# Patient Record
Sex: FEMALE | Race: WHITE | NOT HISPANIC OR LATINO | Employment: OTHER | URBAN - NONMETROPOLITAN AREA
[De-identification: names, ages, dates, MRNs, and addresses within clinical notes are randomized per-mention and may not be internally consistent; named-entity substitution may affect disease eponyms.]

---

## 2017-04-09 ENCOUNTER — HOSPITAL ENCOUNTER (OUTPATIENT)
Facility: HOSPITAL | Age: 67
Setting detail: OBSERVATION
Discharge: HOME | End: 2017-04-10
Attending: EMERGENCY MEDICINE | Admitting: FAMILY MEDICINE

## 2017-04-09 ENCOUNTER — APPOINTMENT (OUTPATIENT)
Dept: GENERAL RADIOLOGY | Facility: HOSPITAL | Age: 67
End: 2017-04-09

## 2017-04-09 DIAGNOSIS — R06.09 DYSPNEA ON EXERTION: Primary | ICD-10-CM

## 2017-04-09 LAB
ALBUMIN SERPL-MCNC: 4.4 G/DL (ref 3.4–4.8)
ALBUMIN/GLOB SERPL: 1.3 G/DL (ref 1.1–1.8)
ALP SERPL-CCNC: 96 U/L (ref 38–126)
ALT SERPL W P-5'-P-CCNC: <6 U/L (ref 9–52)
ANION GAP SERPL CALCULATED.3IONS-SCNC: 15 MMOL/L (ref 5–15)
ARTERIAL PATENCY WRIST A: ABNORMAL
AST SERPL-CCNC: 16 U/L (ref 14–36)
ATMOSPHERIC PRESS: ABNORMAL MMHG
BASE EXCESS BLDA CALC-SCNC: 1.9 MMOL/L (ref -2.4–2.4)
BASOPHILS # BLD AUTO: 0.03 10*3/MM3 (ref 0–0.2)
BASOPHILS NFR BLD AUTO: 0.4 % (ref 0–2)
BDY SITE: ABNORMAL
BILIRUB SERPL-MCNC: 0.7 MG/DL (ref 0.2–1.3)
BUN BLD-MCNC: 15 MG/DL (ref 7–21)
BUN/CREAT SERPL: 15 (ref 7–25)
CA-I BLD-MCNC: 4.7 MG/DL (ref 4.5–4.9)
CALCIUM SPEC-SCNC: 8.5 MG/DL (ref 8.4–10.2)
CHLORIDE SERPL-SCNC: 100 MMOL/L (ref 95–110)
CK MB SERPL-CCNC: 0.24 NG/ML (ref 0–5)
CK MB SERPL-CCNC: 0.37 NG/ML (ref 0–5)
CK SERPL-CCNC: 46 U/L (ref 30–135)
CK SERPL-CCNC: 51 U/L (ref 30–135)
CO2 BLDA-SCNC: 28.8 MMOL/L (ref 23–27)
CO2 SERPL-SCNC: 27 MMOL/L (ref 22–31)
CREAT BLD-MCNC: 1 MG/DL (ref 0.5–1)
D-DIMER, QUANTITATIVE (MAD,POW, STR): <270 NG/ML (FEU) (ref 0–470)
DEPRECATED RDW RBC AUTO: 48.4 FL (ref 36.4–46.3)
EOSINOPHIL # BLD AUTO: 0.27 10*3/MM3 (ref 0–0.7)
EOSINOPHIL NFR BLD AUTO: 3.6 % (ref 0–7)
ERYTHROCYTE [DISTWIDTH] IN BLOOD BY AUTOMATED COUNT: 15.8 % (ref 11.5–14.5)
GFR SERPL CREATININE-BSD FRML MDRD: 55 ML/MIN/1.73 (ref 60–104)
GLOBULIN UR ELPH-MCNC: 3.3 GM/DL (ref 2.3–3.5)
GLUCOSE BLD-MCNC: 134 MG/DL (ref 60–100)
GLUCOSE BLDA-MCNC: 119 MMOL/L
HBA1C MFR BLD: 5.77 % (ref 4–5.6)
HCO3 BLDA-SCNC: 27.3 MMOL/L (ref 22–26)
HCT VFR BLD AUTO: 38.4 % (ref 35–45)
HCT VFR BLD CALC: 36 % (ref 38–47)
HGB BLD-MCNC: 12.4 G/DL (ref 12–15.5)
HGB BLDA-MCNC: 12.1 G/DL (ref 12–16)
HOLD SPECIMEN: NORMAL
HOLD SPECIMEN: NORMAL
IMM GRANULOCYTES # BLD: 0.02 10*3/MM3 (ref 0–0.02)
IMM GRANULOCYTES NFR BLD: 0.3 % (ref 0–0.5)
LYMPHOCYTES # BLD AUTO: 2.14 10*3/MM3 (ref 0.6–4.2)
LYMPHOCYTES NFR BLD AUTO: 28.6 % (ref 10–50)
MCH RBC QN AUTO: 27.1 PG (ref 26.5–34)
MCHC RBC AUTO-ENTMCNC: 32.3 G/DL (ref 31.4–36)
MCV RBC AUTO: 84 FL (ref 80–98)
MODALITY: ABNORMAL
MONOCYTES # BLD AUTO: 0.39 10*3/MM3 (ref 0–0.9)
MONOCYTES NFR BLD AUTO: 5.2 % (ref 0–12)
NEUTROPHILS # BLD AUTO: 4.62 10*3/MM3 (ref 2–8.6)
NEUTROPHILS NFR BLD AUTO: 61.9 % (ref 37–80)
PCO2 BLDA: 46.2 MM HG (ref 35–45)
PH BLDA: 7.39 PH UNITS (ref 7.35–7.45)
PLATELET # BLD AUTO: 374 10*3/MM3 (ref 150–450)
PMV BLD AUTO: 9.9 FL (ref 8–12)
PO2 BLDA: 94.4 MM HG (ref 80–105)
POTASSIUM BLD-SCNC: 4.1 MMOL/L (ref 3.5–5.1)
POTASSIUM BLDA-SCNC: 4.09 MMOL/L (ref 3.6–4.9)
PROT SERPL-MCNC: 7.7 G/DL (ref 6.3–8.6)
RBC # BLD AUTO: 4.57 10*6/MM3 (ref 3.77–5.16)
SAO2 % BLDCOA: 97.2 % (ref 94–100)
SODIUM BLD-SCNC: 142 MMOL/L (ref 137–145)
SODIUM BLDA-SCNC: 138.7 MMOL/L (ref 138–146)
TROPONIN I SERPL-MCNC: <0.012 NG/ML
TSH SERPL DL<=0.05 MIU/L-ACNC: 1.44 MIU/ML (ref 0.46–4.68)
WBC NRBC COR # BLD: 7.47 10*3/MM3 (ref 3.2–9.8)
WHOLE BLOOD HOLD SPECIMEN: NORMAL
WHOLE BLOOD HOLD SPECIMEN: NORMAL

## 2017-04-09 PROCEDURE — 87040 BLOOD CULTURE FOR BACTERIA: CPT | Performed by: FAMILY MEDICINE

## 2017-04-09 PROCEDURE — 71020 HC CHEST PA AND LATERAL: CPT

## 2017-04-09 PROCEDURE — G0378 HOSPITAL OBSERVATION PER HR: HCPCS

## 2017-04-09 PROCEDURE — 80053 COMPREHEN METABOLIC PANEL: CPT | Performed by: EMERGENCY MEDICINE

## 2017-04-09 PROCEDURE — 83036 HEMOGLOBIN GLYCOSYLATED A1C: CPT | Performed by: FAMILY MEDICINE

## 2017-04-09 PROCEDURE — 82803 BLOOD GASES ANY COMBINATION: CPT | Performed by: EMERGENCY MEDICINE

## 2017-04-09 PROCEDURE — 84443 ASSAY THYROID STIM HORMONE: CPT | Performed by: FAMILY MEDICINE

## 2017-04-09 PROCEDURE — 25010000002 AZITHROMYCIN: Performed by: FAMILY MEDICINE

## 2017-04-09 PROCEDURE — 25010000002 CEFTRIAXONE: Performed by: FAMILY MEDICINE

## 2017-04-09 PROCEDURE — 96361 HYDRATE IV INFUSION ADD-ON: CPT

## 2017-04-09 PROCEDURE — 25010000002 METHYLPREDNISOLONE PER 125 MG: Performed by: FAMILY MEDICINE

## 2017-04-09 PROCEDURE — 93005 ELECTROCARDIOGRAM TRACING: CPT | Performed by: EMERGENCY MEDICINE

## 2017-04-09 PROCEDURE — 96374 THER/PROPH/DIAG INJ IV PUSH: CPT

## 2017-04-09 PROCEDURE — 93005 ELECTROCARDIOGRAM TRACING: CPT

## 2017-04-09 PROCEDURE — 99284 EMERGENCY DEPT VISIT MOD MDM: CPT

## 2017-04-09 PROCEDURE — 82553 CREATINE MB FRACTION: CPT | Performed by: EMERGENCY MEDICINE

## 2017-04-09 PROCEDURE — 94799 UNLISTED PULMONARY SVC/PX: CPT

## 2017-04-09 PROCEDURE — 84484 ASSAY OF TROPONIN QUANT: CPT | Performed by: FAMILY MEDICINE

## 2017-04-09 PROCEDURE — 85025 COMPLETE CBC W/AUTO DIFF WBC: CPT | Performed by: EMERGENCY MEDICINE

## 2017-04-09 PROCEDURE — 25010000002 ONDANSETRON PER 1 MG: Performed by: FAMILY MEDICINE

## 2017-04-09 PROCEDURE — 84484 ASSAY OF TROPONIN QUANT: CPT | Performed by: EMERGENCY MEDICINE

## 2017-04-09 PROCEDURE — 94640 AIRWAY INHALATION TREATMENT: CPT

## 2017-04-09 PROCEDURE — 85379 FIBRIN DEGRADATION QUANT: CPT | Performed by: EMERGENCY MEDICINE

## 2017-04-09 PROCEDURE — 82553 CREATINE MB FRACTION: CPT | Performed by: FAMILY MEDICINE

## 2017-04-09 PROCEDURE — 96375 TX/PRO/DX INJ NEW DRUG ADDON: CPT

## 2017-04-09 PROCEDURE — 82550 ASSAY OF CK (CPK): CPT | Performed by: EMERGENCY MEDICINE

## 2017-04-09 PROCEDURE — 93010 ELECTROCARDIOGRAM REPORT: CPT | Performed by: INTERNAL MEDICINE

## 2017-04-09 PROCEDURE — 82550 ASSAY OF CK (CPK): CPT | Performed by: FAMILY MEDICINE

## 2017-04-09 RX ORDER — SODIUM CHLORIDE 9 MG/ML
INJECTION, SOLUTION INTRAVENOUS
Status: COMPLETED
Start: 2017-04-09 | End: 2017-04-09

## 2017-04-09 RX ORDER — METAXALONE 800 MG/1
800 TABLET ORAL 4 TIMES DAILY PRN
Status: DISCONTINUED | OUTPATIENT
Start: 2017-04-09 | End: 2017-04-10 | Stop reason: HOSPADM

## 2017-04-09 RX ORDER — DULOXETIN HYDROCHLORIDE 60 MG/1
CAPSULE, DELAYED RELEASE ORAL
Refills: 5 | COMMUNITY
Start: 2017-03-30 | End: 2017-04-09 | Stop reason: ALTCHOICE

## 2017-04-09 RX ORDER — IPRATROPIUM BROMIDE AND ALBUTEROL SULFATE 2.5; .5 MG/3ML; MG/3ML
3 SOLUTION RESPIRATORY (INHALATION)
Status: DISCONTINUED | OUTPATIENT
Start: 2017-04-09 | End: 2017-04-10 | Stop reason: HOSPADM

## 2017-04-09 RX ORDER — PANTOPRAZOLE SODIUM 40 MG/1
40 TABLET, DELAYED RELEASE ORAL 2 TIMES DAILY
Status: DISCONTINUED | OUTPATIENT
Start: 2017-04-09 | End: 2017-04-10 | Stop reason: HOSPADM

## 2017-04-09 RX ORDER — IPRATROPIUM BROMIDE AND ALBUTEROL SULFATE 2.5; .5 MG/3ML; MG/3ML
3 SOLUTION RESPIRATORY (INHALATION) ONCE
Status: COMPLETED | OUTPATIENT
Start: 2017-04-09 | End: 2017-04-09

## 2017-04-09 RX ORDER — AMLODIPINE BESYLATE 10 MG/1
TABLET ORAL
Refills: 2 | COMMUNITY
Start: 2017-04-03 | End: 2018-04-07

## 2017-04-09 RX ORDER — GABAPENTIN 300 MG/1
300 CAPSULE ORAL EVERY 8 HOURS SCHEDULED
Status: DISCONTINUED | OUTPATIENT
Start: 2017-04-09 | End: 2017-04-10 | Stop reason: HOSPADM

## 2017-04-09 RX ORDER — GABAPENTIN 300 MG/1
CAPSULE ORAL
Refills: 1 | COMMUNITY
Start: 2017-03-24 | End: 2020-08-18 | Stop reason: HOSPADM

## 2017-04-09 RX ORDER — MAGNESIUM HYDROXIDE/ALUMINUM HYDROXICE/SIMETHICONE 120; 1200; 1200 MG/30ML; MG/30ML; MG/30ML
30 SUSPENSION ORAL ONCE
Status: COMPLETED | OUTPATIENT
Start: 2017-04-09 | End: 2017-04-09

## 2017-04-09 RX ORDER — ZOLPIDEM TARTRATE 10 MG/1
10 TABLET ORAL NIGHTLY PRN
Refills: 2 | COMMUNITY
Start: 2017-03-20

## 2017-04-09 RX ORDER — METHYLPREDNISOLONE SODIUM SUCCINATE 40 MG/ML
40 INJECTION, POWDER, LYOPHILIZED, FOR SOLUTION INTRAMUSCULAR; INTRAVENOUS EVERY 8 HOURS
Status: DISCONTINUED | OUTPATIENT
Start: 2017-04-10 | End: 2017-04-10

## 2017-04-09 RX ORDER — SUCRALFATE 1 G/1
1 TABLET ORAL 3 TIMES DAILY PRN
Status: DISCONTINUED | OUTPATIENT
Start: 2017-04-09 | End: 2017-04-10 | Stop reason: HOSPADM

## 2017-04-09 RX ORDER — ASPIRIN 325 MG
325 TABLET ORAL ONCE
Status: COMPLETED | OUTPATIENT
Start: 2017-04-09 | End: 2017-04-09

## 2017-04-09 RX ORDER — FUROSEMIDE 20 MG/1
20 TABLET ORAL 2 TIMES DAILY PRN
Status: DISCONTINUED | OUTPATIENT
Start: 2017-04-09 | End: 2017-04-10

## 2017-04-09 RX ORDER — AMLODIPINE BESYLATE 10 MG/1
10 TABLET ORAL
Status: DISCONTINUED | OUTPATIENT
Start: 2017-04-10 | End: 2017-04-10 | Stop reason: HOSPADM

## 2017-04-09 RX ORDER — HYDROCODONE BITARTRATE AND ACETAMINOPHEN 7.5; 325 MG/1; MG/1
1 TABLET ORAL EVERY 6 HOURS PRN
Status: DISCONTINUED | OUTPATIENT
Start: 2017-04-09 | End: 2017-04-10 | Stop reason: HOSPADM

## 2017-04-09 RX ORDER — ONDANSETRON 2 MG/ML
4 INJECTION INTRAMUSCULAR; INTRAVENOUS EVERY 6 HOURS PRN
Status: DISCONTINUED | OUTPATIENT
Start: 2017-04-09 | End: 2017-04-10 | Stop reason: HOSPADM

## 2017-04-09 RX ORDER — METHYLPREDNISOLONE SODIUM SUCCINATE 125 MG/2ML
125 INJECTION, POWDER, LYOPHILIZED, FOR SOLUTION INTRAMUSCULAR; INTRAVENOUS ONCE
Status: COMPLETED | OUTPATIENT
Start: 2017-04-09 | End: 2017-04-09

## 2017-04-09 RX ORDER — ZOLPIDEM TARTRATE 5 MG/1
10 TABLET ORAL NIGHTLY PRN
Status: DISCONTINUED | OUTPATIENT
Start: 2017-04-09 | End: 2017-04-10 | Stop reason: HOSPADM

## 2017-04-09 RX ORDER — SODIUM CHLORIDE 0.9 % (FLUSH) 0.9 %
1-10 SYRINGE (ML) INJECTION AS NEEDED
Status: DISCONTINUED | OUTPATIENT
Start: 2017-04-09 | End: 2017-04-10 | Stop reason: HOSPADM

## 2017-04-09 RX ORDER — ALBUTEROL SULFATE 2.5 MG/3ML
2.5 SOLUTION RESPIRATORY (INHALATION) EVERY 4 HOURS PRN
Status: DISCONTINUED | OUTPATIENT
Start: 2017-04-09 | End: 2017-04-10 | Stop reason: HOSPADM

## 2017-04-09 RX ADMIN — IPRATROPIUM BROMIDE AND ALBUTEROL SULFATE 3 ML: 2.5; .5 SOLUTION RESPIRATORY (INHALATION) at 20:29

## 2017-04-09 RX ADMIN — HYDROCODONE BITARTRATE AND ACETAMINOPHEN 1 TABLET: 7.5; 325 TABLET ORAL at 20:46

## 2017-04-09 RX ADMIN — METHYLPREDNISOLONE SODIUM SUCCINATE 125 MG: 125 INJECTION, POWDER, FOR SOLUTION INTRAMUSCULAR; INTRAVENOUS at 21:46

## 2017-04-09 RX ADMIN — GABAPENTIN 300 MG: 300 CAPSULE ORAL at 21:43

## 2017-04-09 RX ADMIN — Medication 10 ML: at 20:49

## 2017-04-09 RX ADMIN — LIDOCAINE HYDROCHLORIDE 15 ML: 20 SOLUTION ORAL; TOPICAL at 15:08

## 2017-04-09 RX ADMIN — ALUMINUM HYDROXIDE, MAGNESIUM HYDROXIDE, AND SIMETHICONE 30 ML: 200; 200; 20 SUSPENSION ORAL at 15:09

## 2017-04-09 RX ADMIN — ONDANSETRON 4 MG: 2 INJECTION INTRAMUSCULAR; INTRAVENOUS at 21:49

## 2017-04-09 RX ADMIN — SODIUM CHLORIDE 500 ML: 9 INJECTION, SOLUTION INTRAVENOUS at 20:50

## 2017-04-09 RX ADMIN — IPRATROPIUM BROMIDE AND ALBUTEROL SULFATE 3 ML: .5; 3 SOLUTION RESPIRATORY (INHALATION) at 15:09

## 2017-04-09 RX ADMIN — ASPIRIN 325 MG: 325 TABLET, COATED ORAL at 15:08

## 2017-04-09 RX ADMIN — ZOLPIDEM TARTRATE 10 MG: 5 TABLET ORAL at 23:20

## 2017-04-09 RX ADMIN — CARBIDOPA AND LEVODOPA 1 TABLET: 25; 100 TABLET ORAL at 20:46

## 2017-04-09 RX ADMIN — PANTOPRAZOLE SODIUM 40 MG: 40 TABLET, DELAYED RELEASE ORAL at 20:46

## 2017-04-09 NOTE — H&P
"      [facility] Medicine Admission      Date of Admission: 4/9/2017      Primary Care Physician: Sandhya Thomas DO      Chief Complaint: Shortness of air    HPI:  This 66 year old female reported to the emergency department at the direction of the urgent care center she visited today.  Patient reports that for approximately the last week she has had mild shortness of air and wheezing.  Patient states that she was worried about pneumonia, so she reported to an urgent care center.  The urgent care center provider was concerned that she could be having an atypical sign of MI.  Patient then was directed to the emergency department.  Patient states that she doesn't really have any chest pain, though sometimes she may have a \"twang here or there.  \"Denies chest pain upon exertion.  Denies orthopnea or paroxysmal nocturnal dyspnea.  Patient is not currently having any fever or chills, but 3-4 days ago she said that she had a single temperature of 101.  Patient states that she does have some generalized weakness, but denies nausea and vomiting.  Denies headache, dizziness, syncope, presyncope, hematuria, hematochezia, hematemesis.    Concurrent Medical History:  has a past medical history of Acute gastric ulcer with hemorrhage; Acute sinusitis; Adenomatous polyp of colon; Chronic gastric ulcer without hemorrhage and without perforation; Constipation; Constipation; Depressive disorder; Depressive disorder; Fever; Generalized abdominal pain; GERD (gastroesophageal reflux disease); Gout; Hypertension; Hypertensive disorder; Influenza; Insomnia; Intertrigo; Nausea; Osteoarthritis of multiple joints; and Upper respiratory infection.    Past Surgical History:  has a past surgical history that includes Cholecystectomy; Esophagoscopy / EGD (06/02/2015); Knee surgery (10/02/2014); and Hysterectomy (01/01/1976).    Family History: family history includes Lung cancer in her other.    Social " History:  reports that she has quit smoking. She does not have any smokeless tobacco history on file. She reports that she does not drink alcohol.    Allergies:   Allergies   Allergen Reactions   • Benzoyl Peroxide    • Iodine       iodine:  UNRESPONSIVENESS   • Other      ARTIFICIAL SWEETNER:  Rash: TOPICAL ANTIBACERTIAL AGENTS:  Rash   • Latex Rash   • Tape Rash       Medications: Scheduled Meds:  Continuous Infusions:  No current facility-administered medications for this encounter.   PRN Meds:.    Review of Systems:  Review of Systems   Constitutional: Negative for chills and fever.   Respiratory: Positive for chest tightness, shortness of breath and wheezing.    Cardiovascular: Positive for chest pain. Negative for palpitations.        Mild, vague, multiple locations, reproducible chest pain   Gastrointestinal: Negative for abdominal pain, blood in stool, constipation, diarrhea, nausea and vomiting.   Genitourinary: Negative for decreased urine volume, dysuria, hematuria and urgency.   Musculoskeletal: Negative for myalgias.   Neurological: Positive for weakness. Negative for dizziness, seizures, syncope, facial asymmetry, speech difficulty, light-headedness, numbness and headaches.        Generalized weakness   Psychiatric/Behavioral: Negative for confusion.      Otherwise complete ROS is negative except as mentioned above.    Physical Exam:   Temp:  [98.3 °F (36.8 °C)-98.8 °F (37.1 °C)] 98.3 °F (36.8 °C)  Heart Rate:  [] 97  Resp:  [18-20] 18  BP: (121-178)/(60-96) 178/80  Physical Exam   Constitutional: She is oriented to person, place, and time. She appears well-developed and well-nourished.   HENT:   Head: Normocephalic and atraumatic.   Eyes: Conjunctivae and EOM are normal. Pupils are equal, round, and reactive to light.   Cardiovascular: Normal rate, regular rhythm, normal heart sounds and intact distal pulses.  Exam reveals no gallop and no friction rub.    No murmur heard.  Pulmonary/Chest:  "Effort normal. No respiratory distress. She has no wheezes. She has no rales.   Decreased at bases   Abdominal: Soft. Bowel sounds are normal. She exhibits no distension. There is no tenderness.   Musculoskeletal: She exhibits no edema or tenderness.   Neurological: She is alert and oriented to person, place, and time.   Skin: Skin is warm and dry.   Psychiatric: She has a normal mood and affect. Her behavior is normal.     Patient is speaking in long sentences, no distress, no accessory muscle use, no hypoxia, not on oxygen.  Patient stated, \"Honey, if I can't talk up a storm, just put me in my grave.\"      Results Reviewed:  I have personally reviewed current lab, radiology, and data and agree with results.  Lab Results (last 24 hours)     Procedure Component Value Units Date/Time    CBC & Differential [[id number]] Collected:  04/09/17 1314    Specimen:  Blood Updated:  04/09/17 1338    Narrative:       The following orders were created for panel order CBC & Differential.  Procedure                               Abnormality         Status                     ---------                               -----------         ------                     CBC Auto Differential[[id number]]         Abnormal            Final result                 Please view results for these tests on the individual orders.    CBC Auto Differential [[id number]]  (Abnormal) Collected:  04/09/17 1314    Specimen:  Blood Updated:  04/09/17 1338     WBC 7.47 10*3/mm3      RBC 4.57 10*6/mm3      Hemoglobin 12.4 g/dL      Hematocrit 38.4 %      MCV 84.0 fL      MCH 27.1 pg      MCHC 32.3 g/dL      RDW 15.8 (H) %      RDW-SD 48.4 (H) fl      MPV 9.9 fL      Platelets 374 10*3/mm3      Neutrophil % 61.9 %      Lymphocyte % 28.6 %      Monocyte % 5.2 %      Eosinophil % 3.6 %      Basophil % 0.4 %      Immature Grans % 0.3 %      Neutrophils, Absolute 4.62 10*3/mm3      Lymphocytes, Absolute 2.14 10*3/mm3      Monocytes, Absolute 0.39 10*3/mm3      " Eosinophils, Absolute 0.27 10*3/mm3      Basophils, Absolute 0.03 10*3/mm3      Immature Grans, Absolute 0.02 10*3/mm3     CK [[id number]]  (Normal) Collected:  04/09/17 1314    Specimen:  Blood Updated:  04/09/17 1346     Creatine Kinase 46 U/L     Comprehensive Metabolic Panel [[id number]]  (Abnormal) Collected:  04/09/17 1314    Specimen:  Blood Updated:  04/09/17 1351     Glucose 134 (H) mg/dL      BUN 15 mg/dL      Creatinine 1.00 mg/dL      Sodium 142 mmol/L      Potassium 4.1 mmol/L      Chloride 100 mmol/L      CO2 27.0 mmol/L      Calcium 8.5 mg/dL      Total Protein 7.7 g/dL      Albumin 4.40 g/dL      ALT (SGPT) <6 (L) U/L      AST (SGOT) 16 U/L      Alkaline Phosphatase 96 U/L      Total Bilirubin 0.7 mg/dL      eGFR Non African Amer 55 (L) mL/min/1.73      Globulin 3.3 gm/dL      A/G Ratio 1.3 g/dL      BUN/Creatinine Ratio 15.0     Anion Gap 15.0 mmol/L     CK-MB [[id number]]  (Normal) Collected:  04/09/17 1314    Specimen:  Blood Updated:  04/09/17 1355     CKMB 0.37 ng/mL     Blood Gas, Arterial [[id number]]  (Abnormal) Collected:  04/09/17 1501    Specimen:  Arterial Blood Updated:  04/09/17 1506     Site --      Not performed at this site.        Mario's Test --      Not performed at this site.        pH, Arterial 7.390 pH units      pCO2, Arterial 46.2 (H) mm Hg      pO2, Arterial 94.4 mm Hg      HCO3, Arterial 27.3 (H) mmol/L      Base Excess, Arterial 1.9 mmol/L      O2 Saturation, Arterial 97.2 %      Hemoglobin, Blood Gas 12.1 g/dL      Hematocrit, Blood Gas 36.0 %      CO2 Content 28.8 (H)     Sodium, Arterial 138.7 mmol/L      Potassium, Arterial 4.09 mmol/L      Glucose, Arterial 119 mmol/L      Barometric Pressure for Blood Gas -- mmHg       Not performed at this site.        Modality --      Not performed at this site.        Ionized Calcium 4.7 mg/dL     D-dimer, Quantitative [[id number]]  (Normal) Collected:  04/09/17 1551    Specimen:  Blood from Arm, Right Updated:  04/09/17 1617     D-Dimer,  Quantitative <270 ng/mL (FEU)     Narrative:       Dimer values <500 ng/ml FEU are FDA approved as aid in diagnosis of deep venous thrombosis and pulmonary embolism.  This test should not be used in an exclusion strategy with pretest probability alone.    A recent guideline regarding diagnosis for pulmonary thomboembolism recommends an adjusted exclusion criterion of age x 10 ng/ml FEU for patients >50 years of age ([employer] Intern Med 2015; 163: 701-711).    Troponin [[id number]]  (Normal) Collected:  04/09/17 1551    Specimen:  Blood from Arm, Right Updated:  04/09/17 1619     Troponin I <0.012 ng/mL     Green Top (Gel) [[id number]] Collected:  04/09/17 1314    Specimen:  Blood Updated:  04/09/17 1801     Extra Tube Hold for add-ons.      Auto resulted.       Lavender Top [[id number]] Collected:  04/09/17 1314    Specimen:  Blood Updated:  04/09/17 1801     Extra Tube hold for add-on      Auto resulted       [place] Draw [[id number]] Collected:  04/09/17 1314    Specimen:  Blood Updated:  04/09/17 1801    Narrative:       The following orders were created for panel order [place] Draw.  Procedure                               Abnormality         Status                     ---------                               -----------         ------                     Light Blue Top[[id number]]                                    Final result               Green Top (Gel)[[id number]]                                   Final result               Lavender Top[[id number]]                                      Final result               Gold Top - SST[[id number]]                                    Final result                 Please view results for these tests on the individual orders.    Light Blue Top [[id number]] Collected:  04/09/17 1314    Specimen:  Blood Updated:  04/09/17 1801     Extra Tube hold for add-on      Auto resulted       Gold Top - SST [[id number]] Collected:  04/09/17 1314    Specimen:  Blood Updated:  04/09/17 1801     Extra Tube Hold  for add-ons.      Auto resulted.           Imaging Results (last 24 hours)     Procedure Component Value Units Date/Time    XR Chest 2 View [[id number]] Collected:  04/09/17 1439     Updated:  04/09/17 1441    Narrative:       Patient Name:  YE ZAMUDIO  Patient ID:  [id number]   Ordering:  ZAY CRAWFORD  Attending:  ZAY CRAWFORD  Referring:  ZAY CRAWFORD  ------------------------------------------------    TWO VIEW CHEST    HISTORY: Shortness of breath    Frontal and lateral films of the chest were obtained.  COMPARISON: October 11, 2014    EKG leads in place.  Linear atelectasis or scar inferior right upper lobe.  The heart is not enlarged.  The pulmonary vasculature is not increased.  No pleural effusion.  No pneumothorax.  No acute osseous abnormality.  Degenerative changes are present in the thoracic spine.      Impression:       CONCLUSION:  Linear atelectasis or scar inferior right upper lobe.    [id number]    Electronically signed by:  Manjit Rich MD  4/9/2017 2:40 PM CDT  Workstation: [email or web address]            Assessment:    Hospital Problem List     Dyspnea on exertion      Possible bronchitis, bacterial v. Viral  Morbid obesity        Plan:  Steroids, nebulizer treatments, empiric antibiotics, cardiac enzymes, echocardiogram, incentive spirometry, monitor EKG.  At this time, acute cardiac event is low on differential list.  At this time, we will forgo cardiology consult.  If patient develops persistent chest pain, if enzymes become positive, or if echocardiogram yields acute results, will consult with cardiology.  At this time patient has no hypoxia, no distress, and leukocytosis, EKG and cardiac enzymes WNL.      AILYN Head  04/09/17  6:15 PM

## 2017-04-09 NOTE — ED PROVIDER NOTES
Subjective   HPI Comments: 66 years old female with history of hypertension, GERD, restless leg syndrome presented to ER from urgent care with worsening shortness of breath for the last 2 weeks.  Shortness of breath gets worse with activity.  She can hardly walk 20 steps before she has to rest to catch her breath.  She is also feeling tightness/pressure in the chest with shortness of breath.  She denies any palpitations.  She denies any fever or chills.    Patient is a 66 y.o. female presenting with shortness of breath.   History provided by:  Patient  Shortness of Breath   Severity:  Moderate  Onset quality:  Gradual  Duration:  2 weeks  Timing:  Intermittent  Progression:  Worsening  Chronicity:  New  Context: activity    Relieved by:  Rest  Worsened by:  Activity and coughing  Associated symptoms: chest pain, cough and wheezing    Associated symptoms: no abdominal pain, no fever, no headaches, no neck pain, no rash, no sputum production and no vomiting    Chest pain:     Quality: pressure      Severity:  Mild    Onset quality:  Gradual    Timing:  Intermittent    Progression:  Waxing and waning    Chronicity:  New  Cough:     Cough characteristics:  Dry    Severity:  Moderate    Onset quality:  Gradual    Timing:  Intermittent    Progression:  Worsening  Wheezing:     Severity:  Moderate    Onset quality:  Gradual    Timing:  Constant    Progression:  Worsening    Chronicity:  New  Risk factors: obesity    Risk factors: no hx of PE/DVT        Review of Systems   Constitutional: Negative for activity change, chills and fever.   HENT: Negative for congestion, facial swelling, rhinorrhea and sinus pressure.    Eyes: Negative for photophobia and visual disturbance.   Respiratory: Positive for cough, shortness of breath and wheezing. Negative for sputum production and chest tightness.    Cardiovascular: Positive for chest pain.   Gastrointestinal: Negative for abdominal distention, abdominal pain, diarrhea, nausea and  vomiting.   Endocrine: Negative for polyuria.   Genitourinary: Negative for flank pain.   Musculoskeletal: Negative for back pain, joint swelling and neck pain.   Skin: Negative for rash.   Neurological: Negative for seizures, numbness and headaches.   Hematological: Negative for adenopathy.   Psychiatric/Behavioral: Negative for agitation.       Past Medical History:   Diagnosis Date   • Acute gastric ulcer with hemorrhage    • Acute sinusitis    • Adenomatous polyp of colon    • Chronic gastric ulcer without hemorrhage and without perforation    • Constipation    • Constipation    • Depressive disorder    • Depressive disorder    • Fever    • Generalized abdominal pain    • GERD (gastroesophageal reflux disease)    • Gout    • Hypertension    • Hypertensive disorder    • Influenza    • Insomnia    • Intertrigo    • Nausea    • Osteoarthritis of multiple joints    • Upper respiratory infection        Allergies   Allergen Reactions   • Benzoyl Peroxide    • Iodine       iodine:  UNRESPONSIVENESS   • Other      ARTIFICIAL SWEETNER:  Rash: TOPICAL ANTIBACERTIAL AGENTS:  Rash   • Latex Rash   • Tape Rash       Past Surgical History:   Procedure Laterality Date   • CHOLECYSTECTOMY     • ESOPHAGOSCOPY / EGD  06/02/2015    Normal esophagus. Normal duodenum. Ulcer in the antrum. Performed at [facility].   • HYSTERECTOMY  01/01/1976   • KNEE SURGERY  10/02/2014    Bilateral total knee replacement, LCS, standard femoral components, 2.5 tibial, 10 mm insert with patella with cement. Two Hemovac drains each knee.       Family History   Problem Relation Age of Onset   • Lung cancer Other        Social History     Social History   • Marital status:      Spouse name: N/A   • Number of children: N/A   • Years of education: N/A     Social History Main Topics   • Smoking status: Former Smoker   • Smokeless tobacco: None      Comment: 30 pack year history   • Alcohol use No   • Drug use: None   • Sexual  activity: Not Asked     Other Topics Concern   • None     Social History Narrative           Objective   Physical Exam   Constitutional: She is oriented to person, place, and time. She appears well-developed and well-nourished. No distress.   HENT:   Head: Normocephalic and atraumatic.   Eyes: Conjunctivae and EOM are normal. Pupils are equal, round, and reactive to light.   Neck: Normal range of motion. Neck supple.   Cardiovascular: Normal rate, regular rhythm and normal heart sounds.    Pulmonary/Chest: Effort normal. No respiratory distress. She has wheezes.   Abdominal: Soft. Bowel sounds are normal. She exhibits no distension. There is no tenderness.   Musculoskeletal: Normal range of motion. She exhibits no edema or deformity.   Neurological: She is alert and oriented to person, place, and time.   Skin: Skin is warm and dry. No rash noted.   Psychiatric: She has a normal mood and affect.   Nursing note and vitals reviewed.      ECG 12 Lead    Date/Time: 4/9/2017 12:35 PM  Performed by: ZAY CRAWFORD  Authorized by: ZAY CRAWFORD   Interpreted by physician  Rhythm: sinus rhythm  Rate: normal  QRS axis: normal  Conduction: incomplete RBBB  Clinical impression: abnormal ECG                 ED Course  ED Course   Comment By Time   She is given breathing treatments and aspirin.  EKG did not show any acute ischemic changes.  Has negative initial set of cardiac enzymes.  D-dimer are negative.  She walked to bath room and again got short of breath with some chest tightness. She would be admitted to hospitalist service for dyspnea on exertion as I believe it is angina equivalent. Zay Crawford MD 04/09 1730        Labs Reviewed   COMPREHENSIVE METABOLIC PANEL - Abnormal; Notable for the following:        Result Value    Glucose 134 (*)     ALT (SGPT) <6 (*)     eGFR Non  Amer 55 (*)     All other components within normal limits   CBC WITH AUTO DIFFERENTIAL - Abnormal; Notable for the following:     RDW 15.8 (*)      RDW-SD 48.4 (*)     All other components within normal limits   BLOOD GAS, ARTERIAL - Abnormal; Notable for the following:     pCO2, Arterial 46.2 (*)     HCO3, Arterial 27.3 (*)     CO2 Content 28.8 (*)     All other components within normal limits   CK - Normal   CK MB - Normal   TROPONIN (IN-HOUSE) - Normal   D-DIMER, QUANTITATIVE - Normal    Narrative:     Dimer values <500 ng/ml FEU are FDA approved as aid in diagnosis of deep venous thrombosis and pulmonary embolism.  This test should not be used in an exclusion strategy with pretest probability alone.    A recent guideline regarding diagnosis for pulmonary thomboembolism recommends an adjusted exclusion criterion of age x 10 ng/ml FEU for patients >50 years of age ([employer] [facility] 2015; 163: 701-711).   RAINBOW DRAW    Narrative:     The following orders were created for panel order [place] Draw.  Procedure                               Abnormality         Status                     ---------                               -----------         ------                     Light Blue Top[[id number]]                                    In process                 Green Top (Gel)[[id number]]                                   In process                 Lavender Top[[id number]]                                      In process                 Gold Top - SST[[id number]]                                    In process                   Please view results for these tests on the individual orders.   TROPONIN (IN-HOUSE)   TROPONIN (IN-HOUSE)   CBC AND DIFFERENTIAL    Narrative:     The following orders were created for panel order CBC & Differential.  Procedure                               Abnormality         Status                     ---------                               -----------         ------                     CBC Auto Differential[[id number]]         Abnormal            Final result                 Please view results for these tests on the individual orders.   LIGHT BLUE  TOP   GREEN TOP   LAVENDER TOP   GOLD TOP - SST       Xr Chest 2 View    Result Date: 4/9/2017  Narrative: Patient Name:  YE ZAMUDIO Patient ID:  [id number] Ordering:  ZAY CRAWFORD Attending:  ZAY CRAWFORD Referring:  ZAY CRAWFORD ------------------------------------------------ TWO VIEW CHEST HISTORY: Shortness of breath Frontal and lateral films of the chest were obtained. COMPARISON: October 11, 2014 EKG leads in place. Linear atelectasis or scar inferior right upper lobe. The heart is not enlarged. The pulmonary vasculature is not increased. No pleural effusion. No pneumothorax. No acute osseous abnormality. Degenerative changes are present in the thoracic spine.     Impression: CONCLUSION: Linear atelectasis or scar inferior right upper lobe. [id number] Electronically signed by:  Manjit Rich MD  4/9/2017 2:40 PM CDT Workstation: [email or web address]                [facility]    Final diagnoses:   Dyspnea on exertion            Zay Crawford MD  04/11/17 2009

## 2017-04-09 NOTE — ED NOTES
Pt reports to ED c/o soa from urgent care, has had soa for 3-4 days. Navinies cp, n/v/d      Dom Underwood, PATI  04/09/17 1287

## 2017-04-10 ENCOUNTER — APPOINTMENT (OUTPATIENT)
Dept: CARDIOLOGY | Facility: HOSPITAL | Age: 67
End: 2017-04-10
Attending: FAMILY MEDICINE

## 2017-04-10 VITALS
TEMPERATURE: 97.7 F | HEART RATE: 104 BPM | OXYGEN SATURATION: 92 % | WEIGHT: 262 LBS | DIASTOLIC BLOOD PRESSURE: 65 MMHG | RESPIRATION RATE: 18 BRPM | SYSTOLIC BLOOD PRESSURE: 146 MMHG | BODY MASS INDEX: 46.42 KG/M2 | HEIGHT: 63 IN

## 2017-04-10 PROBLEM — I51.89 DIASTOLIC DYSFUNCTION: Chronic | Status: ACTIVE | Noted: 2017-04-10

## 2017-04-10 PROBLEM — I51.7 LVH (LEFT VENTRICULAR HYPERTROPHY): Chronic | Status: ACTIVE | Noted: 2017-04-10

## 2017-04-10 LAB
ANION GAP SERPL CALCULATED.3IONS-SCNC: 13 MMOL/L (ref 5–15)
ARTICHOKE IGE QN: 133 MG/DL (ref 1–129)
BASOPHILS # BLD AUTO: 0.01 10*3/MM3 (ref 0–0.2)
BASOPHILS NFR BLD AUTO: 0.1 % (ref 0–2)
BH CV ECHO MEAS - % IVS THICK: 37.9 %
BH CV ECHO MEAS - ACS: 2.1 CM
BH CV ECHO MEAS - AO MAX PG (FULL): 4.1 MMHG
BH CV ECHO MEAS - AO MAX PG: 15.7 MMHG
BH CV ECHO MEAS - AO MEAN PG (FULL): 0.99 MMHG
BH CV ECHO MEAS - AO MEAN PG: 7 MMHG
BH CV ECHO MEAS - AO ROOT AREA: 8.4 CM^2
BH CV ECHO MEAS - AO ROOT DIAM: 3.3 CM
BH CV ECHO MEAS - AO V2 MAX: 198.1 CM/SEC
BH CV ECHO MEAS - AO V2 MEAN: 120.6 CM/SEC
BH CV ECHO MEAS - AO V2 VTI: 30.1 CM
BH CV ECHO MEAS - AVA(I,A): 3.2 CM^2
BH CV ECHO MEAS - AVA(I,D): 3.2 CM^2
BH CV ECHO MEAS - AVA(V,A): 3.1 CM^2
BH CV ECHO MEAS - AVA(V,D): 3.1 CM^2
BH CV ECHO MEAS - EDV(CUBED): 86.6 ML
BH CV ECHO MEAS - EDV(TEICH): 88.9 ML
BH CV ECHO MEAS - EF(CUBED): 74.3 %
BH CV ECHO MEAS - EF(TEICH): 66.4 %
BH CV ECHO MEAS - ESV(CUBED): 22.2 ML
BH CV ECHO MEAS - ESV(TEICH): 29.9 ML
BH CV ECHO MEAS - FS: 36.5 %
BH CV ECHO MEAS - IVS/LVPW: 1.2
BH CV ECHO MEAS - IVSD: 1.3 CM
BH CV ECHO MEAS - IVSS: 1.8 CM
BH CV ECHO MEAS - LA DIMENSION: 4.3 CM
BH CV ECHO MEAS - LA/AO: 1.3
BH CV ECHO MEAS - LV MASS(C)D: 203.5 GRAMS
BH CV ECHO MEAS - LV MAX PG: 11.6 MMHG
BH CV ECHO MEAS - LV MEAN PG: 6 MMHG
BH CV ECHO MEAS - LV V1 MAX: 169.6 CM/SEC
BH CV ECHO MEAS - LV V1 MEAN: 113.7 CM/SEC
BH CV ECHO MEAS - LV V1 VTI: 27.2 CM
BH CV ECHO MEAS - LVIDD: 4.4 CM
BH CV ECHO MEAS - LVIDS: 2.8 CM
BH CV ECHO MEAS - LVOT AREA (M): 3.8 CM^2
BH CV ECHO MEAS - LVOT AREA: 3.6 CM^2
BH CV ECHO MEAS - LVOT DIAM: 2.1 CM
BH CV ECHO MEAS - LVPWD: 1.2 CM
BH CV ECHO MEAS - MR MAX PG: 139.2 MMHG
BH CV ECHO MEAS - MR MAX VEL: 590 CM/SEC
BH CV ECHO MEAS - MV MAX PG: 12.6 MMHG
BH CV ECHO MEAS - MV MEAN PG: 5 MMHG
BH CV ECHO MEAS - MV V2 MAX: 176.3 CM/SEC
BH CV ECHO MEAS - MV V2 MEAN: 97.3 CM/SEC
BH CV ECHO MEAS - MV V2 VTI: 27.9 CM
BH CV ECHO MEAS - MVA(VTI): 3.5 CM^2
BH CV ECHO MEAS - PA MAX PG: 8.3 MMHG
BH CV ECHO MEAS - PA V2 MAX: 143.9 CM/SEC
BH CV ECHO MEAS - RAP SYSTOLE: 10 MMHG
BH CV ECHO MEAS - RVDD: 3.2 CM
BH CV ECHO MEAS - RVSP: 66 MMHG
BH CV ECHO MEAS - SV(AO): 253.5 ML
BH CV ECHO MEAS - SV(CUBED): 64.4 ML
BH CV ECHO MEAS - SV(LVOT): 97.8 ML
BH CV ECHO MEAS - SV(TEICH): 59 ML
BH CV ECHO MEAS - TAPSE (>1.6): 2.6 CM2
BH CV ECHO MEAS - TR MAX VEL: 289.7 CM/SEC
BILIRUB UR QL STRIP: NEGATIVE
BUN BLD-MCNC: 17 MG/DL (ref 7–21)
BUN/CREAT SERPL: 14.7 (ref 7–25)
CALCIUM SPEC-SCNC: 8.9 MG/DL (ref 8.4–10.2)
CHLORIDE SERPL-SCNC: 104 MMOL/L (ref 95–110)
CHOLEST SERPL-MCNC: 221 MG/DL (ref 0–199)
CLARITY UR: CLEAR
CO2 SERPL-SCNC: 23 MMOL/L (ref 22–31)
COLOR UR: YELLOW
CREAT BLD-MCNC: 1.16 MG/DL (ref 0.5–1)
DEPRECATED RDW RBC AUTO: 48.3 FL (ref 36.4–46.3)
EOSINOPHIL # BLD AUTO: 0 10*3/MM3 (ref 0–0.7)
EOSINOPHIL NFR BLD AUTO: 0 % (ref 0–7)
ERYTHROCYTE [DISTWIDTH] IN BLOOD BY AUTOMATED COUNT: 15.7 % (ref 11.5–14.5)
GFR SERPL CREATININE-BSD FRML MDRD: 47 ML/MIN/1.73 (ref 45–104)
GLUCOSE BLD-MCNC: 252 MG/DL (ref 60–100)
GLUCOSE UR STRIP-MCNC: NEGATIVE MG/DL
HCT VFR BLD AUTO: 35 % (ref 35–45)
HDLC SERPL-MCNC: 54 MG/DL (ref 60–200)
HGB BLD-MCNC: 11.3 G/DL (ref 12–15.5)
HGB UR QL STRIP.AUTO: NEGATIVE
IMM GRANULOCYTES # BLD: 0.02 10*3/MM3 (ref 0–0.02)
IMM GRANULOCYTES NFR BLD: 0.2 % (ref 0–0.5)
KETONES UR QL STRIP: NEGATIVE
LDLC/HDLC SERPL: 2.68 {RATIO} (ref 0–3.22)
LEUKOCYTE ESTERASE UR QL STRIP.AUTO: NEGATIVE
LYMPHOCYTES # BLD AUTO: 1.01 10*3/MM3 (ref 0.6–4.2)
LYMPHOCYTES NFR BLD AUTO: 11.6 % (ref 10–50)
MCH RBC QN AUTO: 27.2 PG (ref 26.5–34)
MCHC RBC AUTO-ENTMCNC: 32.3 G/DL (ref 31.4–36)
MCV RBC AUTO: 84.1 FL (ref 80–98)
MONOCYTES # BLD AUTO: 0.04 10*3/MM3 (ref 0–0.9)
MONOCYTES NFR BLD AUTO: 0.5 % (ref 0–12)
NEUTROPHILS # BLD AUTO: 7.59 10*3/MM3 (ref 2–8.6)
NEUTROPHILS NFR BLD AUTO: 87.6 % (ref 37–80)
NITRITE UR QL STRIP: NEGATIVE
PH UR STRIP.AUTO: <=5 [PH] (ref 5–9)
PLATELET # BLD AUTO: 392 10*3/MM3 (ref 150–450)
PMV BLD AUTO: 10.3 FL (ref 8–12)
POTASSIUM BLD-SCNC: 4.8 MMOL/L (ref 3.5–5.1)
PROT UR QL STRIP: NEGATIVE
RBC # BLD AUTO: 4.16 10*6/MM3 (ref 3.77–5.16)
SODIUM BLD-SCNC: 140 MMOL/L (ref 137–145)
SP GR UR STRIP: 1.02 (ref 1–1.03)
TRIGL SERPL-MCNC: 112 MG/DL (ref 20–199)
TROPONIN I SERPL-MCNC: <0.012 NG/ML
TROPONIN I SERPL-MCNC: <0.012 NG/ML
UROBILINOGEN UR QL STRIP: NORMAL
WBC NRBC COR # BLD: 8.67 10*3/MM3 (ref 3.2–9.8)
WHOLE BLOOD HOLD SPECIMEN: NORMAL

## 2017-04-10 PROCEDURE — 93306 TTE W/DOPPLER COMPLETE: CPT | Performed by: INTERNAL MEDICINE

## 2017-04-10 PROCEDURE — 94799 UNLISTED PULMONARY SVC/PX: CPT

## 2017-04-10 PROCEDURE — 80061 LIPID PANEL: CPT | Performed by: FAMILY MEDICINE

## 2017-04-10 PROCEDURE — 81003 URINALYSIS AUTO W/O SCOPE: CPT | Performed by: FAMILY MEDICINE

## 2017-04-10 PROCEDURE — 93306 TTE W/DOPPLER COMPLETE: CPT

## 2017-04-10 PROCEDURE — 84484 ASSAY OF TROPONIN QUANT: CPT | Performed by: FAMILY MEDICINE

## 2017-04-10 PROCEDURE — G0378 HOSPITAL OBSERVATION PER HR: HCPCS

## 2017-04-10 PROCEDURE — 25010000002 METHYLPREDNISOLONE PER 40 MG: Performed by: FAMILY MEDICINE

## 2017-04-10 PROCEDURE — 96376 TX/PRO/DX INJ SAME DRUG ADON: CPT

## 2017-04-10 PROCEDURE — 25010000002 ONDANSETRON PER 1 MG: Performed by: FAMILY MEDICINE

## 2017-04-10 PROCEDURE — 85025 COMPLETE CBC W/AUTO DIFF WBC: CPT | Performed by: FAMILY MEDICINE

## 2017-04-10 PROCEDURE — 87086 URINE CULTURE/COLONY COUNT: CPT | Performed by: FAMILY MEDICINE

## 2017-04-10 PROCEDURE — 93005 ELECTROCARDIOGRAM TRACING: CPT | Performed by: FAMILY MEDICINE

## 2017-04-10 PROCEDURE — 80048 BASIC METABOLIC PNL TOTAL CA: CPT | Performed by: FAMILY MEDICINE

## 2017-04-10 PROCEDURE — 93010 ELECTROCARDIOGRAM REPORT: CPT | Performed by: INTERNAL MEDICINE

## 2017-04-10 PROCEDURE — 94760 N-INVAS EAR/PLS OXIMETRY 1: CPT

## 2017-04-10 RX ORDER — ASPIRIN 81 MG/1
81 TABLET ORAL DAILY
Qty: 30 TABLET | Refills: 1 | Status: SHIPPED | OUTPATIENT
Start: 2017-04-10

## 2017-04-10 RX ORDER — FUROSEMIDE 20 MG/1
20 TABLET ORAL 2 TIMES DAILY
Status: DISCONTINUED | OUTPATIENT
Start: 2017-04-10 | End: 2017-04-10 | Stop reason: HOSPADM

## 2017-04-10 RX ORDER — ALBUTEROL SULFATE 90 UG/1
2 AEROSOL, METERED RESPIRATORY (INHALATION) EVERY 4 HOURS PRN
Qty: 1 INHALER | Refills: 1 | Status: SHIPPED | OUTPATIENT
Start: 2017-04-10

## 2017-04-10 RX ORDER — FUROSEMIDE 20 MG/1
20 TABLET ORAL 2 TIMES DAILY
Qty: 60 TABLET | Refills: 1 | Status: SHIPPED | OUTPATIENT
Start: 2017-04-10 | End: 2018-04-07

## 2017-04-10 RX ADMIN — IPRATROPIUM BROMIDE AND ALBUTEROL SULFATE 3 ML: 2.5; .5 SOLUTION RESPIRATORY (INHALATION) at 07:05

## 2017-04-10 RX ADMIN — HYDROCODONE BITARTRATE AND ACETAMINOPHEN 1 TABLET: 7.5; 325 TABLET ORAL at 03:10

## 2017-04-10 RX ADMIN — Medication 10 ML: at 08:41

## 2017-04-10 RX ADMIN — FUROSEMIDE 20 MG: 20 TABLET ORAL at 10:33

## 2017-04-10 RX ADMIN — METHYLPREDNISOLONE SODIUM SUCCINATE 40 MG: 40 INJECTION, POWDER, FOR SOLUTION INTRAMUSCULAR; INTRAVENOUS at 05:37

## 2017-04-10 RX ADMIN — CARBIDOPA AND LEVODOPA 1 TABLET: 25; 100 TABLET ORAL at 08:39

## 2017-04-10 RX ADMIN — ONDANSETRON 4 MG: 2 INJECTION INTRAMUSCULAR; INTRAVENOUS at 07:01

## 2017-04-10 RX ADMIN — HYDROCODONE BITARTRATE AND ACETAMINOPHEN 1 TABLET: 7.5; 325 TABLET ORAL at 12:25

## 2017-04-10 RX ADMIN — AMLODIPINE BESYLATE 10 MG: 10 TABLET ORAL at 08:39

## 2017-04-10 RX ADMIN — GABAPENTIN 300 MG: 300 CAPSULE ORAL at 05:35

## 2017-04-10 RX ADMIN — PANTOPRAZOLE SODIUM 40 MG: 40 TABLET, DELAYED RELEASE ORAL at 08:40

## 2017-04-10 NOTE — PLAN OF CARE
Problem: Patient Care Overview (Adult)  Goal: Plan of Care Review  Outcome: Ongoing (interventions implemented as appropriate)  Goal: Adult Individualization and Mutuality  Outcome: Ongoing (interventions implemented as appropriate)  Goal: Discharge Needs Assessment  Outcome: Ongoing (interventions implemented as appropriate)    Problem: Pain, Acute (Adult)  Goal: Acceptable Pain Control/Comfort Level  Outcome: Ongoing (interventions implemented as appropriate)    Problem: Respiratory Insufficiency (Adult)  Goal: Acid/Base Balance  Outcome: Ongoing (interventions implemented as appropriate)  Goal: Effective Ventilation  Outcome: Ongoing (interventions implemented as appropriate)

## 2017-04-10 NOTE — CONSULTS
Adult Nutrition  Assessment    Patient Name:  Lili Arguello  YOB: 1950  MRN: [id number]  Admit Date:  4/9/2017    Assessment Date:  4/10/2017          Reason for Assessment       04/10/17 0959    Reason for Assessment    Reason For Assessment/Visit per organizational policy   BMI >40                    Labs/Tests/Procedures/Meds       04/10/17 1000    Labs/Tests/Procedures/Meds    Labs/Tests Review Reviewed    Medication Review Reviewed, pertinent                Nutrition Prescription Ordered       04/10/17 1000    Nutrition Prescription PO    Common Modifiers Cardiac;Consistent Carbohydrate              Comments:  Pt on RD list r/t BMI > 40, which is compatible w/morbid obesity. Pt voiced interest in education and is also interested in outpt counseling after d/c. Pt concerned w/diabetic diet order and wants that d/c'd stating she does not have DM and is highly allergic to artificial sweeteners--RD will d/c ADA restriction in effort to reduce chance of pt receiving artificial sweetener on trays. Pt's blood glucose is elevated (252), but pt is on steroids at this time. RD will monitor.         Electronically signed by:  Estella Zarate RD  04/10/17 10:05 AM

## 2017-04-10 NOTE — DISCHARGE SUMMARY
Discharge Summary  Umer Dumont MD  Hospitalist     Date of Discharge:  4/10/2017    Discharge Diagnosis:    Principal Problem:    Dyspnea on exertion  Active Problems:    Morbid obesity    LVH (left ventricular hypertrophy)    Diastolic dysfunction    Hypertension    Diabetes mellitus    GERD (gastroesophageal reflux disease)      Presenting Problem/History of Present Illness    Worsening dyspnea    Hospital Course  Patient is a 66 y.o. female presented for dyspnea. She had a negative chest x-ray, negative DDimer, negative cardiac enzymes. Her EKG shows sinus tachycardia.  Cardiac echo shows a normal LV EF but severe LVH / diastolic dysfunction consistent with hypertensive cardiomyopathy.      Procedures Performed      Consults:   Consults     Date and Time Order Name Status Description    4/9/2017 1731 Hospitalist (on-call MD unless specified)            Pertinent Test Results: cardiac graphics: Echocardiogram: normal LV EF, severe LVH, diastolic dysfunction.    Condition on Discharge:  stable    Vital Signs  Temp:  [97.7 °F (36.5 °C)-99.5 °F (37.5 °C)] 97.7 °F (36.5 °C)  Heart Rate:  [] 104  Resp:  [18] 18  BP: (121-180)/(60-87) 146/65    Physical Exam:  Physical Exam   Constitutional: She is oriented to person, place, and time.   Morbidly obese   HENT:   Head: Normocephalic and atraumatic.   Eyes: EOM are normal. Pupils are equal, round, and reactive to light. No scleral icterus.   Neck: Neck supple. No thyromegaly present.   Cardiovascular: Normal rate and regular rhythm.    Pulmonary/Chest: Effort normal and breath sounds normal. No respiratory distress. She has no wheezes. She has no rales.   Abdominal: Soft. She exhibits no distension and no mass. There is no tenderness. There is no guarding.   Musculoskeletal: She exhibits edema (minimal).   Neurological: She is alert and oriented to person, place, and time.   Skin: Skin is warm and dry.   Psychiatric: She has a normal mood and affect. Her behavior  is normal.   Vitals reviewed.      Discharge Disposition      Discharge Medications   Lili Arguello   Home Medication Instructions SASHA:[id number]    Printed on:04/10/17 4496   Medication Information                      albuterol (PROVENTIL HFA;VENTOLIN HFA) 108 (90 BASE) MCG/ACT inhaler  Inhale 2 puffs Every 4 (Four) Hours As Needed for Wheezing.             amLODIPine (NORVASC) 10 MG tablet  TAKE 1 TABLET BY MOUTH ONCE DAILY             aspirin 81 MG EC tablet  Take 1 tablet by mouth Daily.             carbidopa-levodopa (SINEMET)  MG per tablet  Take 1 tablet by mouth 3 (Three) Times a Day.             furosemide (LASIX) 20 MG tablet  Take 1 tablet by mouth 2 (Two) Times a Day.             gabapentin (NEURONTIN) 300 MG capsule  TAKE 1 CAPSULE BY ORAL ROUTE 3 TIMES A DAY             HYDROcodone-acetaminophen (NORCO) 7.5-325 MG per tablet  Take 1 tablet by mouth Every 6 (Six) Hours As Needed for moderate pain (4-6).             metaxalone (SKELAXIN) 800 MG tablet  Take 800 mg by mouth 4 (Four) Times a Day As Needed for Muscle Spasms.             pantoprazole (PROTONIX) 40 MG EC tablet  Take 40 mg by mouth 2 (Two) Times a Day.             sucralfate (CARAFATE) 1 G tablet  Take 1 g by mouth 3 (Three) Times a Day As Needed (Stomach).             zolpidem (AMBIEN) 10 MG tablet  Take 10 mg by mouth At Night As Needed.                 Discharge Diet:   Diet Instructions     Advance Diet As Tolerated                     Activity at Discharge:   Activity Instructions     Activity as Tolerated                     Follow-up Appointments  No future appointments.  Additional Instructions for the Follow-ups that You Need to Schedule     Discharge Follow-up with PCP    As directed    Follow Up Details:  in 1 week                 Test Results Pending at Discharge   Order Current Status    Blood Culture Preliminary result    Blood Culture Preliminary result    Urine Culture Preliminary result           Umer  MD Tim  04/10/17  1:03 PM

## 2017-04-11 LAB
BACTERIA SPEC AEROBE CULT: ABNORMAL
BACTERIA SPEC AEROBE CULT: ABNORMAL
GRAM STN SPEC: ABNORMAL
ISOLATED FROM: ABNORMAL

## 2017-04-14 LAB
BACTERIA SPEC AEROBE CULT: NORMAL
BACTERIA SPEC AEROBE CULT: NORMAL

## 2018-06-27 ENCOUNTER — TRANSCRIBE ORDERS (OUTPATIENT)
Dept: LAB | Facility: HOSPITAL | Age: 68
End: 2018-06-27

## 2018-06-27 DIAGNOSIS — I10 ESSENTIAL HYPERTENSION: ICD-10-CM

## 2018-06-27 DIAGNOSIS — N17.9 ACUTE RENAL FAILURE, UNSPECIFIED ACUTE RENAL FAILURE TYPE (HCC): ICD-10-CM

## 2018-06-27 DIAGNOSIS — R30.0 DYSURIA: ICD-10-CM

## 2018-06-27 DIAGNOSIS — M54.5 LOW BACK PAIN, UNSPECIFIED BACK PAIN LATERALITY, UNSPECIFIED CHRONICITY, WITH SCIATICA PRESENCE UNSPECIFIED: Primary | ICD-10-CM

## 2018-06-27 DIAGNOSIS — N18.4 CHRONIC RENAL DISEASE, STAGE IV (HCC): ICD-10-CM

## 2018-06-27 DIAGNOSIS — K27.9 PEPTIC ULCER: ICD-10-CM

## 2018-07-09 ENCOUNTER — LAB (OUTPATIENT)
Dept: LAB | Facility: HOSPITAL | Age: 68
End: 2018-07-09

## 2018-07-09 DIAGNOSIS — K27.9 PEPTIC ULCER: ICD-10-CM

## 2018-07-09 DIAGNOSIS — N17.9 ACUTE RENAL FAILURE, UNSPECIFIED ACUTE RENAL FAILURE TYPE (HCC): ICD-10-CM

## 2018-07-09 DIAGNOSIS — N18.4 CHRONIC RENAL DISEASE, STAGE IV (HCC): ICD-10-CM

## 2018-07-09 DIAGNOSIS — I10 ESSENTIAL HYPERTENSION: ICD-10-CM

## 2018-07-09 DIAGNOSIS — M54.5 LOW BACK PAIN, UNSPECIFIED BACK PAIN LATERALITY, UNSPECIFIED CHRONICITY, WITH SCIATICA PRESENCE UNSPECIFIED: ICD-10-CM

## 2018-07-09 DIAGNOSIS — R30.0 DYSURIA: ICD-10-CM

## 2018-07-09 LAB
25(OH)D3 SERPL-MCNC: 28.9 NG/ML (ref 30–100)
ALBUMIN SERPL-MCNC: 4.3 G/DL (ref 3.4–4.8)
ANION GAP SERPL CALCULATED.3IONS-SCNC: 8 MMOL/L (ref 5–15)
BUN BLD-MCNC: 23 MG/DL (ref 7–21)
BUN/CREAT SERPL: 16.1 (ref 7–25)
CALCIUM SPEC-SCNC: 9.1 MG/DL (ref 8.4–10.2)
CHLORIDE SERPL-SCNC: 99 MMOL/L (ref 95–110)
CO2 SERPL-SCNC: 30 MMOL/L (ref 22–31)
CREAT BLD-MCNC: 1.43 MG/DL (ref 0.5–1)
GFR SERPL CREATININE-BSD FRML MDRD: 36 ML/MIN/1.73 (ref 45–104)
GLUCOSE BLD-MCNC: 93 MG/DL (ref 60–100)
HCT VFR BLD AUTO: 46.5 % (ref 35–45)
HGB BLD-MCNC: 15.1 G/DL (ref 12–15.5)
PHOSPHATE SERPL-MCNC: 3.6 MG/DL (ref 2.4–4.4)
POTASSIUM BLD-SCNC: 4.9 MMOL/L (ref 3.5–5.1)
SODIUM BLD-SCNC: 137 MMOL/L (ref 137–145)

## 2018-07-09 PROCEDURE — 80069 RENAL FUNCTION PANEL: CPT

## 2018-07-09 PROCEDURE — 85018 HEMOGLOBIN: CPT

## 2018-07-09 PROCEDURE — 36415 COLL VENOUS BLD VENIPUNCTURE: CPT

## 2018-07-09 PROCEDURE — 82306 VITAMIN D 25 HYDROXY: CPT

## 2018-07-09 PROCEDURE — 85014 HEMATOCRIT: CPT

## 2018-09-13 ENCOUNTER — TRANSCRIBE ORDERS (OUTPATIENT)
Dept: LAB | Facility: HOSPITAL | Age: 68
End: 2018-09-13

## 2018-09-13 ENCOUNTER — LAB (OUTPATIENT)
Dept: LAB | Facility: HOSPITAL | Age: 68
End: 2018-09-13

## 2018-09-13 DIAGNOSIS — I10 ESSENTIAL (PRIMARY) HYPERTENSION: ICD-10-CM

## 2018-09-13 DIAGNOSIS — R30.0 DYSURIA: ICD-10-CM

## 2018-09-13 DIAGNOSIS — R32 URINARY INCONTINENCE, UNSPECIFIED TYPE: ICD-10-CM

## 2018-09-13 DIAGNOSIS — K27.9 PEPTIC ULCER: ICD-10-CM

## 2018-09-13 DIAGNOSIS — M54.50 LOIN PAIN: ICD-10-CM

## 2018-09-13 DIAGNOSIS — M54.50 LOIN PAIN: Primary | ICD-10-CM

## 2018-09-13 LAB
ALBUMIN SERPL-MCNC: 4.3 G/DL (ref 3.4–4.8)
ANION GAP SERPL CALCULATED.3IONS-SCNC: 11 MMOL/L (ref 5–15)
BILIRUB UR QL STRIP: ABNORMAL
BUN BLD-MCNC: 18 MG/DL (ref 7–21)
BUN/CREAT SERPL: 13.8 (ref 7–25)
CALCIUM SPEC-SCNC: 9.2 MG/DL (ref 8.4–10.2)
CHLORIDE SERPL-SCNC: 103 MMOL/L (ref 95–110)
CLARITY UR: CLEAR
CO2 SERPL-SCNC: 26 MMOL/L (ref 22–31)
COLOR UR: YELLOW
CREAT BLD-MCNC: 1.3 MG/DL (ref 0.5–1)
CREAT UR-MCNC: 278.1 MG/DL
GFR SERPL CREATININE-BSD FRML MDRD: 41 ML/MIN/1.73 (ref 45–104)
GLUCOSE BLD-MCNC: 116 MG/DL (ref 60–100)
GLUCOSE UR STRIP-MCNC: NEGATIVE MG/DL
HGB UR QL STRIP.AUTO: NEGATIVE
KETONES UR QL STRIP: ABNORMAL
LEUKOCYTE ESTERASE UR QL STRIP.AUTO: ABNORMAL
MAGNESIUM SERPL-MCNC: 2.2 MG/DL (ref 1.6–2.3)
NITRITE UR QL STRIP: NEGATIVE
PH UR STRIP.AUTO: 5.5 [PH] (ref 5–9)
PHOSPHATE SERPL-MCNC: 2.8 MG/DL (ref 2.4–4.4)
POTASSIUM BLD-SCNC: 4.8 MMOL/L (ref 3.5–5.1)
PROT UR QL STRIP: NEGATIVE
PROT UR-MCNC: 6.9 MG/DL
PROT/CREAT UR: 24.8 MG/G CREA (ref 0–200)
SODIUM BLD-SCNC: 140 MMOL/L (ref 137–145)
SP GR UR STRIP: 1.03 (ref 1–1.03)
URATE SERPL-MCNC: 6.7 MG/DL (ref 2.5–8.5)
UROBILINOGEN UR QL STRIP: ABNORMAL

## 2018-09-13 PROCEDURE — 81003 URINALYSIS AUTO W/O SCOPE: CPT

## 2018-09-13 PROCEDURE — 36415 COLL VENOUS BLD VENIPUNCTURE: CPT

## 2018-09-13 PROCEDURE — 87086 URINE CULTURE/COLONY COUNT: CPT

## 2018-09-13 PROCEDURE — 83735 ASSAY OF MAGNESIUM: CPT

## 2018-09-13 PROCEDURE — 82570 ASSAY OF URINE CREATININE: CPT

## 2018-09-13 PROCEDURE — 84156 ASSAY OF PROTEIN URINE: CPT

## 2018-09-13 PROCEDURE — 84550 ASSAY OF BLOOD/URIC ACID: CPT

## 2018-09-13 PROCEDURE — 80069 RENAL FUNCTION PANEL: CPT

## 2018-09-14 LAB — BACTERIA SPEC AEROBE CULT: NORMAL

## 2018-12-22 PROBLEM — J98.8 VIRAL RESPIRATORY ILLNESS: Status: ACTIVE | Noted: 2018-12-22

## 2018-12-22 PROBLEM — B97.89 VIRAL RESPIRATORY ILLNESS: Status: ACTIVE | Noted: 2018-12-22

## 2019-04-10 ENCOUNTER — DOCUMENTATION (OUTPATIENT)
Dept: ONCOLOGY | Facility: CLINIC | Age: 69
End: 2019-04-10

## 2019-04-11 NOTE — ACP (ADVANCE CARE PLANNING)
Date of First Steps ACP interview: 4/10/19  [facility]  First Steps ACP Facilitator: Cheri Beard LCSW  Referral Source: self   Present for facilitation: Brandorenato Arguello, Spouse     SUMMARY OF ADVANCE CARE PLANNING DISCUSSION:  Lili presents for First Steps facilitation. We reviewed purpose and goals for advance care planning.    I reviewed with Lili qualities to consider when choosing a healthcare agent. Lili  Designated her , Brando Arguello and Sally Erajadyn as her health care agents.    I encouraged Lili to discuss her preferences for future care with healthcare agents and others close to her.    Lili describes past experiences dealing with friends or family who have been seriously ill:     Goals of medical care for severe, permanent brain injury were explored: yes    Each section of the advance care/living will document was reviewed and discussed.    Advance care/living will document finished during this facilitation? yes    Time spent on preparation, facilitation and documentation was 60 minutes . Document scanned into [facility] EMR, pt provided original and two copies.       Cheri Beard LCSW

## 2019-05-17 ENCOUNTER — TRANSCRIBE ORDERS (OUTPATIENT)
Dept: LAB | Facility: HOSPITAL | Age: 69
End: 2019-05-17

## 2019-05-17 ENCOUNTER — APPOINTMENT (OUTPATIENT)
Dept: LAB | Facility: HOSPITAL | Age: 69
End: 2019-05-17

## 2019-05-17 DIAGNOSIS — K27.9 PEPTIC ULCER WITHOUT HEMORRHAGE OR PERFORATION BUT WITH OBSTRUCTION (HCC): ICD-10-CM

## 2019-05-17 DIAGNOSIS — R32 URINARY INCONTINENCE, UNSPECIFIED TYPE: ICD-10-CM

## 2019-05-17 DIAGNOSIS — N17.9 ACUTE RENAL FAILURE, UNSPECIFIED ACUTE RENAL FAILURE TYPE (HCC): ICD-10-CM

## 2019-05-17 DIAGNOSIS — R30.0 DYSURIA: ICD-10-CM

## 2019-05-17 DIAGNOSIS — I10 ESSENTIAL (PRIMARY) HYPERTENSION: ICD-10-CM

## 2019-05-17 DIAGNOSIS — M54.50 LOIN PAIN: Primary | ICD-10-CM

## 2019-05-17 DIAGNOSIS — N18.4 CHRONIC KIDNEY DISEASE, STAGE IV (SEVERE) (HCC): ICD-10-CM

## 2019-05-17 DIAGNOSIS — K56.609 PEPTIC ULCER WITHOUT HEMORRHAGE OR PERFORATION BUT WITH OBSTRUCTION (HCC): ICD-10-CM

## 2019-05-17 LAB
25(OH)D3 SERPL-MCNC: 12 NG/ML (ref 30–100)
ALBUMIN SERPL-MCNC: 4 G/DL (ref 3.5–5.2)
ANION GAP SERPL CALCULATED.3IONS-SCNC: 13.2 MMOL/L
BUN BLD-MCNC: 12 MG/DL (ref 8–23)
BUN/CREAT SERPL: 8.8 (ref 7–25)
CALCIUM SPEC-SCNC: 9.1 MG/DL (ref 8.6–10.5)
CHLORIDE SERPL-SCNC: 102 MMOL/L (ref 98–107)
CO2 SERPL-SCNC: 25.8 MMOL/L (ref 22–29)
CREAT BLD-MCNC: 1.36 MG/DL (ref 0.57–1)
GFR SERPL CREATININE-BSD FRML MDRD: 39 ML/MIN/1.73
GLUCOSE BLD-MCNC: 115 MG/DL (ref 65–99)
HCT VFR BLD AUTO: 42.8 % (ref 34–46.6)
HGB BLD-MCNC: 14.3 G/DL (ref 12–15.9)
PHOSPHATE SERPL-MCNC: 3.5 MG/DL (ref 2.5–4.5)
POTASSIUM BLD-SCNC: 4.3 MMOL/L (ref 3.5–5.2)
SODIUM BLD-SCNC: 141 MMOL/L (ref 136–145)

## 2019-05-17 PROCEDURE — 85018 HEMOGLOBIN: CPT | Performed by: INTERNAL MEDICINE

## 2019-05-17 PROCEDURE — 80069 RENAL FUNCTION PANEL: CPT | Performed by: INTERNAL MEDICINE

## 2019-05-17 PROCEDURE — 36415 COLL VENOUS BLD VENIPUNCTURE: CPT | Performed by: INTERNAL MEDICINE

## 2019-05-17 PROCEDURE — 85014 HEMATOCRIT: CPT | Performed by: INTERNAL MEDICINE

## 2019-05-17 PROCEDURE — 82306 VITAMIN D 25 HYDROXY: CPT | Performed by: INTERNAL MEDICINE

## 2020-06-22 ENCOUNTER — OFFICE VISIT (OUTPATIENT)
Dept: GASTROENTEROLOGY | Facility: CLINIC | Age: 70
End: 2020-06-22

## 2020-06-22 VITALS
SYSTOLIC BLOOD PRESSURE: 116 MMHG | BODY MASS INDEX: 44.61 KG/M2 | HEIGHT: 63 IN | HEART RATE: 77 BPM | WEIGHT: 251.8 LBS | DIASTOLIC BLOOD PRESSURE: 67 MMHG

## 2020-06-22 DIAGNOSIS — Z87.11 HISTORY OF GASTRIC ULCER: Primary | ICD-10-CM

## 2020-06-22 DIAGNOSIS — K21.00 GASTROESOPHAGEAL REFLUX DISEASE WITH ESOPHAGITIS: ICD-10-CM

## 2020-06-22 DIAGNOSIS — R10.13 EPIGASTRIC PAIN: ICD-10-CM

## 2020-06-22 PROBLEM — G47.00 INSOMNIA: Status: ACTIVE | Noted: 2019-11-20

## 2020-06-22 PROBLEM — G62.9 NEUROPATHY: Status: ACTIVE | Noted: 2019-11-20

## 2020-06-22 PROBLEM — G25.81 RESTLESS LEG SYNDROME: Status: ACTIVE | Noted: 2019-11-20

## 2020-06-22 PROBLEM — M10.9 GOUT: Status: ACTIVE | Noted: 2019-11-20

## 2020-06-22 PROBLEM — R60.0 LOWER EXTREMITY EDEMA: Status: ACTIVE | Noted: 2019-11-20

## 2020-06-22 PROBLEM — M54.50 LOW BACK PAIN: Status: ACTIVE | Noted: 2019-11-20

## 2020-06-22 PROCEDURE — 99214 OFFICE O/P EST MOD 30 MIN: CPT | Performed by: NURSE PRACTITIONER

## 2020-06-22 RX ORDER — CYCLOBENZAPRINE HCL 10 MG
TABLET ORAL
COMMUNITY
Start: 2020-05-29 | End: 2020-08-18 | Stop reason: HOSPADM

## 2020-06-22 RX ORDER — SODIUM CHLORIDE 0.9 % (FLUSH) 0.9 %
10 SYRINGE (ML) INJECTION AS NEEDED
Status: CANCELLED | OUTPATIENT
Start: 2020-07-06

## 2020-06-22 RX ORDER — SODIUM CHLORIDE 0.9 % (FLUSH) 0.9 %
3 SYRINGE (ML) INJECTION EVERY 12 HOURS SCHEDULED
Status: CANCELLED | OUTPATIENT
Start: 2020-07-06

## 2020-06-22 RX ORDER — DEXTROSE AND SODIUM CHLORIDE 5; .45 G/100ML; G/100ML
30 INJECTION, SOLUTION INTRAVENOUS CONTINUOUS PRN
Status: CANCELLED | OUTPATIENT
Start: 2020-07-06

## 2020-06-22 NOTE — PROGRESS NOTES
Chief Complaint   Patient presents with   • hx of ulcer       Subjective    Lili Arguello is a 69 y.o. female. she is here today for follow-up.    History of Present Illness  69-year-old female presents to discuss epigastric pain history of large gastric ulcer and nausea.  She reports increased stress caring for her  who was diagnosed with esophageal cancer Brando Arguello.  Reports frequent abdominal pain nausea but denies any vomiting.  Denies any melanotic stools but reports stool has been darker than usual for her.  Reports decreased appetite.  Plan; schedule patient for EGD due to upper abdominal pain, history of bleeding gastric ulcer requiring blood transfusion and GERD symptoms.  Continue PPI daily           The following portions of the patient's history were reviewed and updated as appropriate:   Past Medical History:   Diagnosis Date   • Chronic gastric ulcer without hemorrhage and without perforation    • Depressive disorder    • Diabetes mellitus (CMS/HCC)    • GERD (gastroesophageal reflux disease)    • Gout    • Hypertension    • Insomnia    • Morbid obesity (CMS/HCC)    • Osteoarthritis of multiple joints      Past Surgical History:   Procedure Laterality Date   • BREAST SURGERY     • CHOLECYSTECTOMY     • GASTRECTOMY     • KNEE SURGERY Bilateral 10/02/2014     Family History   Problem Relation Age of Onset   • Lung cancer Other      OB History    None       Prior to Admission medications    Medication Sig Start Date End Date Taking? Authorizing Provider   albuterol (PROVENTIL HFA;VENTOLIN HFA) 108 (90 BASE) MCG/ACT inhaler Inhale 2 puffs Every 4 (Four) Hours As Needed for Wheezing. 4/10/17  Yes Umer Dumont MD   aspirin 81 MG EC tablet Take 1 tablet by mouth Daily. 4/10/17  Yes Umer Dumont MD   carbidopa-levodopa (SINEMET)  MG per tablet Take 1 tablet by mouth 3 (Three) Times a Day.   Yes Emergency, Nurse Epic, RN   cyclobenzaprine (FLEXERIL) 10 MG tablet TAKE ONE TABLET  BY MOUTH TWO TIMES DAILY AS NEEDED FOR MUSCLE SPASMS 5/29/20  Yes ProviderLoki MD   gabapentin (NEURONTIN) 300 MG capsule TAKE 1 CAPSULE BY ORAL ROUTE 3 TIMES A DAY 3/24/17  Yes ProviderLoki MD   HYDROcodone-acetaminophen (NORCO) 7.5-325 MG per tablet Take 1 tablet by mouth Every 6 (Six) Hours As Needed for moderate pain (4-6).   Yes Loki Chisholm MD   isosorbide mononitrate (IMDUR) 30 MG 24 hr tablet Take 30 mg by mouth Daily.   Yes Emergency, Nurse Kallie RN   metoprolol tartrate (LOPRESSOR) 25 MG tablet Take 25 mg by mouth 2 (Two) Times a Day.   Yes Emergency, Nurse Kallie RN   ondansetron ODT (ZOFRAN-ODT) 8 MG disintegrating tablet Take 1 tablet by mouth Every 8 (Eight) Hours As Needed for Nausea or Vomiting. 4/7/18  Yes Mariya Phelps APRN   spironolactone (ALDACTONE) 25 MG tablet Take 25 mg by mouth Daily.   Yes Emergency, Nurse Epic, RN   zolpidem (AMBIEN) 10 MG tablet Take 10 mg by mouth At Night As Needed. 3/20/17  Yes Loki Chisholm MD   amLODIPine (NORVASC) 5 MG tablet  4/1/18 6/22/20  Emergency, Nurse Kallie RN   diclofenac (VOLTAREN) 1 % gel gel Apply 4 g topically to the appropriate area as directed 4 (Four) Times a Day As Needed (pain). 1/8/19 6/22/20  Rekha Santoro APRN   metaxalone (SKELAXIN) 800 MG tablet Take 800 mg by mouth 4 (Four) Times a Day As Needed for Muscle Spasms.  6/22/20  Loki Chisholm MD   pantoprazole (PROTONIX) 40 MG EC tablet Take 40 mg by mouth 2 (Two) Times a Day.  6/22/20  Loki Chisholm MD   Potassium 99 MG tablet Take  by mouth.  6/22/20  Emergency, Nurse Kallie RN   promethazine-dextromethorphan (PROMETHAZINE-DM) 6.25-15 MG/5ML syrup Take 5 mL by mouth At Night As Needed for Cough. 12/22/18 6/22/20  Timothy, Nhi D, APRN     Allergies   Allergen Reactions   • Iodine       iodine:  UNRESPONSIVENESS   • Other      ARTIFICIAL SWEETNER:  Rash: TOPICAL ANTIBACERTIAL AGENTS:  Rash   • Benzoyl Peroxide Rash   • Latex  "Rash   • Tape Rash     Social History     Socioeconomic History   • Marital status:      Spouse name: Not on file   • Number of children: Not on file   • Years of education: Not on file   • Highest education level: Not on file   Tobacco Use   • Smoking status: Former Smoker     Last attempt to quit: 2013     Years since quittin.2   • Smokeless tobacco: Never Used   • Tobacco comment: 30 pack year history   Substance and Sexual Activity   • Alcohol use: No   • Drug use: Yes     Types: Oxycodone     Comment: prescribed norco q6 prn    • Sexual activity: Defer       Review of Systems  Review of Systems   Constitutional: Positive for appetite change and fatigue. Negative for activity change, chills, diaphoresis, fever and unexpected weight change.   HENT: Negative for sore throat and trouble swallowing.    Respiratory: Negative for shortness of breath.    Gastrointestinal: Positive for abdominal pain and nausea. Negative for abdominal distention, anal bleeding, blood in stool, constipation, diarrhea, rectal pain and vomiting.   Musculoskeletal: Negative for arthralgias.   Skin: Negative for pallor.   Neurological: Negative for light-headedness.        /67 (BP Location: Left arm)   Pulse 77   Ht 160 cm (63\")   Wt 114 kg (251 lb 12.8 oz)   BMI 44.60 kg/m²     Objective    Physical Exam   Constitutional: She is oriented to person, place, and time. She appears well-developed and well-nourished. She is cooperative. No distress.   HENT:   Head: Normocephalic and atraumatic.   Neck: Normal range of motion. Neck supple. No thyromegaly present.   Cardiovascular: Normal rate, regular rhythm and normal heart sounds.   Pulmonary/Chest: Effort normal and breath sounds normal. She has no wheezes. She has no rhonchi. She has no rales.   Abdominal: Soft. Normal appearance and bowel sounds are normal. She exhibits no distension. There is no hepatosplenomegaly. There is tenderness in the epigastric area. There is " no rigidity and no guarding. No hernia.   Lymphadenopathy:     She has no cervical adenopathy.   Neurological: She is alert and oriented to person, place, and time.   Skin: Skin is warm, dry and intact. No rash noted. No pallor.   Psychiatric: She has a normal mood and affect. Her speech is normal.     Transcribe Orders on 05/17/2019   Component Date Value Ref Range Status   • Glucose 05/17/2019 115* 65 - 99 mg/dL Final   • BUN 05/17/2019 12  8 - 23 mg/dL Final   • Creatinine 05/17/2019 1.36* 0.57 - 1.00 mg/dL Final   • Sodium 05/17/2019 141  136 - 145 mmol/L Final   • Potassium 05/17/2019 4.3  3.5 - 5.2 mmol/L Final   • Chloride 05/17/2019 102  98 - 107 mmol/L Final   • CO2 05/17/2019 25.8  22.0 - 29.0 mmol/L Final   • Calcium 05/17/2019 9.1  8.6 - 10.5 mg/dL Final   • Albumin 05/17/2019 4.00  3.50 - 5.20 g/dL Final   • Phosphorus 05/17/2019 3.5  2.5 - 4.5 mg/dL Final   • Anion Gap 05/17/2019 13.2  mmol/L Final   • BUN/Creatinine Ratio 05/17/2019 8.8  7.0 - 25.0 Final   • eGFR Non  Amer 05/17/2019 39* >60 mL/min/1.73 Final   • Hemoglobin 05/17/2019 14.3  12.0 - 15.9 g/dL Final   • Hematocrit 05/17/2019 42.8  34.0 - 46.6 % Final   • 25 Hydroxy, Vitamin D 05/17/2019 12.0* 30.0 - 100.0 ng/ml Final     Assessment/Plan      1. History of gastric ulcer    2. Epigastric pain    3. Gastroesophageal reflux disease with esophagitis    .       Orders placed during this encounter include:  Orders Placed This Encounter   Procedures   • Follow Anesthesia Guidelines / Standing Orders     Standing Status:   Future   • Obtain Informed Consent     Standing Status:   Future     Order Specific Question:   Informed Consent Given For     Answer:   ESOPHAGOGASTRODUODENOSCOPY possible dilation       ESOPHAGOGASTRODUODENOSCOPY possible dilation Fri (N/A)    Review and/or summary of lab tests, radiology, procedures, medications. Review and summary of old records and obtaining of history. The risks and benefits of my recommendations,  as well as other treatment options were discussed with the patient today. Questions were answered.    No orders of the defined types were placed in this encounter.  Patient is due for screening colonoscopy offered to schedule screening colonoscopy which she declines to schedule at this time due to inability to drink prep she will reschedule that at a later date    Follow-up: Return in about 4 weeks (around 7/20/2020) for Recheck.          This document has been electronically signed by TOBI Gallagher on June 22, 2020 14:51             Results for orders placed or performed in visit on 05/17/19   Hemoglobin & Hematocrit, Blood   Result Value Ref Range    Hemoglobin 14.3 12.0 - 15.9 g/dL    Hematocrit 42.8 34.0 - 46.6 %   Vitamin D 25 Hydroxy   Result Value Ref Range    25 Hydroxy, Vitamin D 12.0 (L) 30.0 - 100.0 ng/ml   Renal Function Panel   Result Value Ref Range    Glucose 115 (H) 65 - 99 mg/dL    BUN 12 8 - 23 mg/dL    Creatinine 1.36 (H) 0.57 - 1.00 mg/dL    Sodium 141 136 - 145 mmol/L    Potassium 4.3 3.5 - 5.2 mmol/L    Chloride 102 98 - 107 mmol/L    CO2 25.8 22.0 - 29.0 mmol/L    Calcium 9.1 8.6 - 10.5 mg/dL    Albumin 4.00 3.50 - 5.20 g/dL    Phosphorus 3.5 2.5 - 4.5 mg/dL    Anion Gap 13.2 mmol/L    BUN/Creatinine Ratio 8.8 7.0 - 25.0    eGFR Non African Amer 39 (L) >60 mL/min/1.73   Results for orders placed or performed during the hospital encounter of 12/22/18   POCT Influenza A/B   Result Value Ref Range    Rapid Influenza A Ag negative Negative    Rapid Influenza B Ag negative Negative    Internal Control Passed Passed    Lot Number [id number]     Expiration Date 9/30/2020    Results for orders placed or performed in visit on 09/13/18   Protein / Creatinine Ratio, Urine - Urine, Clean Catch   Result Value Ref Range    Protein/Creatinine Ratio, Urine 24.8 0.0 - 200.0 mg/G Crea    Creatinine, Urine 278.1 mg/dL    Total Protein, Urine 6.9 mg/dL   Urinalysis without microscopic (no culture) - Urine,  Clean Catch   Result Value Ref Range    Color, UA Yellow Yellow, Straw, Dark Yellow, Alyce    Appearance, UA Clear Clear    pH, UA 5.5 5.0 - 9.0    Specific Gravity, UA 1.034 (H) 1.003 - 1.030    Glucose, UA Negative Negative    Ketones, UA Trace (A) Negative    Bilirubin, UA Small (1+) (A) Negative    Blood, UA Negative Negative    Protein, UA Negative Negative    Leuk Esterase, UA Small (1+) (A) Negative    Nitrite, UA Negative Negative    Urobilinogen, UA 0.2 E.U./dL 0.2 - 1.0 E.U./dL   Urine Culture - Urine, Urine, Clean Catch   Result Value Ref Range    Urine Culture No growth at 24 hours    Uric Acid   Result Value Ref Range    Uric Acid 6.7 2.5 - 8.5 mg/dL   Magnesium   Result Value Ref Range    Magnesium 2.2 1.6 - 2.3 mg/dL   Renal Function Panel   Result Value Ref Range    Glucose 116 (H) 60 - 100 mg/dL    BUN 18 7 - 21 mg/dL    Creatinine 1.30 (H) 0.50 - 1.00 mg/dL    Sodium 140 137 - 145 mmol/L    Potassium 4.8 3.5 - 5.1 mmol/L    Chloride 103 95 - 110 mmol/L    CO2 26.0 22.0 - 31.0 mmol/L    Calcium 9.2 8.4 - 10.2 mg/dL    Albumin 4.30 3.40 - 4.80 g/dL    Phosphorus 2.8 2.4 - 4.4 mg/dL    Anion Gap 11.0 5.0 - 15.0 mmol/L    BUN/Creatinine Ratio 13.8 7.0 - 25.0    eGFR Non  Amer 41 (L) 45 - 104 mL/min/1.73   Results for orders placed or performed in visit on 07/09/18   Hemoglobin & Hematocrit, Blood   Result Value Ref Range    Hemoglobin 15.1 12.0 - 15.5 g/dL    Hematocrit 46.5 (H) 35.0 - 45.0 %   Vitamin D 25 Hydroxy   Result Value Ref Range    25 Hydroxy, Vitamin D 28.9 (L) 30.0 - 100.0 ng/ml   Renal Function Panel   Result Value Ref Range    Glucose 93 60 - 100 mg/dL    BUN 23 (H) 7 - 21 mg/dL    Creatinine 1.43 (H) 0.50 - 1.00 mg/dL    Sodium 137 137 - 145 mmol/L    Potassium 4.9 3.5 - 5.1 mmol/L    Chloride 99 95 - 110 mmol/L    CO2 30.0 22.0 - 31.0 mmol/L    Calcium 9.1 8.4 - 10.2 mg/dL    Albumin 4.30 3.40 - 4.80 g/dL    Phosphorus 3.6 2.4 - 4.4 mg/dL    Anion Gap 8.0 5.0 - 15.0 mmol/L     BUN/Creatinine Ratio 16.1 7.0 - 25.0    eGFR Non  Amer 36 (L) 45 - 104 mL/min/1.73   Results for orders placed or performed during the hospital encounter of 04/09/17   Gold Top - SST   Result Value Ref Range    Extra Tube Hold for add-ons.    Green Top (Gel)   Result Value Ref Range    Extra Tube Hold for add-ons.    Urinalysis With / Microscopic If Indicated   Result Value Ref Range    Color, UA Yellow Yellow, Straw, Dark Yellow, Alyce    Appearance, UA Clear Clear    pH, UA <=5.0 5.0 - 9.0    Specific Gravity, UA 1.022 1.003 - 1.030    Glucose, UA Negative Negative    Ketones, UA Negative Negative    Bilirubin, UA Negative Negative    Blood, UA Negative Negative    Protein, UA Negative Negative    Leuk Esterase, UA Negative Negative    Nitrite, UA Negative Negative    Urobilinogen, UA 0.2 E.U./dL 0.2 - 1.0 E.U./dL   CK-MB   Result Value Ref Range    CKMB 0.24 0.00 - 5.00 ng/mL   CK-MB   Result Value Ref Range    CKMB 0.37 0.00 - 5.00 ng/mL   CBC Auto Differential   Result Value Ref Range    WBC 8.67 3.20 - 9.80 10*3/mm3    RBC 4.16 3.77 - 5.16 10*6/mm3    Hemoglobin 11.3 (L) 12.0 - 15.5 g/dL    Hematocrit 35.0 35.0 - 45.0 %    MCV 84.1 80.0 - 98.0 fL    MCH 27.2 26.5 - 34.0 pg    MCHC 32.3 31.4 - 36.0 g/dL    RDW 15.7 (H) 11.5 - 14.5 %    RDW-SD 48.3 (H) 36.4 - 46.3 fl    MPV 10.3 8.0 - 12.0 fL    Platelets 392 150 - 450 10*3/mm3    Neutrophil % 87.6 (H) 37.0 - 80.0 %    Lymphocyte % 11.6 10.0 - 50.0 %    Monocyte % 0.5 0.0 - 12.0 %    Eosinophil % 0.0 0.0 - 7.0 %    Basophil % 0.1 0.0 - 2.0 %    Immature Grans % 0.2 0.0 - 0.5 %    Neutrophils, Absolute 7.59 2.00 - 8.60 10*3/mm3    Lymphocytes, Absolute 1.01 0.60 - 4.20 10*3/mm3    Monocytes, Absolute 0.04 0.00 - 0.90 10*3/mm3    Eosinophils, Absolute 0.00 0.00 - 0.70 10*3/mm3    Basophils, Absolute 0.01 0.00 - 0.20 10*3/mm3    Immature Grans, Absolute 0.02 0.00 - 0.02 10*3/mm3     *Note: Due to a large number of results and/or encounters for the requested  time period, some results have not been displayed. A complete set of results can be found in Results Review.

## 2020-07-03 PROCEDURE — U0003 INFECTIOUS AGENT DETECTION BY NUCLEIC ACID (DNA OR RNA); SEVERE ACUTE RESPIRATORY SYNDROME CORONAVIRUS 2 (SARS-COV-2) (CORONAVIRUS DISEASE [COVID-19]), AMPLIFIED PROBE TECHNIQUE, MAKING USE OF HIGH THROUGHPUT TECHNOLOGIES AS DESCRIBED BY CMS-2020-01-R: HCPCS | Performed by: INTERNAL MEDICINE

## 2020-07-03 PROCEDURE — C9803 HOPD COVID-19 SPEC COLLECT: HCPCS | Performed by: INTERNAL MEDICINE

## 2020-07-04 LAB
COVID LABCORP PRIORITY: NORMAL
SARS-COV-2 RNA RESP QL NAA+PROBE: NOT DETECTED

## 2020-07-06 ENCOUNTER — HOSPITAL ENCOUNTER (OUTPATIENT)
Facility: HOSPITAL | Age: 70
Setting detail: HOSPITAL OUTPATIENT SURGERY
Discharge: HOME | End: 2020-07-06
Attending: INTERNAL MEDICINE | Admitting: INTERNAL MEDICINE

## 2020-07-06 ENCOUNTER — ANESTHESIA (OUTPATIENT)
Dept: GASTROENTEROLOGY | Facility: HOSPITAL | Age: 70
End: 2020-07-06

## 2020-07-06 ENCOUNTER — ANESTHESIA EVENT (OUTPATIENT)
Dept: GASTROENTEROLOGY | Facility: HOSPITAL | Age: 70
End: 2020-07-06

## 2020-07-06 VITALS
WEIGHT: 250.44 LBS | HEART RATE: 96 BPM | SYSTOLIC BLOOD PRESSURE: 142 MMHG | RESPIRATION RATE: 18 BRPM | TEMPERATURE: 97.6 F | DIASTOLIC BLOOD PRESSURE: 65 MMHG | HEIGHT: 63 IN | BODY MASS INDEX: 44.38 KG/M2 | OXYGEN SATURATION: 96 %

## 2020-07-06 DIAGNOSIS — R10.13 EPIGASTRIC PAIN: ICD-10-CM

## 2020-07-06 DIAGNOSIS — Z87.11 HISTORY OF GASTRIC ULCER: ICD-10-CM

## 2020-07-06 PROCEDURE — 43239 EGD BIOPSY SINGLE/MULTIPLE: CPT | Performed by: INTERNAL MEDICINE

## 2020-07-06 PROCEDURE — 88305 TISSUE EXAM BY PATHOLOGIST: CPT

## 2020-07-06 PROCEDURE — 25010000002 PROPOFOL 10 MG/ML EMULSION: Performed by: NURSE ANESTHETIST, CERTIFIED REGISTERED

## 2020-07-06 RX ORDER — MEPERIDINE HYDROCHLORIDE 25 MG/ML
12.5 INJECTION INTRAMUSCULAR; INTRAVENOUS; SUBCUTANEOUS
Status: DISCONTINUED | OUTPATIENT
Start: 2020-07-06 | End: 2020-07-06 | Stop reason: HOSPADM

## 2020-07-06 RX ORDER — PROMETHAZINE HYDROCHLORIDE 25 MG/1
25 TABLET ORAL ONCE AS NEEDED
Status: DISCONTINUED | OUTPATIENT
Start: 2020-07-06 | End: 2020-07-06 | Stop reason: HOSPADM

## 2020-07-06 RX ORDER — LIDOCAINE HYDROCHLORIDE 20 MG/ML
INJECTION, SOLUTION INTRAVENOUS AS NEEDED
Status: DISCONTINUED | OUTPATIENT
Start: 2020-07-06 | End: 2020-07-06 | Stop reason: SURG

## 2020-07-06 RX ORDER — ONDANSETRON 2 MG/ML
4 INJECTION INTRAMUSCULAR; INTRAVENOUS ONCE AS NEEDED
Status: DISCONTINUED | OUTPATIENT
Start: 2020-07-06 | End: 2020-07-06 | Stop reason: HOSPADM

## 2020-07-06 RX ORDER — PROMETHAZINE HYDROCHLORIDE 25 MG/ML
12.5 INJECTION, SOLUTION INTRAMUSCULAR; INTRAVENOUS ONCE AS NEEDED
Status: DISCONTINUED | OUTPATIENT
Start: 2020-07-06 | End: 2020-07-06 | Stop reason: HOSPADM

## 2020-07-06 RX ORDER — PROPOFOL 10 MG/ML
VIAL (ML) INTRAVENOUS AS NEEDED
Status: DISCONTINUED | OUTPATIENT
Start: 2020-07-06 | End: 2020-07-06 | Stop reason: SURG

## 2020-07-06 RX ORDER — PROMETHAZINE HYDROCHLORIDE 25 MG/1
25 SUPPOSITORY RECTAL ONCE AS NEEDED
Status: DISCONTINUED | OUTPATIENT
Start: 2020-07-06 | End: 2020-07-06 | Stop reason: HOSPADM

## 2020-07-06 RX ORDER — DEXTROSE AND SODIUM CHLORIDE 5; .45 G/100ML; G/100ML
30 INJECTION, SOLUTION INTRAVENOUS CONTINUOUS PRN
Status: DISCONTINUED | OUTPATIENT
Start: 2020-07-06 | End: 2020-07-06 | Stop reason: HOSPADM

## 2020-07-06 RX ADMIN — PROPOFOL 50 MG: 10 INJECTION, EMULSION INTRAVENOUS at 10:18

## 2020-07-06 RX ADMIN — PROPOFOL 50 MG: 10 INJECTION, EMULSION INTRAVENOUS at 10:17

## 2020-07-06 RX ADMIN — DEXTROSE AND SODIUM CHLORIDE 30 ML/HR: 5; 450 INJECTION, SOLUTION INTRAVENOUS at 09:43

## 2020-07-06 RX ADMIN — PROPOFOL 50 MG: 10 INJECTION, EMULSION INTRAVENOUS at 10:20

## 2020-07-06 RX ADMIN — LIDOCAINE HYDROCHLORIDE 50 MG: 20 INJECTION, SOLUTION INTRAVENOUS at 10:17

## 2020-07-06 NOTE — ANESTHESIA PREPROCEDURE EVALUATION
Anesthesia Evaluation     NPO Solid Status: > 8 hours  NPO Liquid Status: > 8 hours           Airway   Mallampati: III  TM distance: >3 FB  Neck ROM: full  No difficulty expected  Dental - normal exam     Pulmonary - normal exam   (+) shortness of breath,   Cardiovascular - normal exam    (+) hypertension,       Neuro/Psych  (+) psychiatric history,     GI/Hepatic/Renal/Endo    (+) obesity, morbid obesity, GERD, PUD,  diabetes mellitus,     Musculoskeletal     Abdominal    Substance History      OB/GYN          Other   arthritis,                      Anesthesia Plan    ASA 3     MAC     intravenous induction     Anesthetic plan, all risks, benefits, and alternatives have been provided, discussed and informed consent has been obtained with: patient.

## 2020-07-06 NOTE — ANESTHESIA POSTPROCEDURE EVALUATION
Patient: Lili Arguello    Procedure Summary     Date:  07/06/20 Room / Location:  [facility] ENDOSCOPY 1 / [facility] ENDOSCOPY    Anesthesia Start:  1015 Anesthesia Stop:  1026    Procedure:  ESOPHAGOGASTRODUODENOSCOPY possible dilation Fri (N/A ) Diagnosis:       History of gastric ulcer      Epigastric pain      (History of gastric ulcer [Z87.19])      (Epigastric pain [R10.13])    Surgeon:  Dameon Benjamin MD Provider:  Lawrence Kramer CRNA    Anesthesia Type:  MAC ASA Status:  3          Anesthesia Type: MAC    Vitals  No vitals data found for the desired time range.          Post Anesthesia Care and Evaluation    Patient location during evaluation: bedside  Patient participation: complete - patient cannot participate  Level of consciousness: awake  Pain score: 0  Pain management: adequate  Airway patency: patent  Anesthetic complications: No anesthetic complications  PONV Status: none  Cardiovascular status: acceptable  Respiratory status: acceptable  Hydration status: acceptable

## 2020-07-08 LAB
LAB AP CASE REPORT: NORMAL
PATH REPORT.FINAL DX SPEC: NORMAL

## 2020-07-13 ENCOUNTER — OFFICE VISIT (OUTPATIENT)
Dept: GASTROENTEROLOGY | Facility: CLINIC | Age: 70
End: 2020-07-13

## 2020-07-13 VITALS
BODY MASS INDEX: 44.23 KG/M2 | SYSTOLIC BLOOD PRESSURE: 127 MMHG | WEIGHT: 249.6 LBS | DIASTOLIC BLOOD PRESSURE: 69 MMHG | HEART RATE: 82 BPM | HEIGHT: 63 IN

## 2020-07-13 DIAGNOSIS — K58.2 IRRITABLE BOWEL SYNDROME WITH BOTH CONSTIPATION AND DIARRHEA: ICD-10-CM

## 2020-07-13 DIAGNOSIS — R10.10 PAIN OF UPPER ABDOMEN: ICD-10-CM

## 2020-07-13 DIAGNOSIS — R11.0 NAUSEA: Primary | ICD-10-CM

## 2020-07-13 PROCEDURE — 99213 OFFICE O/P EST LOW 20 MIN: CPT | Performed by: NURSE PRACTITIONER

## 2020-07-13 RX ORDER — DICYCLOMINE HYDROCHLORIDE 10 MG/1
10 CAPSULE ORAL
Qty: 60 CAPSULE | Refills: 1 | Status: SHIPPED | OUTPATIENT
Start: 2020-07-13 | End: 2020-07-20

## 2020-07-13 NOTE — PATIENT INSTRUCTIONS

## 2020-07-13 NOTE — PROGRESS NOTES
Chief Complaint   Patient presents with   • Abdominal Pain       Subjective    Lili Arguello is a 69 y.o. female. she is here today for follow-up.    69-year-old female here to discuss EGD results along with continual upper abdominal pain and abnormal bowel habits diarrhea alternating with constipation.  Denies any melena or hematochezia.  Has been on PPI daily without full relief in symptoms.  States she always feels very nauseated but does not often vomit.    Abdominal Pain   This is a chronic problem. The onset quality is gradual. The problem occurs intermittently. The problem has been gradually worsening. The pain is located in the epigastric region and periumbilical region. The abdominal pain radiates to the LLQ, RLQ, LUQ and RUQ. Associated symptoms include belching, constipation, diarrhea, flatus and nausea. Pertinent negatives include no anorexia, arthralgias, dysuria, fever, frequency, headaches, hematochezia, hematuria or vomiting. The pain is aggravated by eating. The treatment provided mild relief.     EGD noted gastritis, normal esophagus and normal duodenum.  Antrum biopsy noted no significant pathology.  No H. pylori seen.  Large amount of gastric fluid is noted on EGD pictures       The following portions of the patient's history were reviewed and updated as appropriate:   Past Medical History:   Diagnosis Date   • Chronic gastric ulcer without hemorrhage and without perforation    • Depressive disorder    • Diabetes mellitus (CMS/HCC)    • GERD (gastroesophageal reflux disease)    • Gout    • Hypertension    • Insomnia    • Morbid obesity (CMS/HCC)    • Osteoarthritis of multiple joints      Past Surgical History:   Procedure Laterality Date   • BREAST SURGERY     • CHOLECYSTECTOMY     • ENDOSCOPY     • ENDOSCOPY N/A 7/6/2020    Procedure: ESOPHAGOGASTRODUODENOSCOPY possible dilation Fri;  Surgeon: Dameon Benjamin MD;  Location: [facility] ENDOSCOPY;  Service: Gastroenterology;  Laterality:  N/A;   • GASTRECTOMY     • JOINT REPLACEMENT     • KNEE SURGERY Bilateral 10/02/2014     Family History   Problem Relation Age of Onset   • Lung cancer Other      OB History    None       Prior to Admission medications    Medication Sig Start Date End Date Taking? Authorizing Provider   albuterol (PROVENTIL HFA;VENTOLIN HFA) 108 (90 BASE) MCG/ACT inhaler Inhale 2 puffs Every 4 (Four) Hours As Needed for Wheezing. 4/10/17  Yes Umer Dumont MD   aspirin 81 MG EC tablet Take 1 tablet by mouth Daily. 4/10/17  Yes Umer Dumont MD   carbidopa-levodopa (SINEMET)  MG per tablet Take 1 tablet by mouth 3 (Three) Times a Day.   Yes Emergency, Nurse Epic, RN   cyclobenzaprine (FLEXERIL) 10 MG tablet TAKE ONE TABLET BY MOUTH TWO TIMES DAILY AS NEEDED FOR MUSCLE SPASMS 5/29/20  Yes ProviderLoki MD   gabapentin (NEURONTIN) 300 MG capsule TAKE 1 CAPSULE BY ORAL ROUTE 3 TIMES A DAY 3/24/17  Yes ProviderLoki MD   HYDROcodone-acetaminophen (NORCO) 7.5-325 MG per tablet Take 1 tablet by mouth Every 6 (Six) Hours As Needed for moderate pain (4-6).   Yes ProviderLoki MD   isosorbide mononitrate (IMDUR) 30 MG 24 hr tablet Take 30 mg by mouth Daily.   Yes Emergency, Nurse PATI Arreguin   metoprolol tartrate (LOPRESSOR) 25 MG tablet Take 25 mg by mouth 2 (Two) Times a Day.   Yes Emergency, Nurse PATI Arreguin   ondansetron ODT (ZOFRAN-ODT) 8 MG disintegrating tablet Take 1 tablet by mouth Every 8 (Eight) Hours As Needed for Nausea or Vomiting. 4/7/18  Yes Mariya Phelps APRN   spironolactone (ALDACTONE) 25 MG tablet Take 25 mg by mouth Daily.   Yes Emergency, Nurse Epic, RN   zolpidem (AMBIEN) 10 MG tablet Take 10 mg by mouth At Night As Needed. 3/20/17  Yes ProviderLoki MD     Allergies   Allergen Reactions   • Iodine       iodine:  UNRESPONSIVENESS   • Other      ARTIFICIAL SWEETNER:  Rash: TOPICAL ANTIBACERTIAL AGENTS:  Rash   • Benzoyl Peroxide Rash   • Latex Rash   • Tape Rash     Social  "History     Socioeconomic History   • Marital status:      Spouse name: Not on file   • Number of children: Not on file   • Years of education: Not on file   • Highest education level: Not on file   Tobacco Use   • Smoking status: Former Smoker     Last attempt to quit: 2013     Years since quittin.2   • Smokeless tobacco: Never Used   • Tobacco comment: 30 pack year history   Substance and Sexual Activity   • Alcohol use: No   • Drug use: Yes     Types: Oxycodone     Comment: prescribed norco q6 prn    • Sexual activity: Defer       Review of Systems  Review of Systems   Constitutional: Positive for appetite change (early satiety ) and fatigue. Negative for activity change, chills, diaphoresis, fever and unexpected weight change.   HENT: Negative for sore throat and trouble swallowing.    Respiratory: Negative for shortness of breath.    Gastrointestinal: Positive for abdominal pain (epigastric pain ), constipation, diarrhea, flatus and nausea. Negative for abdominal distention, anal bleeding, anorexia, blood in stool, hematochezia, rectal pain and vomiting.   Genitourinary: Negative for dysuria, frequency and hematuria.   Musculoskeletal: Negative for arthralgias.   Skin: Negative for pallor.   Neurological: Negative for light-headedness and headaches.        /69 (BP Location: Left arm)   Pulse 82   Ht 160 cm (63\")   Wt 113 kg (249 lb 9.6 oz)   BMI 44.21 kg/m²     Objective    Physical Exam   Constitutional: She is oriented to person, place, and time. She appears well-developed and well-nourished. She is cooperative. No distress.   HENT:   Head: Normocephalic and atraumatic.   Neck: Normal range of motion. Neck supple. No thyromegaly present.   Cardiovascular: Normal rate, regular rhythm and normal heart sounds.   Pulmonary/Chest: Effort normal and breath sounds normal. She has no wheezes. She has no rhonchi. She has no rales.   Abdominal: Soft. Normal appearance and bowel sounds are " normal. She exhibits no distension. There is no hepatosplenomegaly. There is tenderness in the right upper quadrant, epigastric area, periumbilical area and left upper quadrant. There is no rigidity and no guarding. No hernia.   Lymphadenopathy:     She has no cervical adenopathy.   Neurological: She is alert and oriented to person, place, and time.   Skin: Skin is warm, dry and intact. No rash noted. No pallor.   Psychiatric: She has a normal mood and affect. Her speech is normal.     Admission on 07/06/2020, Discharged on 07/06/2020   Component Date Value Ref Range Status   • SARS-CoV-2, EUGENE 07/03/2020 Not Detected  Not Detected Final    This test was developed and its performance characteristics determined  by [employer]. This test has not been FDA cleared or  approved. This test has been authorized by FDA under an Emergency Use  Authorization (EUA). This test is only authorized for the duration of  time the declaration that circumstances exist justifying the  authorization of the emergency use of in vitro diagnostic tests for  detection of SARS-CoV-2 virus and/or diagnosis of COVID-19 infection  under section 564(b)(1) of the Act, 21 U.S.C. 360bbb-3(b)(1), unless  the authorization is terminated or revoked sooner.  When diagnostic testing is negative, the possibility of a false  negative result should be considered in the context of a patient's  recent exposures and the presence of clinical signs and symptoms  consistent with COVID-19. An individual without symptoms of COVID-19  and who is not shedding SARS-CoV-2 virus would expect to have a  negative (not detected) result in this assay.   • COVID [facility] PRIORITY 07/03/2020 Comment   Final    Received   • Case Report 07/06/2020    Final                    Value:Surgical Pathology Report                         Case: [id number]                                  Authorizing Provider:  Dameon Benjamin MD        Collected:           07/06/2020 10:22 AM           Ordering Location:     [facility]             Received:            07/06/2020 01:57 PM                                 [facility]                                                     Pathologist:           Dell Shultz MD                                                          Specimen:    Gastric, Antrum                                                                           • Final Diagnosis 07/06/2020    Final                    Value:This result contains rich text formatting which cannot be displayed here.     Assessment/Plan      1. Nausea    2. Pain of upper abdomen    3. Irritable bowel syndrome with both constipation and diarrhea    .   Schedule patient for gastric empty study due to nausea and excessive gastric fluid seen on EGD.  We will add Bentyl for abdominal cramping and irritable bowel symptoms.  Follow-up in 1 month for recheck return office sooner if needed    Orders placed during this encounter include:  Orders Placed This Encounter   Procedures   • NM Gastric Emptying     Standing Status:   Future     Standing Expiration Date:   7/13/2021     Order Specific Question:   Reason for Exam:     Answer:   Nausea       * Surgery not found *    Review and/or summary of lab tests, radiology, procedures, medications. Review and summary of old records and obtaining of history. The risks and benefits of my recommendations, as well as other treatment options were discussed with the patient today. Questions were answered.    New Medications Ordered This Visit   Medications   • dicyclomine (Bentyl) 10 MG capsule     Sig: Take 1 capsule by mouth 4 (Four) Times a Day Before Meals & at Bedtime.     Dispense:  60 capsule     Refill:  1       Follow-up: Return in about 4 weeks (around 8/10/2020) for After test.          This document has been electronically signed by TOBI Gallagher on July 13, 2020 13:37             Results for orders placed or performed during the hospital encounter  of 07/06/20   COVID [facility] Priority - Swab, Nasopharynx   Result Value Ref Range    COVID [facility] PRIORITY Comment    COVID-19,[facility] ROUTINE, NP/OP SWAB IN TRANSPORT MEDIA OR ESWAB 72 HR TAT - Swab, Nasopharynx   Result Value Ref Range    SARS-CoV-2, EUGENE Not Detected Not Detected   Tissue Pathology Exam   Result Value Ref Range    Case Report       Surgical Pathology Report                         Case: [id number]                                  Authorizing Provider:  Dameon Benjamin MD        Collected:           07/06/2020 10:22 AM          Ordering Location:     [facility]             Received:            07/06/2020 01:57 PM                                 [facility]                                                     Pathologist:           Dell Shultz MD                                                          Specimen:    Gastric, Antrum                                                                            Final Diagnosis       See Scanned Report       Results for orders placed or performed in visit on 05/17/19   Hemoglobin & Hematocrit, Blood   Result Value Ref Range    Hemoglobin 14.3 12.0 - 15.9 g/dL    Hematocrit 42.8 34.0 - 46.6 %   Vitamin D 25 Hydroxy   Result Value Ref Range    25 Hydroxy, Vitamin D 12.0 (L) 30.0 - 100.0 ng/ml   Renal Function Panel   Result Value Ref Range    Glucose 115 (H) 65 - 99 mg/dL    BUN 12 8 - 23 mg/dL    Creatinine 1.36 (H) 0.57 - 1.00 mg/dL    Sodium 141 136 - 145 mmol/L    Potassium 4.3 3.5 - 5.2 mmol/L    Chloride 102 98 - 107 mmol/L    CO2 25.8 22.0 - 29.0 mmol/L    Calcium 9.1 8.6 - 10.5 mg/dL    Albumin 4.00 3.50 - 5.20 g/dL    Phosphorus 3.5 2.5 - 4.5 mg/dL    Anion Gap 13.2 mmol/L    BUN/Creatinine Ratio 8.8 7.0 - 25.0    eGFR Non African Amer 39 (L) >60 mL/min/1.73   Results for orders placed or performed during the hospital encounter of 12/22/18   POCT Influenza A/B   Result Value Ref Range    Rapid Influenza A Ag negative Negative     Rapid Influenza B Ag negative Negative    Internal Control Passed Passed    Lot Number [id number]     Expiration Date 9/30/2020    Results for orders placed or performed in visit on 09/13/18   Protein / Creatinine Ratio, Urine - Urine, Clean Catch   Result Value Ref Range    Protein/Creatinine Ratio, Urine 24.8 0.0 - 200.0 mg/G Crea    Creatinine, Urine 278.1 mg/dL    Total Protein, Urine 6.9 mg/dL   Urinalysis without microscopic (no culture) - Urine, Clean Catch   Result Value Ref Range    Color, UA Yellow Yellow, Straw, Dark Yellow, Alyce    Appearance, UA Clear Clear    pH, UA 5.5 5.0 - 9.0    Specific Gravity, UA 1.034 (H) 1.003 - 1.030    Glucose, UA Negative Negative    Ketones, UA Trace (A) Negative    Bilirubin, UA Small (1+) (A) Negative    Blood, UA Negative Negative    Protein, UA Negative Negative    Leuk Esterase, UA Small (1+) (A) Negative    Nitrite, UA Negative Negative    Urobilinogen, UA 0.2 E.U./dL 0.2 - 1.0 E.U./dL   Urine Culture - Urine, Urine, Clean Catch   Result Value Ref Range    Urine Culture No growth at 24 hours    Uric Acid   Result Value Ref Range    Uric Acid 6.7 2.5 - 8.5 mg/dL   Magnesium   Result Value Ref Range    Magnesium 2.2 1.6 - 2.3 mg/dL   Renal Function Panel   Result Value Ref Range    Glucose 116 (H) 60 - 100 mg/dL    BUN 18 7 - 21 mg/dL    Creatinine 1.30 (H) 0.50 - 1.00 mg/dL    Sodium 140 137 - 145 mmol/L    Potassium 4.8 3.5 - 5.1 mmol/L    Chloride 103 95 - 110 mmol/L    CO2 26.0 22.0 - 31.0 mmol/L    Calcium 9.2 8.4 - 10.2 mg/dL    Albumin 4.30 3.40 - 4.80 g/dL    Phosphorus 2.8 2.4 - 4.4 mg/dL    Anion Gap 11.0 5.0 - 15.0 mmol/L    BUN/Creatinine Ratio 13.8 7.0 - 25.0    eGFR Non  Amer 41 (L) 45 - 104 mL/min/1.73   Results for orders placed or performed in visit on 07/09/18   Hemoglobin & Hematocrit, Blood   Result Value Ref Range    Hemoglobin 15.1 12.0 - 15.5 g/dL    Hematocrit 46.5 (H) 35.0 - 45.0 %   Vitamin D 25 Hydroxy   Result Value Ref Range    25  Hydroxy, Vitamin D 28.9 (L) 30.0 - 100.0 ng/ml   Renal Function Panel   Result Value Ref Range    Glucose 93 60 - 100 mg/dL    BUN 23 (H) 7 - 21 mg/dL    Creatinine 1.43 (H) 0.50 - 1.00 mg/dL    Sodium 137 137 - 145 mmol/L    Potassium 4.9 3.5 - 5.1 mmol/L    Chloride 99 95 - 110 mmol/L    CO2 30.0 22.0 - 31.0 mmol/L    Calcium 9.1 8.4 - 10.2 mg/dL    Albumin 4.30 3.40 - 4.80 g/dL    Phosphorus 3.6 2.4 - 4.4 mg/dL    Anion Gap 8.0 5.0 - 15.0 mmol/L    BUN/Creatinine Ratio 16.1 7.0 - 25.0    eGFR Non  Amer 36 (L) 45 - 104 mL/min/1.73   Results for orders placed or performed during the hospital encounter of 04/09/17   Gold Top - SST   Result Value Ref Range    Extra Tube Hold for add-ons.    Green Top (Gel)   Result Value Ref Range    Extra Tube Hold for add-ons.    Urinalysis With / Microscopic If Indicated   Result Value Ref Range    Color, UA Yellow Yellow, Straw, Dark Yellow, Alyce    Appearance, UA Clear Clear    pH, UA <=5.0 5.0 - 9.0    Specific Gravity, UA 1.022 1.003 - 1.030    Glucose, UA Negative Negative    Ketones, UA Negative Negative    Bilirubin, UA Negative Negative    Blood, UA Negative Negative    Protein, UA Negative Negative    Leuk Esterase, UA Negative Negative    Nitrite, UA Negative Negative    Urobilinogen, UA 0.2 E.U./dL 0.2 - 1.0 E.U./dL   CK-MB   Result Value Ref Range    CKMB 0.24 0.00 - 5.00 ng/mL   CK-MB   Result Value Ref Range    CKMB 0.37 0.00 - 5.00 ng/mL   CBC Auto Differential   Result Value Ref Range    WBC 8.67 3.20 - 9.80 10*3/mm3    RBC 4.16 3.77 - 5.16 10*6/mm3    Hemoglobin 11.3 (L) 12.0 - 15.5 g/dL    Hematocrit 35.0 35.0 - 45.0 %    MCV 84.1 80.0 - 98.0 fL    MCH 27.2 26.5 - 34.0 pg    MCHC 32.3 31.4 - 36.0 g/dL    RDW 15.7 (H) 11.5 - 14.5 %    RDW-SD 48.3 (H) 36.4 - 46.3 fl    MPV 10.3 8.0 - 12.0 fL    Platelets 392 150 - 450 10*3/mm3    Neutrophil % 87.6 (H) 37.0 - 80.0 %    Lymphocyte % 11.6 10.0 - 50.0 %    Monocyte % 0.5 0.0 - 12.0 %    Eosinophil % 0.0 0.0 -  7.0 %    Basophil % 0.1 0.0 - 2.0 %    Immature Grans % 0.2 0.0 - 0.5 %    Neutrophils, Absolute 7.59 2.00 - 8.60 10*3/mm3    Lymphocytes, Absolute 1.01 0.60 - 4.20 10*3/mm3    Monocytes, Absolute 0.04 0.00 - 0.90 10*3/mm3    Eosinophils, Absolute 0.00 0.00 - 0.70 10*3/mm3    Basophils, Absolute 0.01 0.00 - 0.20 10*3/mm3    Immature Grans, Absolute 0.02 0.00 - 0.02 10*3/mm3     *Note: Due to a large number of results and/or encounters for the requested time period, some results have not been displayed. A complete set of results can be found in Results Review.

## 2020-07-16 ENCOUNTER — HOSPITAL ENCOUNTER (OUTPATIENT)
Dept: NUCLEAR MEDICINE | Facility: HOSPITAL | Age: 70
Discharge: HOME | End: 2020-07-16

## 2020-07-16 DIAGNOSIS — R11.0 NAUSEA: ICD-10-CM

## 2020-07-16 DIAGNOSIS — R10.10 PAIN OF UPPER ABDOMEN: ICD-10-CM

## 2020-07-16 PROCEDURE — 78264 GASTRIC EMPTYING IMG STUDY: CPT

## 2020-07-16 PROCEDURE — 0 TECHNETIUM SULFUR COLLOID: Performed by: NURSE PRACTITIONER

## 2020-07-16 PROCEDURE — A9541 TC99M SULFUR COLLOID: HCPCS | Performed by: NURSE PRACTITIONER

## 2020-07-16 RX ADMIN — TECHNETIUM TC 99M SULFUR COLLOID 1 DOSE: KIT at 07:20

## 2020-07-20 ENCOUNTER — OFFICE VISIT (OUTPATIENT)
Dept: GASTROENTEROLOGY | Facility: CLINIC | Age: 70
End: 2020-07-20

## 2020-07-20 VITALS — HEIGHT: 63 IN | BODY MASS INDEX: 43.73 KG/M2 | WEIGHT: 246.8 LBS

## 2020-07-20 DIAGNOSIS — K58.2 IRRITABLE BOWEL SYNDROME WITH BOTH CONSTIPATION AND DIARRHEA: Primary | ICD-10-CM

## 2020-07-20 DIAGNOSIS — K21.9 GASTROESOPHAGEAL REFLUX DISEASE WITHOUT ESOPHAGITIS: ICD-10-CM

## 2020-07-20 DIAGNOSIS — R11.0 NAUSEA: ICD-10-CM

## 2020-07-20 PROCEDURE — 99213 OFFICE O/P EST LOW 20 MIN: CPT | Performed by: NURSE PRACTITIONER

## 2020-07-20 RX ORDER — DICYCLOMINE HCL 20 MG
20 TABLET ORAL
Qty: 90 TABLET | Refills: 5 | Status: SHIPPED | OUTPATIENT
Start: 2020-07-20 | End: 2020-07-20 | Stop reason: SDUPTHER

## 2020-07-20 RX ORDER — SUCRALFATE 1 G/1
1 TABLET ORAL 4 TIMES DAILY
Qty: 120 TABLET | Refills: 2 | Status: SHIPPED | OUTPATIENT
Start: 2020-07-20

## 2020-07-20 RX ORDER — DICYCLOMINE HCL 20 MG
20 TABLET ORAL
Qty: 90 TABLET | Refills: 5 | Status: SHIPPED | OUTPATIENT
Start: 2020-07-20 | End: 2020-12-02

## 2020-07-20 RX ORDER — PANTOPRAZOLE SODIUM 40 MG/1
40 TABLET, DELAYED RELEASE ORAL
COMMUNITY
Start: 2020-01-16

## 2020-07-20 NOTE — PROGRESS NOTES
Chief Complaint   Patient presents with   • Abdominal Pain       Subjective    Lili Arguello is a 69 y.o. female. she is here today for follow-up.    History of Present Illness  69-year-old female presents for follow-up after gastric emptying study still has some trouble with nausea abdominal pain and fatigue.  Has not had any vomiting recently.  Reports bowel movements always alternate between constipation diarrhea diarrhea has been an issue more recently.  We tried her on Bentyl and could not tell much of a difference feels like she needs to increase the dose.  States she still has some indigestion reflux symptoms despite taking PPI daily.  Only eats about 1 time per day and snacks on chips and things throughout the day.  Gastric empty study was normal no evidence of gastroparesis.       The following portions of the patient's history were reviewed and updated as appropriate:   Past Medical History:   Diagnosis Date   • Chronic gastric ulcer without hemorrhage and without perforation    • Depressive disorder    • Diabetes mellitus (CMS/HCC)    • GERD (gastroesophageal reflux disease)    • Gout    • Hypertension    • Insomnia    • Morbid obesity (CMS/HCC)    • Osteoarthritis of multiple joints      Past Surgical History:   Procedure Laterality Date   • BREAST SURGERY     • CHOLECYSTECTOMY     • ENDOSCOPY     • ENDOSCOPY N/A 7/6/2020    Procedure: ESOPHAGOGASTRODUODENOSCOPY possible dilation Fri;  Surgeon: Dameon Benjamin MD;  Location: [facility] ENDOSCOPY;  Service: Gastroenterology;  Laterality: N/A;   • GASTRECTOMY     • JOINT REPLACEMENT     • KNEE SURGERY Bilateral 10/02/2014     Family History   Problem Relation Age of Onset   • Lung cancer Other      OB History    None       Prior to Admission medications    Medication Sig Start Date End Date Taking? Authorizing Provider   albuterol (PROVENTIL HFA;VENTOLIN HFA) 108 (90 BASE) MCG/ACT inhaler Inhale 2 puffs Every 4 (Four) Hours As Needed for Wheezing.  4/10/17  Yes Umer Dumont MD   aspirin 81 MG EC tablet Take 1 tablet by mouth Daily. 4/10/17  Yes Umer Dumont MD   carbidopa-levodopa (SINEMET)  MG per tablet Take 1 tablet by mouth 3 (Three) Times a Day.   Yes Emergency, Nurse Epic, RN   cyclobenzaprine (FLEXERIL) 10 MG tablet TAKE ONE TABLET BY MOUTH TWO TIMES DAILY AS NEEDED FOR MUSCLE SPASMS 5/29/20  Yes Provider, MD Loki   gabapentin (NEURONTIN) 300 MG capsule TAKE 1 CAPSULE BY ORAL ROUTE 3 TIMES A DAY 3/24/17  Yes Provider, MD Loki   HYDROcodone-acetaminophen (NORCO) 7.5-325 MG per tablet Take 1 tablet by mouth Every 6 (Six) Hours As Needed for moderate pain (4-6).   Yes Provider, MD Loki   isosorbide mononitrate (IMDUR) 30 MG 24 hr tablet Take 30 mg by mouth Daily.   Yes Emergency, Nurse PATI Arreguin   metoprolol tartrate (LOPRESSOR) 25 MG tablet Take 25 mg by mouth 2 (Two) Times a Day.   Yes Emergency, Nurse PATI Arreguin   ondansetron ODT (ZOFRAN-ODT) 8 MG disintegrating tablet Take 1 tablet by mouth Every 8 (Eight) Hours As Needed for Nausea or Vomiting. 4/7/18  Yes Mariya Phelps APRN   pantoprazole (PROTONIX) 40 MG EC tablet Take 40 mg by mouth. 1/16/20  Yes ProviderLoki MD   spironolactone (ALDACTONE) 25 MG tablet Take 25 mg by mouth Daily.   Yes Emergency, Nurse Epic, RN   zolpidem (AMBIEN) 10 MG tablet Take 10 mg by mouth At Night As Needed. 3/20/17  Yes ProviderLoki MD   dicyclomine (Bentyl) 10 MG capsule Take 1 capsule by mouth 4 (Four) Times a Day Before Meals & at Bedtime. 7/13/20 7/20/20 Yes Kim Ramírez APRN     Allergies   Allergen Reactions   • Iodine       iodine:  UNRESPONSIVENESS   • Other      ARTIFICIAL SWEETNER:  Rash: TOPICAL ANTIBACERTIAL AGENTS:  Rash   • Benzoyl Peroxide Rash   • Latex Rash   • Tape Rash     Social History     Socioeconomic History   • Marital status:      Spouse name: Not on file   • Number of children: Not on file   • Years of education: Not on file   •  "Highest education level: Not on file   Tobacco Use   • Smoking status: Former Smoker     Last attempt to quit: 2013     Years since quittin.2   • Smokeless tobacco: Never Used   • Tobacco comment: 30 pack year history   Substance and Sexual Activity   • Alcohol use: No   • Drug use: Yes     Types: Oxycodone     Comment: prescribed norco q6 prn    • Sexual activity: Defer       Review of Systems  Review of Systems   Constitutional: Positive for fatigue. Negative for activity change, appetite change, chills, diaphoresis, fever and unexpected weight change.   HENT: Negative for sore throat and trouble swallowing.    Respiratory: Negative for shortness of breath.    Gastrointestinal: Positive for abdominal pain, constipation and diarrhea. Negative for abdominal distention, anal bleeding, blood in stool, nausea, rectal pain and vomiting.   Musculoskeletal: Negative for arthralgias.   Skin: Negative for pallor.   Neurological: Negative for light-headedness.        Ht 160 cm (63\")   Wt 112 kg (246 lb 12.8 oz)   BMI 43.72 kg/m²     Objective    Physical Exam   Constitutional: She is oriented to person, place, and time. She appears well-developed and well-nourished. She is cooperative. No distress.   HENT:   Head: Normocephalic and atraumatic.   Neck: Normal range of motion. Neck supple. No thyromegaly present.   Cardiovascular: Normal rate, regular rhythm and normal heart sounds.   Pulmonary/Chest: Effort normal and breath sounds normal. She has no wheezes. She has no rhonchi. She has no rales.   Abdominal: Soft. Normal appearance and bowel sounds are normal. She exhibits no distension. There is no hepatosplenomegaly. There is generalized tenderness. There is no rigidity and no guarding. No hernia.   Lymphadenopathy:     She has no cervical adenopathy.   Neurological: She is alert and oriented to person, place, and time.   Skin: Skin is warm, dry and intact. No rash noted. No pallor.   Psychiatric: She has a " normal mood and affect. Her speech is normal.     Admission on 07/06/2020, Discharged on 07/06/2020   Component Date Value Ref Range Status   • SARS-CoV-2, EUGENE 07/03/2020 Not Detected  Not Detected Final    This test was developed and its performance characteristics determined  by [facility]. This test has not been FDA cleared or  approved. This test has been authorized by FDA under an Emergency Use  Authorization (EUA). This test is only authorized for the duration of  time the declaration that circumstances exist justifying the  authorization of the emergency use of in vitro diagnostic tests for  detection of SARS-CoV-2 virus and/or diagnosis of COVID-19 infection  under section 564(b)(1) of the Act, 21 U.S.C. 360bbb-3(b)(1), unless  the authorization is terminated or revoked sooner.  When diagnostic testing is negative, the possibility of a false  negative result should be considered in the context of a patient's  recent exposures and the presence of clinical signs and symptoms  consistent with COVID-19. An individual without symptoms of COVID-19  and who is not shedding SARS-CoV-2 virus would expect to have a  negative (not detected) result in this assay.   • COVID [facility] PRIORITY 07/03/2020 Comment   Final    Received   • Case Report 07/06/2020    Final                    Value:Surgical Pathology Report                         Case: [id number]                                  Authorizing Provider:  Dameon Benjamin MD        Collected:           07/06/2020 10:22 AM          Ordering Location:     [facility]             Received:            07/06/2020 01:57 PM                                 [facility]                                                     Pathologist:           Dell Shultz MD                                                          Specimen:    Gastric, Antrum                                                                           • Final Diagnosis 07/06/2020     Final                    Value:This result contains rich text formatting which cannot be displayed here.     Assessment/Plan      1. Irritable bowel syndrome with both constipation and diarrhea    2. Nausea    3. Gastroesophageal reflux disease without esophagitis    .   Increase Bentyl to 20 mg before meals and bedtime also add Carafate for continued reflux symptoms.  Recommend avoid spicy or greasy foods and follow standard antireflux measures.    Orders placed during this encounter include:  No orders of the defined types were placed in this encounter.      * Surgery not found *    Review and/or summary of lab tests, radiology, procedures, medications. Review and summary of old records and obtaining of history. The risks and benefits of my recommendations, as well as other treatment options were discussed with the patient today. Questions were answered.    New Medications Ordered This Visit   Medications   • sucralfate (Carafate) 1 g tablet     Sig: Take 1 tablet by mouth 4 (Four) Times a Day.     Dispense:  120 tablet     Refill:  2   • dicyclomine (BENTYL) 20 MG tablet     Sig: Take 1 tablet by mouth 4 (Four) Times a Day Before Meals & at Bedtime As Needed (cramping) for up to 135 days.     Dispense:  90 tablet     Refill:  5       Follow-up: Return in about 4 weeks (around 8/17/2020) for Recheck.          This document has been electronically signed by TOBI Gallagher on July 20, 2020 13:50             Results for orders placed or performed during the hospital encounter of 07/06/20   COVID [facility] Priority - Swab, Nasopharynx   Result Value Ref Range    COVID [facility] PRIORITY Comment    COVID-19,[facility] ROUTINE, NP/OP SWAB IN TRANSPORT MEDIA OR ESWAB 72 HR TAT - Swab, Nasopharynx   Result Value Ref Range    SARS-CoV-2, EUGENE Not Detected Not Detected   Tissue Pathology Exam   Result Value Ref Range    Case Report       Surgical Pathology Report                         Case: [id number]                                   Authorizing Provider:  Dameon Benjamin MD        Collected:           07/06/2020 10:22 AM          Ordering Location:     [facility]             Received:            07/06/2020 01:57 PM                                 [facility]                                                     Pathologist:           Dell Shultz MD                                                          Specimen:    Gastric, Antrum                                                                            Final Diagnosis       See Scanned Report       Results for orders placed or performed in visit on 05/17/19   Hemoglobin & Hematocrit, Blood   Result Value Ref Range    Hemoglobin 14.3 12.0 - 15.9 g/dL    Hematocrit 42.8 34.0 - 46.6 %   Vitamin D 25 Hydroxy   Result Value Ref Range    25 Hydroxy, Vitamin D 12.0 (L) 30.0 - 100.0 ng/ml   Renal Function Panel   Result Value Ref Range    Glucose 115 (H) 65 - 99 mg/dL    BUN 12 8 - 23 mg/dL    Creatinine 1.36 (H) 0.57 - 1.00 mg/dL    Sodium 141 136 - 145 mmol/L    Potassium 4.3 3.5 - 5.2 mmol/L    Chloride 102 98 - 107 mmol/L    CO2 25.8 22.0 - 29.0 mmol/L    Calcium 9.1 8.6 - 10.5 mg/dL    Albumin 4.00 3.50 - 5.20 g/dL    Phosphorus 3.5 2.5 - 4.5 mg/dL    Anion Gap 13.2 mmol/L    BUN/Creatinine Ratio 8.8 7.0 - 25.0    eGFR Non African Amer 39 (L) >60 mL/min/1.73   Results for orders placed or performed during the hospital encounter of 12/22/18   POCT Influenza A/B   Result Value Ref Range    Rapid Influenza A Ag negative Negative    Rapid Influenza B Ag negative Negative    Internal Control Passed Passed    Lot Number [id number]     Expiration Date 9/30/2020    Results for orders placed or performed in visit on 09/13/18   Protein / Creatinine Ratio, Urine - Urine, Clean Catch   Result Value Ref Range    Protein/Creatinine Ratio, Urine 24.8 0.0 - 200.0 mg/G Crea    Creatinine, Urine 278.1 mg/dL    Total Protein, Urine 6.9 mg/dL   Urinalysis without microscopic (no  culture) - Urine, Clean Catch   Result Value Ref Range    Color, UA Yellow Yellow, Straw, Dark Yellow, Alyce    Appearance, UA Clear Clear    pH, UA 5.5 5.0 - 9.0    Specific Gravity, UA 1.034 (H) 1.003 - 1.030    Glucose, UA Negative Negative    Ketones, UA Trace (A) Negative    Bilirubin, UA Small (1+) (A) Negative    Blood, UA Negative Negative    Protein, UA Negative Negative    Leuk Esterase, UA Small (1+) (A) Negative    Nitrite, UA Negative Negative    Urobilinogen, UA 0.2 E.U./dL 0.2 - 1.0 E.U./dL   Urine Culture - Urine, Urine, Clean Catch   Result Value Ref Range    Urine Culture No growth at 24 hours    Uric Acid   Result Value Ref Range    Uric Acid 6.7 2.5 - 8.5 mg/dL   Magnesium   Result Value Ref Range    Magnesium 2.2 1.6 - 2.3 mg/dL   Renal Function Panel   Result Value Ref Range    Glucose 116 (H) 60 - 100 mg/dL    BUN 18 7 - 21 mg/dL    Creatinine 1.30 (H) 0.50 - 1.00 mg/dL    Sodium 140 137 - 145 mmol/L    Potassium 4.8 3.5 - 5.1 mmol/L    Chloride 103 95 - 110 mmol/L    CO2 26.0 22.0 - 31.0 mmol/L    Calcium 9.2 8.4 - 10.2 mg/dL    Albumin 4.30 3.40 - 4.80 g/dL    Phosphorus 2.8 2.4 - 4.4 mg/dL    Anion Gap 11.0 5.0 - 15.0 mmol/L    BUN/Creatinine Ratio 13.8 7.0 - 25.0    eGFR Non  Amer 41 (L) 45 - 104 mL/min/1.73   Results for orders placed or performed in visit on 07/09/18   Hemoglobin & Hematocrit, Blood   Result Value Ref Range    Hemoglobin 15.1 12.0 - 15.5 g/dL    Hematocrit 46.5 (H) 35.0 - 45.0 %   Vitamin D 25 Hydroxy   Result Value Ref Range    25 Hydroxy, Vitamin D 28.9 (L) 30.0 - 100.0 ng/ml   Renal Function Panel   Result Value Ref Range    Glucose 93 60 - 100 mg/dL    BUN 23 (H) 7 - 21 mg/dL    Creatinine 1.43 (H) 0.50 - 1.00 mg/dL    Sodium 137 137 - 145 mmol/L    Potassium 4.9 3.5 - 5.1 mmol/L    Chloride 99 95 - 110 mmol/L    CO2 30.0 22.0 - 31.0 mmol/L    Calcium 9.1 8.4 - 10.2 mg/dL    Albumin 4.30 3.40 - 4.80 g/dL    Phosphorus 3.6 2.4 - 4.4 mg/dL    Anion Gap 8.0 5.0 -  15.0 mmol/L    BUN/Creatinine Ratio 16.1 7.0 - 25.0    eGFR Non  Amer 36 (L) 45 - 104 mL/min/1.73   Results for orders placed or performed during the hospital encounter of 04/09/17   Gold Top - SST   Result Value Ref Range    Extra Tube Hold for add-ons.    Green Top (Gel)   Result Value Ref Range    Extra Tube Hold for add-ons.    Urinalysis With / Microscopic If Indicated   Result Value Ref Range    Color, UA Yellow Yellow, Straw, Dark Yellow, Alyce    Appearance, UA Clear Clear    pH, UA <=5.0 5.0 - 9.0    Specific Gravity, UA 1.022 1.003 - 1.030    Glucose, UA Negative Negative    Ketones, UA Negative Negative    Bilirubin, UA Negative Negative    Blood, UA Negative Negative    Protein, UA Negative Negative    Leuk Esterase, UA Negative Negative    Nitrite, UA Negative Negative    Urobilinogen, UA 0.2 E.U./dL 0.2 - 1.0 E.U./dL   CK-MB   Result Value Ref Range    CKMB 0.24 0.00 - 5.00 ng/mL   CK-MB   Result Value Ref Range    CKMB 0.37 0.00 - 5.00 ng/mL   CBC Auto Differential   Result Value Ref Range    WBC 8.67 3.20 - 9.80 10*3/mm3    RBC 4.16 3.77 - 5.16 10*6/mm3    Hemoglobin 11.3 (L) 12.0 - 15.5 g/dL    Hematocrit 35.0 35.0 - 45.0 %    MCV 84.1 80.0 - 98.0 fL    MCH 27.2 26.5 - 34.0 pg    MCHC 32.3 31.4 - 36.0 g/dL    RDW 15.7 (H) 11.5 - 14.5 %    RDW-SD 48.3 (H) 36.4 - 46.3 fl    MPV 10.3 8.0 - 12.0 fL    Platelets 392 150 - 450 10*3/mm3    Neutrophil % 87.6 (H) 37.0 - 80.0 %    Lymphocyte % 11.6 10.0 - 50.0 %    Monocyte % 0.5 0.0 - 12.0 %    Eosinophil % 0.0 0.0 - 7.0 %    Basophil % 0.1 0.0 - 2.0 %    Immature Grans % 0.2 0.0 - 0.5 %    Neutrophils, Absolute 7.59 2.00 - 8.60 10*3/mm3    Lymphocytes, Absolute 1.01 0.60 - 4.20 10*3/mm3    Monocytes, Absolute 0.04 0.00 - 0.90 10*3/mm3    Eosinophils, Absolute 0.00 0.00 - 0.70 10*3/mm3    Basophils, Absolute 0.01 0.00 - 0.20 10*3/mm3    Immature Grans, Absolute 0.02 0.00 - 0.02 10*3/mm3     *Note: Due to a large number of results and/or encounters for  the requested time period, some results have not been displayed. A complete set of results can be found in Results Review.

## 2020-07-20 NOTE — PATIENT INSTRUCTIONS
Abdominal Pain, Adult  Abdominal pain can be caused by many things. Often, abdominal pain is not serious and it gets better with no treatment or by being treated at home. However, sometimes abdominal pain is serious. Your health care provider will do a medical history and a physical exam to try to determine the cause of your abdominal pain.  Follow these instructions at home:  · Take over-the-counter and prescription medicines only as told by your health care provider. Do not take a laxative unless told by your health care provider.  · Drink enough fluid to keep your urine clear or pale yellow.  · Watch your condition for any changes.  · Keep all follow-up visits as told by your health care provider. This is important.  Contact a health care provider if:  · Your abdominal pain changes or gets worse.  · You are not hungry or you lose weight without trying.  · You are constipated or have diarrhea for more than 2-3 days.  · You have pain when you urinate or have a bowel movement.  · Your abdominal pain wakes you up at night.  · Your pain gets worse with meals, after eating, or with certain foods.  · You are throwing up and cannot keep anything down.  · You have a fever.  Get help right away if:  · Your pain does not go away as soon as your health care provider told you to expect.  · You cannot stop throwing up.  · Your pain is only in areas of the abdomen, such as the right side or the left lower portion of the abdomen.  · You have bloody or black stools, or stools that look like tar.  · You have severe pain, cramping, or bloating in your abdomen.  · You have signs of dehydration, such as:  ? Dark urine, very little urine, or no urine.  ? Cracked lips.  ? Dry mouth.  ? Sunken eyes.  ? Sleepiness.  Weakness.  Low-FODMAP Eating Plan    FODMAPs (fermentable oligosaccharides, disaccharides, monosaccharides, and polyols) are sugars that are hard for some people to digest. A low-FODMAP eating plan may help some people who  have bowel (intestinal) diseases to manage their symptoms.  This meal plan can be complicated to follow. Work with a diet and nutrition specialist (dietitian) to make a low-FODMAP eating plan that is right for you. A dietitian can make sure that you get enough nutrition from this diet.  What are tips for following this plan?  Reading food labels  Check labels for hidden FODMAPs such as:  High-fructose syrup.  Honey.  Agave.  Natural fruit flavors.  Onion or garlic powder.  Choose low-FODMAP foods that contain 3-4 grams of fiber per serving.  Check food labels for serving sizes. Eat only one serving at a time to make sure FODMAP levels stay low.  Meal planning  Follow a low-FODMAP eating plan for up to 6 weeks, or as told by your health care provider or dietitian.  To follow the eating plan:  Eliminate high-FODMAP foods from your diet completely.  Gradually reintroduce high-FODMAP foods into your diet one at a time. Most people should wait a few days after introducing one high-FODMAP food before they introduce the next high-FODMAP food. Your dietitian can recommend how quickly you may reintroduce foods.  Keep a daily record of what you eat and drink, and make note of any symptoms that you have after eating.  Review your daily record with a dietitian regularly. Your dietitian can help you identify which foods you can eat and which foods you should avoid.  General tips  Drink enough fluid each day to keep your urine pale yellow.  Avoid processed foods. These often have added sugar and may be high in FODMAPs.  Avoid most dairy products, whole grains, and sweeteners.  Work with a dietitian to make sure you get enough fiber in your diet.  Recommended foods  Grains  Gluten-free grains, such as rice, oats, buckwheat, quinoa, corn, polenta, and millet. Gluten-free pasta, bread, or cereal. Rice noodles. Corn tortillas.  Vegetables  Eggplant, zucchini, cucumber, peppers, green beans, [place] sprouts, bean sprouts, lettuce,  "arugula, kale, Swiss chard, spinach, marlon greens, bok anny, summer squash, potato, and tomato. Limited amounts of corn, carrot, and sweet potato. Green parts of scallions.  Fruits  Bananas, oranges, kacy, limes, blueberries, raspberries, strawberries, grapes, cantaloupe, honeydew melon, kiwi, papaya, passion fruit, and pineapple. Limited amounts of dried cranberries, banana chips, and shredded coconut.  Dairy  Lactose-free milk, yogurt, and kefir. Lactose-free cottage cheese and ice cream. Non-dairy milks, such as almond, coconut, hemp, and rice milk. Yogurts made of non-dairy milks. Limited amounts of goat cheese, brie, mozzarella, parmesan, swiss, and other hard cheeses.  Meats and other protein foods  Unseasoned beef, pork, poultry, or fish. Eggs. Torres. Tofu (firm) and tempeh. Limited amounts of nuts and seeds, such as almonds, walnuts, brazil nuts, pecans, peanuts, pumpkin seeds, shiloh seeds, and sunflower seeds.  Fats and oils  Butter-free spreads. Vegetable oils, such as olive, canola, and sunflower oil.  Seasoning and other foods  Artificial sweeteners with names that do not end in \"ol\" such as aspartame, saccharine, and stevia. Maple syrup, white table sugar, raw sugar, brown sugar, and molasses. Fresh basil, coriander, parsley, rosemary, and thyme.  Beverages  Water and mineral water. Sugar-sweetened soft drinks. Small amounts of orange juice or cranberry juice. Black and green tea. Most dry sydni. Coffee.  This may not be a complete list of low-FODMAP foods. Talk with your dietitian for more information.  Foods to avoid  Grains  Wheat, including kamut, durum, and semolina. Barley and bulgur. Couscous. Wheat-based cereals. Wheat noodles, bread, crackers, and pastries.  Vegetables  Chicory root, artichoke, asparagus, cabbage, snow peas, sugar snap peas, mushrooms, and cauliflower. Onions, garlic, leeks, and the white part of scallions.  Fruits  Fresh, dried, and juiced forms of apple, pear, watermelon, " peach, plum, cherries, apricots, blackberries, boysenberries, figs, nectarines, and aquiles. Avocado.  Dairy  Milk, yogurt, ice cream, and soft cheese. Cream and sour cream. Milk-based sauces. Custard.  Meats and other protein foods  Fried or fatty meat. Sausage. Cashews and pistachios. Soybeans, baked beans, black beans, chickpeas, kidney beans, adithya beans, navy beans, lentils, and split peas.  Seasoning and other foods  Any sugar-free gum or candy. Foods that contain artificial sweeteners such as sorbitol, mannitol, isomalt, or xylitol. Foods that contain honey, high-fructose corn syrup, or agave. Bouillon, vegetable stock, beef stock, and chicken stock. Garlic and onion powder. Condiments made with onion, such as hummus, chutney, pickles, relish, salad dressing, and salsa. Tomato paste.  Beverages  Chicory-based drinks. Coffee substitutes. Chamomile tea. Fennel tea. Sweet or fortified sydni such as port or alex. Diet soft drinks made with isomalt, mannitol, maltitol, sorbitol, or xylitol. Apple, pear, and aquiles juice. Juices with high-fructose corn syrup.  This may not be a complete list of high-FODMAP foods. Talk with your dietitian to discuss what dietary choices are best for you.   Summary  A low-FODMAP eating plan is a short-term diet that eliminates FODMAPs from your diet to help ease symptoms of certain bowel diseases.  The eating plan usually lasts up to 6 weeks. After that, high-FODMAP foods are restarted gradually, one at a time, so you can find out which may be causing symptoms.  A low-FODMAP eating plan can be complicated. It is best to work with a dietitian who has experience with this type of plan.  This information is not intended to replace advice given to you by your health care provider. Make sure you discuss any questions you have with your health care provider.  Document Released: 08/14/2018 Document Revised: 11/30/2018 Document Reviewed: 08/14/2018  Elsevier Patient Education © 2020 [employer]  ?   This information is not intended to replace advice given to you by your health care provider. Make sure you discuss any questions you have with your health care provider.  Document Released: 09/27/2006 Document Revised: 07/07/2017 Document Reviewed: 05/31/2017  [employer] Interactive Patient Education © 2020 [employer]

## 2020-07-21 RX ORDER — DICYCLOMINE HYDROCHLORIDE 10 MG/1
10 CAPSULE ORAL
Qty: 60 CAPSULE | Refills: 1 | OUTPATIENT
Start: 2020-07-21

## 2020-08-14 ENCOUNTER — APPOINTMENT (OUTPATIENT)
Dept: GENERAL RADIOLOGY | Facility: HOSPITAL | Age: 70
End: 2020-08-14

## 2020-08-14 ENCOUNTER — HOSPITAL ENCOUNTER (INPATIENT)
Facility: HOSPITAL | Age: 70
LOS: 4 days | Discharge: HOME HEALTH | End: 2020-08-18
Attending: EMERGENCY MEDICINE | Admitting: INTERNAL MEDICINE

## 2020-08-14 ENCOUNTER — APPOINTMENT (OUTPATIENT)
Dept: CT IMAGING | Facility: HOSPITAL | Age: 70
End: 2020-08-14
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
## 2020-08-14 DIAGNOSIS — Z74.09 IMPAIRED FUNCTIONAL MOBILITY, BALANCE, GAIT, AND ENDURANCE: ICD-10-CM

## 2020-08-14 DIAGNOSIS — R10.13 EPIGASTRIC PAIN: ICD-10-CM

## 2020-08-14 DIAGNOSIS — G62.9 NEUROPATHY: ICD-10-CM

## 2020-08-14 DIAGNOSIS — Z87.11 HISTORY OF GASTRIC ULCER: ICD-10-CM

## 2020-08-14 DIAGNOSIS — J98.8 VIRAL RESPIRATORY ILLNESS: ICD-10-CM

## 2020-08-14 DIAGNOSIS — R47.9 SPEECH DISTURBANCE, UNSPECIFIED TYPE: ICD-10-CM

## 2020-08-14 DIAGNOSIS — R47.01 APHASIA: ICD-10-CM

## 2020-08-14 DIAGNOSIS — N17.9 AKI (ACUTE KIDNEY INJURY) (HCC): Primary | ICD-10-CM

## 2020-08-14 DIAGNOSIS — N39.0 URINARY TRACT INFECTION WITHOUT HEMATURIA, SITE UNSPECIFIED: ICD-10-CM

## 2020-08-14 DIAGNOSIS — I51.7 LVH (LEFT VENTRICULAR HYPERTROPHY): Chronic | ICD-10-CM

## 2020-08-14 DIAGNOSIS — K21.00 GASTROESOPHAGEAL REFLUX DISEASE WITH ESOPHAGITIS: Chronic | ICD-10-CM

## 2020-08-14 DIAGNOSIS — E66.01 MORBID OBESITY (HCC): Chronic | ICD-10-CM

## 2020-08-14 DIAGNOSIS — R60.0 LOWER EXTREMITY EDEMA: ICD-10-CM

## 2020-08-14 DIAGNOSIS — Z74.09 IMPAIRED MOBILITY AND ACTIVITIES OF DAILY LIVING: ICD-10-CM

## 2020-08-14 DIAGNOSIS — R06.09 DYSPNEA ON EXERTION: ICD-10-CM

## 2020-08-14 DIAGNOSIS — R29.6 RECURRENT FALLS: ICD-10-CM

## 2020-08-14 DIAGNOSIS — B97.89 VIRAL RESPIRATORY ILLNESS: ICD-10-CM

## 2020-08-14 DIAGNOSIS — I51.89 DIASTOLIC DYSFUNCTION: Chronic | ICD-10-CM

## 2020-08-14 DIAGNOSIS — G25.81 RESTLESS LEG SYNDROME: ICD-10-CM

## 2020-08-14 DIAGNOSIS — Z78.9 IMPAIRED MOBILITY AND ACTIVITIES OF DAILY LIVING: ICD-10-CM

## 2020-08-14 PROBLEM — R41.82 ALTERED MENTAL STATUS: Status: ACTIVE | Noted: 2020-08-14

## 2020-08-14 LAB
ALBUMIN SERPL-MCNC: 4 G/DL (ref 3.5–5.2)
ALBUMIN/GLOB SERPL: 1.4 G/DL
ALP SERPL-CCNC: 69 U/L (ref 39–117)
ALT SERPL W P-5'-P-CCNC: <5 U/L (ref 1–33)
AMMONIA BLD-SCNC: 19 UMOL/L (ref 11–51)
ANION GAP SERPL CALCULATED.3IONS-SCNC: 14 MMOL/L (ref 5–15)
AST SERPL-CCNC: 10 U/L (ref 1–32)
BACTERIA UR QL AUTO: ABNORMAL /HPF
BASOPHILS # BLD AUTO: 0.06 10*3/MM3 (ref 0–0.2)
BASOPHILS NFR BLD AUTO: 0.8 % (ref 0–1.5)
BILIRUB SERPL-MCNC: 0.4 MG/DL (ref 0–1.2)
BILIRUB UR QL STRIP: ABNORMAL
BUN SERPL-MCNC: 29 MG/DL (ref 8–23)
BUN/CREAT SERPL: 12.7 (ref 7–25)
CALCIUM SPEC-SCNC: 8.7 MG/DL (ref 8.6–10.5)
CHLORIDE SERPL-SCNC: 99 MMOL/L (ref 98–107)
CLARITY UR: CLEAR
CO2 SERPL-SCNC: 22 MMOL/L (ref 22–29)
COLOR UR: ABNORMAL
CREAT SERPL-MCNC: 2.29 MG/DL (ref 0.57–1)
DEPRECATED RDW RBC AUTO: 55.5 FL (ref 37–54)
EOSINOPHIL # BLD AUTO: 0.28 10*3/MM3 (ref 0–0.4)
EOSINOPHIL NFR BLD AUTO: 3.6 % (ref 0.3–6.2)
ERYTHROCYTE [DISTWIDTH] IN BLOOD BY AUTOMATED COUNT: 14.6 % (ref 12.3–15.4)
GFR SERPL CREATININE-BSD FRML MDRD: 21 ML/MIN/1.73
GLOBULIN UR ELPH-MCNC: 2.8 GM/DL
GLUCOSE SERPL-MCNC: 107 MG/DL (ref 65–99)
GLUCOSE UR STRIP-MCNC: NEGATIVE MG/DL
HBA1C MFR BLD: 5.7 % (ref 4.8–5.6)
HCT VFR BLD AUTO: 40.6 % (ref 34–46.6)
HGB BLD-MCNC: 13.6 G/DL (ref 12–15.9)
HGB UR QL STRIP.AUTO: NEGATIVE
HOLD SPECIMEN: NORMAL
HOLD SPECIMEN: NORMAL
HYALINE CASTS UR QL AUTO: ABNORMAL /LPF
IMM GRANULOCYTES # BLD AUTO: 0.02 10*3/MM3 (ref 0–0.05)
IMM GRANULOCYTES NFR BLD AUTO: 0.3 % (ref 0–0.5)
KETONES UR QL STRIP: ABNORMAL
LEUKOCYTE ESTERASE UR QL STRIP.AUTO: ABNORMAL
LYMPHOCYTES # BLD AUTO: 2.8 10*3/MM3 (ref 0.7–3.1)
LYMPHOCYTES NFR BLD AUTO: 36.2 % (ref 19.6–45.3)
MCH RBC QN AUTO: 34.9 PG (ref 26.6–33)
MCHC RBC AUTO-ENTMCNC: 33.5 G/DL (ref 31.5–35.7)
MCV RBC AUTO: 104.1 FL (ref 79–97)
MONOCYTES # BLD AUTO: 0.66 10*3/MM3 (ref 0.1–0.9)
MONOCYTES NFR BLD AUTO: 8.5 % (ref 5–12)
NEUTROPHILS NFR BLD AUTO: 3.92 10*3/MM3 (ref 1.7–7)
NEUTROPHILS NFR BLD AUTO: 50.6 % (ref 42.7–76)
NITRITE UR QL STRIP: POSITIVE
NRBC BLD AUTO-RTO: 0 /100 WBC (ref 0–0.2)
PH UR STRIP.AUTO: <=5 [PH] (ref 5–9)
PLATELET # BLD AUTO: 354 10*3/MM3 (ref 140–450)
PMV BLD AUTO: 9.4 FL (ref 6–12)
POTASSIUM SERPL-SCNC: 5 MMOL/L (ref 3.5–5.2)
PROT SERPL-MCNC: 6.8 G/DL (ref 6–8.5)
PROT UR QL STRIP: NEGATIVE
RBC # BLD AUTO: 3.9 10*6/MM3 (ref 3.77–5.28)
RBC # UR: ABNORMAL /HPF
REF LAB TEST METHOD: ABNORMAL
SODIUM SERPL-SCNC: 135 MMOL/L (ref 136–145)
SP GR UR STRIP: 1.02 (ref 1–1.03)
SQUAMOUS #/AREA URNS HPF: ABNORMAL /HPF
TSH SERPL DL<=0.05 MIU/L-ACNC: 1.98 UIU/ML (ref 0.27–4.2)
UROBILINOGEN UR QL STRIP: ABNORMAL
WBC # BLD AUTO: 7.74 10*3/MM3 (ref 3.4–10.8)
WBC UR QL AUTO: ABNORMAL /HPF
WHOLE BLOOD HOLD SPECIMEN: NORMAL
WHOLE BLOOD HOLD SPECIMEN: NORMAL

## 2020-08-14 PROCEDURE — 99285 EMERGENCY DEPT VISIT HI MDM: CPT

## 2020-08-14 PROCEDURE — 73502 X-RAY EXAM HIP UNI 2-3 VIEWS: CPT

## 2020-08-14 PROCEDURE — 85025 COMPLETE CBC W/AUTO DIFF WBC: CPT | Performed by: EMERGENCY MEDICINE

## 2020-08-14 PROCEDURE — 82140 ASSAY OF AMMONIA: CPT | Performed by: EMERGENCY MEDICINE

## 2020-08-14 PROCEDURE — 70450 CT HEAD/BRAIN W/O DYE: CPT

## 2020-08-14 PROCEDURE — 82746 ASSAY OF FOLIC ACID SERUM: CPT | Performed by: INTERNAL MEDICINE

## 2020-08-14 PROCEDURE — 81001 URINALYSIS AUTO W/SCOPE: CPT | Performed by: EMERGENCY MEDICINE

## 2020-08-14 PROCEDURE — P9612 CATHETERIZE FOR URINE SPEC: HCPCS

## 2020-08-14 PROCEDURE — 25010000002 HEPARIN (PORCINE) PER 1000 UNITS: Performed by: INTERNAL MEDICINE

## 2020-08-14 PROCEDURE — 71045 X-RAY EXAM CHEST 1 VIEW: CPT

## 2020-08-14 PROCEDURE — 83036 HEMOGLOBIN GLYCOSYLATED A1C: CPT | Performed by: STUDENT IN AN ORGANIZED HEALTH CARE EDUCATION/TRAINING PROGRAM

## 2020-08-14 PROCEDURE — 73564 X-RAY EXAM KNEE 4 OR MORE: CPT

## 2020-08-14 PROCEDURE — 73610 X-RAY EXAM OF ANKLE: CPT

## 2020-08-14 PROCEDURE — 25010000002 CEFTRIAXONE: Performed by: EMERGENCY MEDICINE

## 2020-08-14 PROCEDURE — 84443 ASSAY THYROID STIM HORMONE: CPT | Performed by: INTERNAL MEDICINE

## 2020-08-14 PROCEDURE — 99222 1ST HOSP IP/OBS MODERATE 55: CPT | Performed by: STUDENT IN AN ORGANIZED HEALTH CARE EDUCATION/TRAINING PROGRAM

## 2020-08-14 PROCEDURE — 80053 COMPREHEN METABOLIC PANEL: CPT | Performed by: EMERGENCY MEDICINE

## 2020-08-14 RX ORDER — ASPIRIN 81 MG/1
81 TABLET ORAL DAILY
Status: DISCONTINUED | OUTPATIENT
Start: 2020-08-15 | End: 2020-08-18 | Stop reason: HOSPADM

## 2020-08-14 RX ORDER — PANTOPRAZOLE SODIUM 40 MG/1
40 TABLET, DELAYED RELEASE ORAL
Status: DISCONTINUED | OUTPATIENT
Start: 2020-08-15 | End: 2020-08-18 | Stop reason: HOSPADM

## 2020-08-14 RX ORDER — SODIUM CHLORIDE, SODIUM LACTATE, POTASSIUM CHLORIDE, CALCIUM CHLORIDE 600; 310; 30; 20 MG/100ML; MG/100ML; MG/100ML; MG/100ML
100 INJECTION, SOLUTION INTRAVENOUS CONTINUOUS
Status: DISCONTINUED | OUTPATIENT
Start: 2020-08-14 | End: 2020-08-15

## 2020-08-14 RX ORDER — SPIRONOLACTONE 25 MG/1
25 TABLET ORAL DAILY
Status: DISCONTINUED | OUTPATIENT
Start: 2020-08-15 | End: 2020-08-15

## 2020-08-14 RX ORDER — ACETAMINOPHEN 325 MG/1
650 TABLET ORAL EVERY 4 HOURS PRN
Status: DISCONTINUED | OUTPATIENT
Start: 2020-08-14 | End: 2020-08-18 | Stop reason: HOSPADM

## 2020-08-14 RX ORDER — ISOSORBIDE MONONITRATE 30 MG/1
30 TABLET, EXTENDED RELEASE ORAL DAILY
Status: DISCONTINUED | OUTPATIENT
Start: 2020-08-15 | End: 2020-08-18 | Stop reason: HOSPADM

## 2020-08-14 RX ORDER — HEPARIN SODIUM 5000 [USP'U]/ML
5000 INJECTION, SOLUTION INTRAVENOUS; SUBCUTANEOUS EVERY 8 HOURS SCHEDULED
Status: DISCONTINUED | OUTPATIENT
Start: 2020-08-14 | End: 2020-08-18 | Stop reason: HOSPADM

## 2020-08-14 RX ORDER — SODIUM CHLORIDE 9 MG/ML
125 INJECTION, SOLUTION INTRAVENOUS CONTINUOUS
Status: DISCONTINUED | OUTPATIENT
Start: 2020-08-14 | End: 2020-08-14

## 2020-08-14 RX ORDER — NYSTATIN 100000 [USP'U]/G
POWDER TOPICAL EVERY 12 HOURS SCHEDULED
Status: DISCONTINUED | OUTPATIENT
Start: 2020-08-15 | End: 2020-08-18 | Stop reason: HOSPADM

## 2020-08-14 RX ORDER — ALBUTEROL SULFATE 2.5 MG/3ML
2.5 SOLUTION RESPIRATORY (INHALATION) EVERY 4 HOURS PRN
Status: DISCONTINUED | OUTPATIENT
Start: 2020-08-14 | End: 2020-08-18 | Stop reason: HOSPADM

## 2020-08-14 RX ORDER — SUCRALFATE 1 G/1
1 TABLET ORAL 4 TIMES DAILY
Status: DISCONTINUED | OUTPATIENT
Start: 2020-08-14 | End: 2020-08-18 | Stop reason: HOSPADM

## 2020-08-14 RX ORDER — OXYCODONE HYDROCHLORIDE AND ACETAMINOPHEN 5; 325 MG/1; MG/1
1 TABLET ORAL EVERY 4 HOURS PRN
Status: DISCONTINUED | OUTPATIENT
Start: 2020-08-14 | End: 2020-08-17

## 2020-08-14 RX ORDER — SODIUM CHLORIDE 0.9 % (FLUSH) 0.9 %
10 SYRINGE (ML) INJECTION AS NEEDED
Status: DISCONTINUED | OUTPATIENT
Start: 2020-08-14 | End: 2020-08-14

## 2020-08-14 RX ORDER — SODIUM CHLORIDE 0.9 % (FLUSH) 0.9 %
10 SYRINGE (ML) INJECTION EVERY 12 HOURS SCHEDULED
Status: DISCONTINUED | OUTPATIENT
Start: 2020-08-14 | End: 2020-08-18 | Stop reason: HOSPADM

## 2020-08-14 RX ORDER — SODIUM CHLORIDE 0.9 % (FLUSH) 0.9 %
10 SYRINGE (ML) INJECTION AS NEEDED
Status: DISCONTINUED | OUTPATIENT
Start: 2020-08-14 | End: 2020-08-18 | Stop reason: HOSPADM

## 2020-08-14 RX ORDER — ONDANSETRON 4 MG/1
8 TABLET, ORALLY DISINTEGRATING ORAL EVERY 8 HOURS PRN
Status: DISCONTINUED | OUTPATIENT
Start: 2020-08-14 | End: 2020-08-16

## 2020-08-14 RX ADMIN — HEPARIN SODIUM 5000 UNITS: 5000 INJECTION INTRAVENOUS; SUBCUTANEOUS at 22:57

## 2020-08-14 RX ADMIN — SUCRALFATE 1 G: 1 TABLET ORAL at 23:07

## 2020-08-14 RX ADMIN — CEFTRIAXONE 1 G: 1 INJECTION, POWDER, FOR SOLUTION INTRAMUSCULAR; INTRAVENOUS at 22:16

## 2020-08-14 RX ADMIN — SODIUM CHLORIDE 125 ML/HR: 900 INJECTION, SOLUTION INTRAVENOUS at 22:10

## 2020-08-14 RX ADMIN — CARBIDOPA AND LEVODOPA 1 TABLET: 25; 100 TABLET ORAL at 22:57

## 2020-08-14 RX ADMIN — SODIUM CHLORIDE, POTASSIUM CHLORIDE, SODIUM LACTATE AND CALCIUM CHLORIDE 100 ML/HR: 600; 310; 30; 20 INJECTION, SOLUTION INTRAVENOUS at 23:01

## 2020-08-15 ENCOUNTER — APPOINTMENT (OUTPATIENT)
Dept: PULMONOLOGY | Facility: HOSPITAL | Age: 70
End: 2020-08-15

## 2020-08-15 ENCOUNTER — APPOINTMENT (OUTPATIENT)
Dept: MRI IMAGING | Facility: HOSPITAL | Age: 70
End: 2020-08-15

## 2020-08-15 LAB
ANION GAP SERPL CALCULATED.3IONS-SCNC: 14 MMOL/L (ref 5–15)
BUN SERPL-MCNC: 29 MG/DL (ref 8–23)
BUN/CREAT SERPL: 12.6 (ref 7–25)
CALCIUM SPEC-SCNC: 9.2 MG/DL (ref 8.6–10.5)
CHLORIDE SERPL-SCNC: 102 MMOL/L (ref 98–107)
CHOLEST SERPL-MCNC: 236 MG/DL (ref 0–200)
CO2 SERPL-SCNC: 22 MMOL/L (ref 22–29)
CREAT SERPL-MCNC: 2.3 MG/DL (ref 0.57–1)
GFR SERPL CREATININE-BSD FRML MDRD: 21 ML/MIN/1.73
GLUCOSE SERPL-MCNC: 106 MG/DL (ref 65–99)
HDLC SERPL-MCNC: 28 MG/DL (ref 40–60)
LDLC SERPL CALC-MCNC: 147 MG/DL (ref 0–100)
LDLC/HDLC SERPL: 5.26 {RATIO}
POTASSIUM SERPL-SCNC: 5.4 MMOL/L (ref 3.5–5.2)
PTH-INTACT SERPL-MCNC: 61.2 PG/ML (ref 15–65)
SODIUM SERPL-SCNC: 138 MMOL/L (ref 136–145)
TRIGL SERPL-MCNC: 303 MG/DL (ref 0–150)
VLDLC SERPL-MCNC: 60.6 MG/DL

## 2020-08-15 PROCEDURE — 70551 MRI BRAIN STEM W/O DYE: CPT

## 2020-08-15 PROCEDURE — 70547 MR ANGIOGRAPHY NECK W/O DYE: CPT

## 2020-08-15 PROCEDURE — 82607 VITAMIN B-12: CPT | Performed by: INTERNAL MEDICINE

## 2020-08-15 PROCEDURE — 25010000002 HEPARIN (PORCINE) PER 1000 UNITS: Performed by: INTERNAL MEDICINE

## 2020-08-15 PROCEDURE — 84156 ASSAY OF PROTEIN URINE: CPT | Performed by: NURSE PRACTITIONER

## 2020-08-15 PROCEDURE — 36415 COLL VENOUS BLD VENIPUNCTURE: CPT | Performed by: INTERNAL MEDICINE

## 2020-08-15 PROCEDURE — 80061 LIPID PANEL: CPT | Performed by: INTERNAL MEDICINE

## 2020-08-15 PROCEDURE — 83970 ASSAY OF PARATHORMONE: CPT | Performed by: NURSE PRACTITIONER

## 2020-08-15 PROCEDURE — 82306 VITAMIN D 25 HYDROXY: CPT | Performed by: INTERNAL MEDICINE

## 2020-08-15 PROCEDURE — 99233 SBSQ HOSP IP/OBS HIGH 50: CPT | Performed by: STUDENT IN AN ORGANIZED HEALTH CARE EDUCATION/TRAINING PROGRAM

## 2020-08-15 PROCEDURE — 25010000002 CEFTRIAXONE PER 250 MG: Performed by: INTERNAL MEDICINE

## 2020-08-15 PROCEDURE — 80048 BASIC METABOLIC PNL TOTAL CA: CPT | Performed by: INTERNAL MEDICINE

## 2020-08-15 PROCEDURE — 92523 SPEECH SOUND LANG COMPREHEN: CPT | Performed by: SPEECH-LANGUAGE PATHOLOGIST

## 2020-08-15 PROCEDURE — 97166 OT EVAL MOD COMPLEX 45 MIN: CPT

## 2020-08-15 PROCEDURE — 95816 EEG AWAKE AND DROWSY: CPT

## 2020-08-15 PROCEDURE — 82570 ASSAY OF URINE CREATININE: CPT | Performed by: NURSE PRACTITIONER

## 2020-08-15 PROCEDURE — 70544 MR ANGIOGRAPHY HEAD W/O DYE: CPT

## 2020-08-15 PROCEDURE — 97162 PT EVAL MOD COMPLEX 30 MIN: CPT

## 2020-08-15 RX ORDER — SODIUM CHLORIDE 9 MG/ML
100 INJECTION, SOLUTION INTRAVENOUS CONTINUOUS
Status: DISCONTINUED | OUTPATIENT
Start: 2020-08-15 | End: 2020-08-17

## 2020-08-15 RX ADMIN — SUCRALFATE 1 G: 1 TABLET ORAL at 08:23

## 2020-08-15 RX ADMIN — ASPIRIN 81 MG: 81 TABLET, COATED ORAL at 11:02

## 2020-08-15 RX ADMIN — SUCRALFATE 1 G: 1 TABLET ORAL at 13:04

## 2020-08-15 RX ADMIN — SODIUM CHLORIDE 100 ML/HR: 9 INJECTION, SOLUTION INTRAVENOUS at 21:42

## 2020-08-15 RX ADMIN — OXYCODONE HYDROCHLORIDE AND ACETAMINOPHEN 1 TABLET: 5; 325 TABLET ORAL at 13:04

## 2020-08-15 RX ADMIN — NYSTATIN: 100000 POWDER TOPICAL at 08:23

## 2020-08-15 RX ADMIN — SODIUM CHLORIDE, PRESERVATIVE FREE 10 ML: 5 INJECTION INTRAVENOUS at 21:36

## 2020-08-15 RX ADMIN — METOPROLOL TARTRATE 25 MG: 25 TABLET, FILM COATED ORAL at 11:02

## 2020-08-15 RX ADMIN — CARBIDOPA AND LEVODOPA 1 TABLET: 25; 100 TABLET ORAL at 21:35

## 2020-08-15 RX ADMIN — HEPARIN SODIUM 5000 UNITS: 5000 INJECTION INTRAVENOUS; SUBCUTANEOUS at 13:03

## 2020-08-15 RX ADMIN — SODIUM CHLORIDE, PRESERVATIVE FREE 10 ML: 5 INJECTION INTRAVENOUS at 08:23

## 2020-08-15 RX ADMIN — METOPROLOL TARTRATE 25 MG: 25 TABLET, FILM COATED ORAL at 21:35

## 2020-08-15 RX ADMIN — SUCRALFATE 1 G: 1 TABLET ORAL at 21:35

## 2020-08-15 RX ADMIN — PANTOPRAZOLE SODIUM 40 MG: 40 TABLET, DELAYED RELEASE ORAL at 05:03

## 2020-08-15 RX ADMIN — CARBIDOPA AND LEVODOPA 1 TABLET: 25; 100 TABLET ORAL at 08:22

## 2020-08-15 RX ADMIN — SPIRONOLACTONE 25 MG: 25 TABLET ORAL at 08:22

## 2020-08-15 RX ADMIN — NYSTATIN: 100000 POWDER TOPICAL at 05:03

## 2020-08-15 RX ADMIN — SODIUM CHLORIDE, POTASSIUM CHLORIDE, SODIUM LACTATE AND CALCIUM CHLORIDE 100 ML/HR: 600; 310; 30; 20 INJECTION, SOLUTION INTRAVENOUS at 11:21

## 2020-08-15 RX ADMIN — CEFTRIAXONE SODIUM 1 G: 1 INJECTION, POWDER, FOR SOLUTION INTRAMUSCULAR; INTRAVENOUS at 21:35

## 2020-08-15 RX ADMIN — ISOSORBIDE MONONITRATE 30 MG: 30 TABLET, EXTENDED RELEASE ORAL at 08:22

## 2020-08-15 RX ADMIN — SODIUM CHLORIDE 100 ML/HR: 9 INJECTION, SOLUTION INTRAVENOUS at 13:04

## 2020-08-15 RX ADMIN — HEPARIN SODIUM 5000 UNITS: 5000 INJECTION INTRAVENOUS; SUBCUTANEOUS at 05:03

## 2020-08-15 RX ADMIN — HEPARIN SODIUM 5000 UNITS: 5000 INJECTION INTRAVENOUS; SUBCUTANEOUS at 21:36

## 2020-08-15 RX ADMIN — SUCRALFATE 1 G: 1 TABLET ORAL at 17:38

## 2020-08-15 RX ADMIN — NYSTATIN: 100000 POWDER TOPICAL at 21:35

## 2020-08-15 RX ADMIN — CARBIDOPA AND LEVODOPA 1 TABLET: 25; 100 TABLET ORAL at 17:38

## 2020-08-15 NOTE — CONSULTS
Stroke Consult Note    Patient Name: Lili Arguello   MRN: [id number]  Age: 69 y.o.  Sex: female  : 1950    Primary Care Physician: Sandhya Thomas DO  Referring Physician:  Troy Erazo MD    TIME STROKE TEAM CALLED:   EST     TIME PATIENT SEEN:     Handedness: R   Race: W    Chief Complaint/Reason for Consultation:  Altered mental status and difficulty walking.    Subjective .  HPI:     This 69-year-old with history of hypertension, morbid obesity, DM2, chronic low back pain and neuropathy presented with subacute onset of confusion, and falls.  Patient was recently diagnosed with UTI and was discharged on antibiotics.  Since then, patient had been experiencing loss of train of thoughts.  Family noticed that she was tangential during her conversations.  She also experienced 2 falls in the past 24 hours without loss of consciousness.  However, patient does not recall the exact event.    Last Known Normal Date/Time: 2 weeks ago EST     Review of Systems     Constitutional: No fatigue  HENT: Negative for nosebleeds and rhinorrhea.    Eyes: Negative for redness.   Respiratory: Negative for cough.    Gastrointestinal: Negative for anal bleeding.   Endocrine: Negative for polydipsia.   Genitourinary: Negative for enuresis and urgency.   Musculoskeletal: Negative for joint swelling.   Neurological: Negative for tremors.   Psychiatric/Behavioral: Negative for hallucinations.       Temp:  [98.2 °F (36.8 °C)] 98.2 °F (36.8 °C)  Heart Rate:  [73-92] 73  Resp:  [18-20] 20  BP: (122-123)/(63-66) 122/63    GEN: obese, , pleasant, cooperative  Eyes-show anicteric sclera, moist conjunctiva with no lid lag, no redness  Neck-trachea midline.  There is no thyromegaly.  ENMT-oropharynx clear with moist mucous membranes and good dentition.  Skin-no rash, lesions or ulcers.  Cardiovascular exam- + pedal edema, regular rate and rhythm.  CHEST:on nasal canula   Abdomen-no abdominal distention,  rash  Psychiatric exam-alert oriented x3 with intact judgment and insight      NEURO    MENTAL STATUS: AAOx3, memory intact, fund of knowledge appropriate    LANG/SPEECH: Naming and repetition intact, fluent, follows 3-step commands    CRANIAL NERVES:      II: Pupils equal and reactive, no RAPD, no VF deficits, fundus(not done)      III, IV, VI: EOM intact, no gaze preference or deviation, no nystagmus.      V: normal sensation in V1, V2, and V3 segments bilaterally      VII: no asymmetry, no nasolabial fold flattening      VIII: normal hearing to speech      IX, X: normal palatal elevation, no uvular deviation      XI: 5/5 head turn and 5/5 shoulder shrug bilaterally      XII: midline tongue protrusion    MOTOR:  No drift in all extremities.  REFLEXES: Not tested    SENSORY:    Normal to light touch all throughout as per the nurse at the bedside    COORDINATION: Normal finger to nose and heel to shin, no tremor, no dysmetria    STATION: Not assessed due to patient condition    GAIT: Not assessed due to patient condition  Acute Stroke Data    Alteplase (tPA) Inclusion / Exclusion Criteria    Time: 21:20  Person Administering Scale: Oskar Felder MD    Inclusion Criteria  [x]   18 years of age or greater   []   Onset of symptoms < 4.5 hours before beginning treatment (stroke onset = time patient was last seen well or without symptoms).   []   Diagnosis of acute ischemic stroke causing measurable disabling deficit (Complete Hemianopia, Any Aphasia, Visual or Sensory Extinction, Any weakness limiting sustained effort against gravity)   []   Any remaining deficit considered potentially disabling in view of patient and practitioner   Exclusion criteria (Do not proceed with Alteplase if any are checked under exclusion criteria)  [x]   Onset unknown or GREATER than 4.5 hours   []   ICH on CT/MRI   []   CT demonstrates hypodensity representing acute or subacute infarct   []   Significant head trauma or prior stroke in  the previous 3 months   []   Symptoms suggestive of subarachnoid hemorrhage   []   History of un-ruptured intracranial aneurysm GREATER than 10 mm   []   Recent intracranial or intraspinal surgery within the last 3 months   []   Arterial puncture at a non-compressible site in the previous 7 days   []   Active internal bleeding   []   Acute bleeding tendency   []   Platelet count LESS than 100,000 for known hematological diseases such as leukemia, thrombocytopenia or chronic cirrhosis   []   Current use of anticoagulant with INR GREATER than 1.7 or PT GREATER than 15 seconds, aPTT GREATER than 40 seconds   []   Heparin received within 48 hours, resulting in abnormally elevated aPTT GREATER than upper limit of normal   []   Current use of direct thrombin inhibitors or direct factor Xa inhibitors in the past 48 hours   []   Elevated blood pressure refractory to treatment (systolic GREATER than 185 mm/Hg or diastolic  GREATER than 110 mm/Hg   []   Suspected infective endocarditis and aortic arch dissection   []   Current use of therapeutic treatment dose of low-molecular-weight heparin (LMWH) within the previous 24 hours   []   Structural GI malignancy or bleed   Relative exclusion for all patients  []   Only minor non-disabling symptoms   []   Pregnancy   []   Seizure at onset with postictal residual neurological impairments   []   Major surgery or previous trauma within past 14 days   []   History of previous spontaneous ICH, intracranial neoplasm, or AV malformation   []   Postpartum (within previous 14 days)   []   Recent GI or urinary tract hemorrhage (within previous 21 days)   []   Recent acute MI (within previous 3 months)   []   History of un-ruptured intracranial aneurysm LESS than 10 mm   []   History of ruptured intracranial aneurysm   []   Blood glucose LESS than 50 mg/dL (2.7 mmol/L)   []   Dural puncture within the last 7 days   []   Known GREATER than 10 cerebral microbleeds   Additional exclusions for  patients with symptoms onset between 3 and 4.5 hours.  []   Age > 80.   []   On any anticoagulants regardless of INR  >>> Warfarin (Coumadin), Heparin, Enoxaparin (Lovenox), fondaparinux (Arixtra), bivalirudin (Angiomax), Argatroban, dabigatran (Pradaxa), rivaroxaban (Xarelto), or apixaban (Eliquis)   []   Severe stroke (NIHSS > 25).   []   History of BOTH diabetes and previous ischemic stroke.   [x]   The risks and benefits have been discussed with the patient or family related to the administration of IV Alteplase for stroke symptoms.   []   I have discussed and reviewed the patient's case and imaging with the attending prior to IV Alteplase.    Time Alteplase administered       Past Medical History:   Diagnosis Date   • Chronic gastric ulcer without hemorrhage and without perforation    • Depressive disorder    • Diabetes mellitus (CMS/HCC)    • GERD (gastroesophageal reflux disease)    • Gout    • Hypertension    • Insomnia    • Morbid obesity (CMS/HCC)    • Osteoarthritis of multiple joints      Past Surgical History:   Procedure Laterality Date   • BREAST SURGERY     • CHOLECYSTECTOMY     • ENDOSCOPY     • ENDOSCOPY N/A 2020    Procedure: ESOPHAGOGASTRODUODENOSCOPY possible dilation Fri;  Surgeon: Dameon Benjamin MD;  [facility] ENDOSCOPY;  Service: Gastroenterology;  Laterality: N/A;   • GASTRECTOMY     • JOINT REPLACEMENT     • KNEE SURGERY Bilateral 10/02/2014     Family History   Problem Relation Age of Onset   • Lung cancer Other      Social History     Socioeconomic History   • Marital status:      Spouse name: Not on file   • Number of children: Not on file   • Years of education: Not on file   • Highest education level: Not on file   Tobacco Use   • Smoking status: Former Smoker     Last attempt to quit: 2013     Years since quittin.3   • Smokeless tobacco: Never Used   • Tobacco comment: 30 pack year history   Substance and Sexual Activity   • Alcohol use: No   • Drug use:  Yes     Types: Oxycodone     Comment: prescribed norco q6 prn    • Sexual activity: Defer     Allergies   Allergen Reactions   • Iodine       iodine:  UNRESPONSIVENESS   • Other      ARTIFICIAL SWEETNER:  Rash: TOPICAL ANTIBACERTIAL AGENTS:  Rash   • Benzoyl Peroxide Rash   • Latex Rash   • Tape Rash     Prior to Admission medications    Medication Sig Start Date End Date Taking? Authorizing Provider   albuterol (PROVENTIL HFA;VENTOLIN HFA) 108 (90 BASE) MCG/ACT inhaler Inhale 2 puffs Every 4 (Four) Hours As Needed for Wheezing. 4/10/17   Umer Dumont MD   aspirin 81 MG EC tablet Take 1 tablet by mouth Daily. 4/10/17   Umer Dumont MD   carbidopa-levodopa (SINEMET)  MG per tablet Take 1 tablet by mouth 3 (Three) Times a Day.    Emergency, Nurse Epic, RN   cyclobenzaprine (FLEXERIL) 10 MG tablet TAKE ONE TABLET BY MOUTH TWO TIMES DAILY AS NEEDED FOR MUSCLE SPASMS 5/29/20   Loki Chisholm MD   dicyclomine (BENTYL) 20 MG tablet Take 1 tablet by mouth 4 (Four) Times a Day Before Meals & at Bedtime As Needed (cramping) for up to 135 days. 7/20/20 12/2/20  Kim Ramírez APRN   gabapentin (NEURONTIN) 300 MG capsule TAKE 1 CAPSULE BY ORAL ROUTE 3 TIMES A DAY 3/24/17   Loki Chisholm MD   HYDROcodone-acetaminophen (NORCO) 7.5-325 MG per tablet Take 1 tablet by mouth Every 6 (Six) Hours As Needed for moderate pain (4-6).    ProviderLoki MD   isosorbide mononitrate (IMDUR) 30 MG 24 hr tablet Take 30 mg by mouth Daily.    Emergency, Nurse PATI Arreguin   metoprolol tartrate (LOPRESSOR) 25 MG tablet Take 25 mg by mouth 2 (Two) Times a Day.    Emergency, Nurse PATI Arreguin   ondansetron ODT (ZOFRAN-ODT) 8 MG disintegrating tablet Take 1 tablet by mouth Every 8 (Eight) Hours As Needed for Nausea or Vomiting. 4/7/18   Mariya Phelps APRN   pantoprazole (PROTONIX) 40 MG EC tablet Take 40 mg by mouth. 1/16/20   Loki Chisholm MD   spironolactone (ALDACTONE) 25 MG tablet Take 25 mg by mouth  Daily.    Emergency, Nurse Epic, RN   sucralfate (Carafate) 1 g tablet Take 1 tablet by mouth 4 (Four) Times a Day. 20   Kim Ramírez APRN   zolpidem (AMBIEN) 10 MG tablet Take 10 mg by mouth At Night As Needed. 3/20/17   Provider, MD Loki       [facility] Meds:  Scheduled-    Infusions-   sodium chloride 125 mL/hr      PRNs- •  sodium chloride    Functional Status Prior to Current Stroke/Amelia Score: 1    NIH Stroke Scale  Time: 21:20  Person Administering Scale: Oskar Felder MD    1a  Level of consciousness: 0=alert; keenly responsive   1b. LOC questions:  0=Performs both tasks correctly   1c. LOC commands: 0=Performs both tasks correctly   2.  Best Gaze: 0=normal   3.  Visual: 0=No visual loss   4. Facial Palsy: 0=Normal symmetric movement   5a.  Motor left arm: 0=No drift, limb holds 90 (or 45) degrees for full 10 seconds   5b.  Motor right arm: 0=No drift, limb holds 90 (or 45) degrees for full 10 seconds   6a. motor left le=No drift, limb holds 90 (or 45) degrees for full 10 seconds   6b  Motor right le=No drift, limb holds 90 (or 45) degrees for full 10 seconds   7. Limb Ataxia: 0=Absent   8.  Sensory: 0=Normal; no sensory loss   9. Best Language:  0=No aphasia, normal   10. Dysarthria: 0=Normal   11. Extinction and Inattention: 0=No abnormality    Total:   0       Results Reviewed:  I have personally reviewed current lab, radiology, and data and agree with results.    Results for orders placed during the hospital encounter of 17   Adult Transthoracic Echo Complete    Narrative · The study is technically difficult for diagnosis. The quality of the   study is limited due to poor acoustic windows related to patient body   habitus and patient positioning  · Left Ventricle: Left ventricular function is normal. Estimated EF   appears to be in the range of 61 - 65%.  · Left ventricular function is normal.  · Left ventricular wall thickness is consistent with severe concentric    hypertrophy.  · Left ventricular diastolic dysfunction.  · Left ventricular wall thickness is consistent with severe concentric   hypertrophy  · Left ventricular diastolic dysfunction is noted.  · Right ventricular cavity is borderline dilated.  · Right Ventricle: Normal wall thickness, systolic function and septal   motion noted. Right ventricular cavity is borderline dilated  · Mild mitral valve regurgitation is present.  · Calculated right ventricular systolic pressure from tricuspid   regurgitation is 66 mmHg.  · Moderate pulmonary hypertension is present.  · There is no evidence of pericardial effusion.                Assessment/Plan:  69-year-old right-handed white female with history of hypertension, morbid obesity, diabetes type 2, gastric ulcer, GERD presented to the ED with imbalance, gait difficulty and falls.    Diagnostic imaging-I personally reviewed all the labs and imaging  CT head-mild to moderate generalized atrophy  MRI of the brain with and without  MRA of the brain with and without and neck  EEG routine    On exam, patient does not have any focal deficits.    #1 Encephalopathy-this seems to be resolved on my exam.    Recommend infectious work-up/ evaluate for polypharmacy as the cause  Family mentioned that the patient had confusion spells in the past 2 weeks. This could be either due to a recent UTI or amyloid spells.  Recommend MRI brain with and without to evaluate for possible amyloid angiopathy. EEG routine to evaluate for seizure tendencies/epileptiform discharges.    #3 Repeated Falls-patient had 2 falls in the past 24 hours.  With no loss of consciousness.  We will get MRI brain with and without, MRA of the head and neck to rule out any structural pathology/flow-limiting stenosis within the posterior circulation pathology/ hydrocephalus. Neuropathy could be other cause, however exam is limited due to A/V interface of the consult.       #4 Hypertension-normal blood pressure goals  #5  Diabetes type 2 and in her home regimen  #6 Morbid obesity-counseled on healthy diet and weight loss  #7 GERD-continue home regimen            Oskar Felder MD  August 14, 2020  9:20 PM    Verbal consent taken.  Patient agreeable to be seen via telemedicine.    This was an audio and video enabled telemedicine encounter.

## 2020-08-15 NOTE — PLAN OF CARE
Problem: Patient Care Overview  Goal: Plan of Care Review  Outcome: Ongoing (interventions implemented as appropriate)  Flowsheets (Taken 8/15/2020 5886)  Plan of Care Reviewed With: patient  Outcome Summary: OT eval on this date as co-eval with PT.  Pt was CGA for bed mobility and CGA/min A of 2 for toilet transfer.  LE dress with min A on R leg due to ?  R ankle fx.  Pt could benefit from OT services to regain lost function for ADLs and functional mobility.  Rec home with H/H as needed.

## 2020-08-15 NOTE — H&P
[facility] Medicine Admission      Date of Admission: 8/14/2020, 22:28      Primary Care Physician: Sandhya Thomas DO      Chief Complaint: Confusion and recurrent fall    HPI: 69-year-old female with past medical history significant for hypertension, type 2 diabetes mellitus, gastroesophageal reflux disease and morbid obesity who presents to the emergency department with recurrent falls and speech difficulties with confusion.  Per reports patient has not suffered multiple falls in the past 24 hours and family states that she has been increasingly confused with speech that has not been making sense.  There are no reports of loss of consciousness and the patient does not appear to have suffered any trauma to the head.  Patient's speech was described as a disoriented word salad without significant slurring or weakness in the face.  This is a new problem for the patient.  Notably the patient was recently treated for urinary tract infection.  She has no prior history of stroke.    Concurrent Medical History:  has a past medical history of Chronic gastric ulcer without hemorrhage and without perforation, Depressive disorder, Diabetes mellitus (CMS/[facility]), GERD (gastroesophageal reflux disease), Gout, Hypertension, Insomnia, Morbid obesity (CMS/[facility]), and Osteoarthritis of multiple joints.    Past Surgical History:  has a past surgical history that includes Cholecystectomy; Knee surgery (Bilateral, 10/02/2014); Gastrectomy; Breast surgery; Joint replacement; Esophagogastroduodenoscopy; and Esophagogastroduodenoscopy (N/A, 7/6/2020).    Family History: family history includes Lung cancer in an other family member.     Social History:  reports that she quit smoking about 7 years ago. She has never used smokeless tobacco. She reports that she has current or past drug history. Drug: Oxycodone. She reports that she does not drink alcohol.    Allergies:   Allergies   Allergen Reactions      • Iodine       iodine:  UNRESPONSIVENESS   • Other      ARTIFICIAL SWEETNER:  Rash: TOPICAL ANTIBACERTIAL AGENTS:  Rash   • Benzoyl Peroxide Rash   • Latex Rash   • Tape Rash       Medications:   Prior to Admission medications    Medication Sig Start Date End Date Taking? Authorizing Provider   albuterol (PROVENTIL HFA;VENTOLIN HFA) 108 (90 BASE) MCG/ACT inhaler Inhale 2 puffs Every 4 (Four) Hours As Needed for Wheezing. 4/10/17   Umer Dumont MD   aspirin 81 MG EC tablet Take 1 tablet by mouth Daily. 4/10/17   Umer Dumont MD   carbidopa-levodopa (SINEMET)  MG per tablet Take 1 tablet by mouth 3 (Three) Times a Day.    Emergency, Nurse Epic, RN   cyclobenzaprine (FLEXERIL) 10 MG tablet TAKE ONE TABLET BY MOUTH TWO TIMES DAILY AS NEEDED FOR MUSCLE SPASMS 5/29/20   Loki Chisholm MD   dicyclomine (BENTYL) 20 MG tablet Take 1 tablet by mouth 4 (Four) Times a Day Before Meals & at Bedtime As Needed (cramping) for up to 135 days. 7/20/20 12/2/20  Kim Ramírez APRN   gabapentin (NEURONTIN) 300 MG capsule TAKE 1 CAPSULE BY ORAL ROUTE 3 TIMES A DAY 3/24/17   Loki Chisholm MD   HYDROcodone-acetaminophen (NORCO) 7.5-325 MG per tablet Take 1 tablet by mouth Every 6 (Six) Hours As Needed for moderate pain (4-6).    oLki Chisholm MD   isosorbide mononitrate (IMDUR) 30 MG 24 hr tablet Take 30 mg by mouth Daily.    Emergency, Nurse PATI Arreguin   metoprolol tartrate (LOPRESSOR) 25 MG tablet Take 25 mg by mouth 2 (Two) Times a Day.    Emergency, Nurse PATI Arreguin   ondansetron ODT (ZOFRAN-ODT) 8 MG disintegrating tablet Take 1 tablet by mouth Every 8 (Eight) Hours As Needed for Nausea or Vomiting. 4/7/18   Mariya Phelps APRN   pantoprazole (PROTONIX) 40 MG EC tablet Take 40 mg by mouth. 1/16/20   Loki Chisholm MD   spironolactone (ALDACTONE) 25 MG tablet Take 25 mg by mouth Daily.    Emergency, Nurse PATI Arreguin   sucralfate (Carafate) 1 g tablet Take 1 tablet by mouth 4 (Four) Times a  Day. 7/20/20   Kim Ramírez APRN   zolpidem (AMBIEN) 10 MG tablet Take 10 mg by mouth At Night As Needed. 3/20/17   Provider, MD Loki       Review of Systems:  Review of Systems   Constitutional: Negative for chills and fever.   Respiratory: Negative for cough and shortness of breath.    Cardiovascular: Negative for chest pain and palpitations.   Gastrointestinal: Negative for abdominal pain.   Genitourinary: Negative for dysuria and hematuria.   Musculoskeletal: Negative for arthralgias and myalgias.   Neurological: Positive for speech difficulty. Negative for dizziness, syncope, facial asymmetry, weakness and headaches.      Otherwise complete ROS is negative except as mentioned above.    Physical Exam:   Temp:  [98.2 °F (36.8 °C)] 98.2 °F (36.8 °C)  Heart Rate:  [70-92] 70  Resp:  [18-20] 18  BP: (103-123)/(62-66) 103/62  Physical Exam   Constitutional: She is oriented to person, place, and time. She appears well-developed. No distress.   HENT:   Head: Normocephalic and atraumatic.   Mouth/Throat: Oropharynx is clear and moist.   Eyes: Pupils are equal, round, and reactive to light. Conjunctivae and EOM are normal.   Neck: Normal range of motion. Neck supple.   Cardiovascular: Normal rate, regular rhythm and normal heart sounds.   Pulmonary/Chest: Effort normal and breath sounds normal. No respiratory distress. She has no wheezes. She has no rales.   Abdominal: Soft. Bowel sounds are normal. She exhibits no distension. There is no tenderness.   Musculoskeletal: Normal range of motion. She exhibits no tenderness.   Neurological: She is alert and oriented to person, place, and time. No cranial nerve deficit.   Skin: Skin is warm and dry. Capillary refill takes less than 2 seconds. She is not diaphoretic. No erythema.   Psychiatric: She has a normal mood and affect.         Results Reviewed:  I have personally reviewed current lab, radiology, and data and agree with results.  Lab Results (last 24 hours)      Procedure Component Value Units Date/Time    Hemoglobin A1c [[id number]]  (Abnormal) Collected:  08/1949    Specimen:  Blood Updated:  08/14/20 2227     Hemoglobin A1C 5.70 %     Narrative:       Hemoglobin A1C Ranges:    Increased Risk for Diabetes  5.7% to 6.4%  Diabetes                     >= 6.5%  Diabetic Goal                < 7.0%    [place] Draw [[id number]] Collected:  08/1949    Specimen:  Blood Updated:  08/14/20 2145    Narrative:       The following orders were created for panel order [place] Draw.  Procedure                               Abnormality         Status                     ---------                               -----------         ------                     Light Blue Top[[id number]]                                   Final result               Green Top (Gel)[[id number]]                                  Final result               Lavender Top[[id number]]                                     Final result               Gold Top - SST[[id number]]                                   Final result                 Please view results for these tests on the individual orders.    Gold Top - SST [[id number]] Collected:  08/14/20 2044    Specimen:  Blood Updated:  08/14/20 2145     Extra Tube Hold for add-ons.     Comment: Auto resulted.       Comprehensive Metabolic Panel [[id number]]  (Abnormal) Collected:  08/14/20 2044    Specimen:  Blood Updated:  08/14/20 2123     Glucose 107 mg/dL      BUN 29 mg/dL      Creatinine 2.29 mg/dL      Sodium 135 mmol/L      Potassium 5.0 mmol/L      Chloride 99 mmol/L      CO2 22.0 mmol/L      Calcium 8.7 mg/dL      Total Protein 6.8 g/dL      Albumin 4.00 g/dL      ALT (SGPT) <5 U/L      AST (SGOT) 10 U/L      Alkaline Phosphatase 69 U/L      Total Bilirubin 0.4 mg/dL      eGFR Non African Amer 21 mL/min/1.73      Globulin 2.8 gm/dL      A/G Ratio 1.4 g/dL      BUN/Creatinine Ratio 12.7     Anion Gap 14.0 mmol/L     Narrative:       GFR Normal >60  Chronic Kidney  Disease <60  Kidney Failure <15      Light Blue Top [[id number]] Collected:  08/1949    Specimen:  Blood Updated:  08/14/20 2100     Extra Tube hold for add-on     Comment: Auto resulted       Green Top (Gel) [[id number]] Collected:  08/1949    Specimen:  Blood Updated:  08/14/20 2100     Extra Tube Hold for add-ons.     Comment: Auto resulted.       Lavender Top [[id number]] Collected:  08/1949    Specimen:  Blood Updated:  08/14/20 2100     Extra Tube hold for add-on     Comment: Auto resulted       Ammonia [[id number]]  (Normal) Collected:  08/1949    Specimen:  Blood Updated:  08/14/20 2021     Ammonia 19 umol/L     Urinalysis With Microscopic If Indicated (No Culture) - Urine, Catheter In/Out [[id number]]  (Abnormal) Collected:  08/14/20 1941    Specimen:  Urine, Catheter In/Out Updated:  08/14/20 2003     Color, UA [place]     Appearance, UA Clear     pH, UA <=5.0     Specific Gravity, UA 1.019     Glucose, UA Negative     Ketones, UA Trace     Bilirubin, UA Small (1+)     Blood, UA Negative     Protein, UA Negative     Leuk Esterase, UA Small (1+)     Nitrite, UA Positive     Urobilinogen, UA 1.0 E.U./dL    Urinalysis, Microscopic Only - Urine, Catheter In/Out [[id number]]  (Abnormal) Collected:  08/14/20 1941    Specimen:  Urine, Catheter In/Out Updated:  08/14/20 2003     RBC, UA 0-2 /HPF      WBC, UA 0-2 /HPF      Bacteria, UA None Seen /HPF      Squamous Epithelial Cells, UA None Seen /HPF      Hyaline Casts, UA 3-6 /LPF      Methodology Automated Microscopy    CBC & Differential [[id number]] Collected:  08/1949    Specimen:  Blood Updated:  08/14/20 2001    Narrative:       The following orders were created for panel order CBC & Differential.  Procedure                               Abnormality         Status                     ---------                               -----------         ------                     CBC Auto Differential[[id number]]        Abnormal            Final  result                 Please view results for these tests on the individual orders.    CBC Auto Differential [[id number]]  (Abnormal) Collected:  08/1949    Specimen:  Blood Updated:  08/14/20 2001     WBC 7.74 10*3/mm3      RBC 3.90 10*6/mm3      Hemoglobin 13.6 g/dL      Hematocrit 40.6 %      .1 fL      MCH 34.9 pg      MCHC 33.5 g/dL      RDW 14.6 %      RDW-SD 55.5 fl      MPV 9.4 fL      Platelets 354 10*3/mm3      Neutrophil % 50.6 %      Lymphocyte % 36.2 %      Monocyte % 8.5 %      Eosinophil % 3.6 %      Basophil % 0.8 %      Immature Grans % 0.3 %      Neutrophils, Absolute 3.92 10*3/mm3      Lymphocytes, Absolute 2.80 10*3/mm3      Monocytes, Absolute 0.66 10*3/mm3      Eosinophils, Absolute 0.28 10*3/mm3      Basophils, Absolute 0.06 10*3/mm3      Immature Grans, Absolute 0.02 10*3/mm3      nRBC 0.0 /100 WBC         Imaging Results (Last 24 Hours)     Procedure Component Value Units Date/Time    XR Knee 4+ View Right [[id number]] Collected:  08/14/20 2001     Updated:  08/14/20 2100    Narrative:       PROCEDURE: XR KNEE 4+ VW RIGHT    VIEWS: 3    INDICATION: Trauma    COMPARISON: 10/2/2014    FINDINGS:     -Tricomponent total knee prosthesis in standard position with  no radiographic evidence of periprosthetic fracture. No joint  effusion identified.          Impression:       Unremarkable appearance of tricomponent total knee  prosthesis. No evidence of acute osseous abnormality.    Electronically signed by:  Juliette Davis MD  8/14/2020 8:59 PM CDT  Workstation: [id number]    XR Chest 1 View [[id number]] Collected:  08/14/20 1929     Updated:  08/14/20 2059    Narrative:       PROCEDURE: XR CHEST 1 VW    VIEWS:Single    INDICATION: Altered mental status protocol    COMPARISON: CXR: 4/9/2017    FINDINGS:       - lines/tubes: None    - cardiac: Size within normal limits.    - mediastinum: Contour within normal limits.     - lungs: No evidence of a focal air space process,  pulmonary  interstitial edema, nodule(s)/mass. Right hemidiaphragm again  noted to be elevated    - pleura: No evidence of  fluid.      - osseous: Unremarkable for age.      Impression:       No acute abnormality identified      Electronically signed by:  Juliette Davis MD  8/14/2020 8:57 PM CDT  Workstation: [id number]    XR Hip With or Without Pelvis 2 - 3 View Right [[id number]] Collected:  08/14/20 2001     Updated:  08/14/20 2055    Narrative:       PROCEDURE: XR HIP W OR WO PELVIS 2-3 VIEW RIGHT    VIEWS:  3      INDICATION: Pain multiple    COMPARISON: None    FINDINGS:     - fracture: None    - alignment: Within normal limits    - misc: Degenerative changes of both hips, sacroiliac joints      Impression:       No acute osseous abnormality identified.      Note:  if pain or symptoms persist beyond reasonable expectations    and follow-up imaging is anticipated,  cross sectional imaging   (CT and/or MRI) is suggested, as is deemed clinically   appropriate.    Electronically signed by:  Juliette Davis MD  8/14/2020 8:54 PM CDT  Workstation: [id number]    XR Ankle 3+ View Bilateral [[id number]] Collected:  08/14/20 2001     Updated:  08/14/20 2053    Narrative:       PROCEDURE: XR ANKLE 3+ VW BILATERAL    VIEWS:   3  views each    INDICATION: Pain    COMPARISON: None    FINDINGS:   Right ankle    - fracture: Minimal irregularity at the tip of the medial  malleolus, nondisplaced fracture not excluded    - alignment: WNL    - misc calcaneal spurring at the insertion of the plantar  aponeurosis and the Achilles tendon.      Left ankle    - fracture: None    - alignment: WNL    - misc:  Calcaneal spurring at the insertion of the plantar  aponeurosis and the Achilles tendon.          Impression:       Minimal irregularity of the tip of the medial malleolus of the  right ankle, for which a tiny avulsion fracture cannot be  excluded, particularly if there is point tenderness here.  Clinical correlation needed.      (Note:   if pain or symptoms persist beyond reasonable  expectations and follow-up imaging is anticipated,  scintigraphic  or cross sectional imaging (CT and/or MRI) is suggested, as is  deemed clinically appropriate).    Electronically signed by:  Juliette Davis MD  8/14/2020 8:51 PM CDT  Workstation: [id number]    CT Head Without Contrast [[id number]] Collected:  08/14/20 1946     Updated:  08/14/20 2020    Narrative:       PROCEDURE: CT HEAD WO CONTRAST    INDICATION:  Fall, altered mental status    COMPARISON:  None    TECHNIQUE:  CT head, without iv contrast.       This exam was performed according to our departmental  dose-optimization program, which includes automated exposure  control, adjustment of the mA and/or kV according to patient size  and/or use of iterative reconstruction technique.  DLP is 939.7    FINDINGS:    The degree of cerebral atrophy is within normal limits for age.    There is preservation of the gray-white matter differentiation.      No focal parenchymal lesions.    There are mild age related degenerative cortical and deep white  matter changes.    There is no evidence of a hemorrhage or intracranial mass.    There is no midline shift.    The ventricles are normal in size and configuration.    The brain stem, cerebellum, globes, paranasal sinuses, and  osseous structures are within normal limits.        Impression:       No acute intracranial abnormality. Generalized  cerebral atrophy and presumed microangiopathic change    If pain or symptoms persist beyond reasonable expectations, an  MRI examination is suggested as is deemed clinically appropriate.    Electronically signed by:  Juliette Davis MD  8/14/2020 8:19 PM CDT  Workstation: [id number]            Assessment:    Active Hospital Problems    Diagnosis   • **Recurrent falls   • Aphasia   • Altered mental status   • ADRIANNA (acute kidney injury) (CMS/HCC)   • Diabetes mellitus (CMS/HCC)   • Hypertension             Plan:    Concern for receptive  aphasia.  Per reports by patient and family this sounds consistent with a potential receptive aphasia concerning for possible stroke.  Other etiologies include acute metabolic encephalopathy secondary to underlying urinary tract infection, and polypharmacy with medication side effect.  Neurology was consulted at emergency department and recommends MRI brain with and without contrast, MRA brain and neck along with EEG evaluation tomorrow.  Monitor on telemetry overnight with every 4 hour neurochecks.    Recurrent fall.  Most likely underlying etiology is connected to the cause of the patient's acute confusion.  Possibly secondary to urinary tract infection.  Physical therapy and Occupational Therapy consults ordered.  Orthostatic vital signs ordered.  Patient does not demonstrate any focal weakness or significant sensation loss.  Her diabetes appears well controlled.    Acute kidney injury on chronic kidney disease stage III.  IV fluids this evening and repeat basic metabolic panel in the morning.    Concern for acute cystitis.  Urinalysis revealing potential persistent urinary tract infection in the context of the above symptoms we will go ahead and treat with ceftriaxone.  Follow results of urine culture.    Macrocytosis.  Serum folate acid and vitamin B12 ordered for further investigation.      Matthias Veras MD  Hospitalist Service

## 2020-08-15 NOTE — PROGRESS NOTES
Stroke Progress Note       Chief Complaint:  Altered mental status and falls    Subjective    Subjective     Subjective:  Confusion improved,  No acute events overnight      Review of Systems   Constitutional: No fatigue  HENT: Negative for nosebleeds and rhinorrhea.    Eyes: Negative for redness.   Respiratory: Negative for cough.    Gastrointestinal: Negative for anal bleeding.   Endocrine: Negative for polydipsia.   Genitourinary: Negative for enuresis and urgency.   Musculoskeletal: Negative for joint swelling.   Neurological: Negative for tremors.   Psychiatric/Behavioral: Negative for hallucinations.     Objective      Temp:  [98.2 °F (36.8 °C)-98.6 °F (37 °C)] 98.6 °F (37 °C)  Heart Rate:  [69-92] 92  Resp:  [18-20] 18  BP: ()/(52-91) 131/59      GEN: obese, , pleasant, cooperative  Eyes-show anicteric sclera, moist conjunctiva with no lid lag, no redness  Neck-trachea midline.  There is no thyromegaly.  ENMT-oropharynx clear with moist mucous membranes and good dentition.  Skin-no rash, lesions or ulcers.  Cardiovascular exam- + pedal edema, regular rate and rhythm.  CHEST:on nasal canula   Abdomen-no abdominal distention, rash  Psychiatric exam-alert oriented x3 with intact judgment and insight        NEURO     MENTAL STATUS: AAOx3, memory intact, fund of knowledge appropriate     LANG/SPEECH: Naming and repetition intact, fluent, follows 3-step commands     CRANIAL NERVES:       II: Pupils equal and reactive, no RAPD, no VF deficits, fundus(not done)       III, IV, VI: EOM intact, no gaze preference or deviation, no nystagmus.       V: normal sensation in V1, V2, and V3 segments bilaterally       VII: no asymmetry, no nasolabial fold flattening       VIII: normal hearing to speech       IX, X: normal palatal elevation, no uvular deviation       XI: 5/5 head turn and 5/5 shoulder shrug bilaterally       XII: midline tongue protrusion     MOTOR:  No drift in all extremities.  REFLEXES: Not  tested     SENSORY:     Normal to light touch all throughout as per the nurse at the bedside     COORDINATION: Normal finger to nose and heel to shin, no tremor, no dysmetria     STATION: Not assessed due to patient condition     GAIT: Not assessed due to patient condition  Results Review:    I reviewed the patient's new clinical results.    Lab Results (last 24 hours)     Procedure Component Value Units Date/Time    Lipid Panel [[id number]]  (Abnormal) Collected:  08/15/20 0448    Specimen:  Blood Updated:  08/15/20 0705     Total Cholesterol 236 mg/dL      Triglycerides 303 mg/dL      HDL Cholesterol 28 mg/dL      LDL Cholesterol  147 mg/dL      VLDL Cholesterol 60.6 mg/dL      LDL/HDL Ratio 5.26    Narrative:       Cholesterol Reference Ranges  (U.S[place] Department of Health and Human Services ATP III Classifications)    Desirable          <200 mg/dL  Borderline High    200-239 mg/dL  High Risk          >240 mg/dL      Triglyceride Reference Ranges  (U.S. Department of Health and Human Services ATP III Classifications)    Normal           <150 mg/dL  Borderline High  150-199 mg/dL  High             200-499 mg/dL  Very High        >500 mg/dL    HDL Reference Ranges  (U.S. Department of Health and Human Services ATP III Classifcations)    Low     <40 mg/dl (major risk factor for CHD)  High    >60 mg/dl ('negative' risk factor for CHD)        LDL Reference Ranges  (U.S. Department of Health and Human Services ATP III Classifcations)    Optimal          <100 mg/dL  Near Optimal     100-129 mg/dL  Borderline High  130-159 mg/dL  High             160-189 mg/dL  Very High        >189 mg/dL    Basic Metabolic Panel [[id number]]  (Abnormal) Collected:  08/15/20 0448    Specimen:  Blood Updated:  08/15/20 0705     Glucose 106 mg/dL      BUN 29 mg/dL      Creatinine 2.30 mg/dL      Sodium 138 mmol/L      Potassium 5.4 mmol/L      Chloride 102 mmol/L      CO2 22.0 mmol/L      Calcium 9.2 mg/dL      eGFR Non African Amer 21  mL/min/1.73      BUN/Creatinine Ratio 12.6     Anion Gap 14.0 mmol/L     Narrative:       GFR Normal >60  Chronic Kidney Disease <60  Kidney Failure <15      Vitamin B12 [[id number]] Collected:  08/15/20 0448    Specimen:  Blood Updated:  08/15/20 0508    Vitamin D 25 Hydroxy [[id number]] Collected:  08/15/20 0448    Specimen:  Blood Updated:  08/15/20 0508    TSH [[id number]]  (Normal) Collected:  08/14/20 2044    Specimen:  Blood Updated:  08/14/20 2323     TSH 1.980 uIU/mL     Folate [[id number]] Collected:  08/14/20 2044    Specimen:  Blood Updated:  08/14/20 2300    Hemoglobin A1c [[id number]]  (Abnormal) Collected:  08/1949    Specimen:  Blood Updated:  08/14/20 2227     Hemoglobin A1C 5.70 %     Narrative:       Hemoglobin A1C Ranges:    Increased Risk for Diabetes  5.7% to 6.4%  Diabetes                     >= 6.5%  Diabetic Goal                < 7.0%    [place] Draw [[id number]] Collected:  08/1949    Specimen:  Blood Updated:  08/14/20 2145    Narrative:       The following orders were created for panel order [facility] [facility].  Procedure                               Abnormality         Status                     ---------                               -----------         ------                     Light Blue Top[[id number]]                                   Final result               Green Top (Gel)[[id number]]                                  Final result               Lavender Top[[id number]]                                     Final result               Gold Top - SST[[id number]]                                   Final result                 Please view results for these tests on the individual orders.    Gold Top - SST [[id number]] Collected:  08/14/20 2044    Specimen:  Blood Updated:  08/14/20 2145     Extra Tube Hold for add-ons.     Comment: Auto resulted.       Comprehensive Metabolic Panel [[id number]]  (Abnormal) Collected:  08/14/20 2044    Specimen:  Blood Updated:  08/14/20 2123     Glucose 107  mg/dL      BUN 29 mg/dL      Creatinine 2.29 mg/dL      Sodium 135 mmol/L      Potassium 5.0 mmol/L      Chloride 99 mmol/L      CO2 22.0 mmol/L      Calcium 8.7 mg/dL      Total Protein 6.8 g/dL      Albumin 4.00 g/dL      ALT (SGPT) <5 U/L      AST (SGOT) 10 U/L      Alkaline Phosphatase 69 U/L      Total Bilirubin 0.4 mg/dL      eGFR Non African Amer 21 mL/min/1.73      Globulin 2.8 gm/dL      A/G Ratio 1.4 g/dL      BUN/Creatinine Ratio 12.7     Anion Gap 14.0 mmol/L     Narrative:       GFR Normal >60  Chronic Kidney Disease <60  Kidney Failure <15      Light Blue Top [[id number]] Collected:  08/1949    Specimen:  Blood Updated:  08/14/20 2100     Extra Tube hold for add-on     Comment: Auto resulted       Green Top (Gel) [[id number]] Collected:  08/1949    Specimen:  Blood Updated:  08/14/20 2100     Extra Tube Hold for add-ons.     Comment: Auto resulted.       Lavender Top [[id number]] Collected:  08/1949    Specimen:  Blood Updated:  08/14/20 2100     Extra Tube hold for add-on     Comment: Auto resulted       Ammonia [[id number]]  (Normal) Collected:  08/1949    Specimen:  Blood Updated:  08/14/20 2021     Ammonia 19 umol/L     Urinalysis With Microscopic If Indicated (No Culture) - Urine, Catheter In/Out [[id number]]  (Abnormal) Collected:  08/14/20 1941    Specimen:  Urine, Catheter In/Out Updated:  08/14/20 2003     Color, UA [place]     Appearance, UA Clear     pH, UA <=5.0     Specific Gravity, UA 1.019     Glucose, UA Negative     Ketones, UA Trace     Bilirubin, UA Small (1+)     Blood, UA Negative     Protein, UA Negative     Leuk Esterase, UA Small (1+)     Nitrite, UA Positive     Urobilinogen, UA 1.0 E.U./dL    Urinalysis, Microscopic Only - Urine, Catheter In/Out [[id number]]  (Abnormal) Collected:  08/14/20 1941    Specimen:  Urine, Catheter In/Out Updated:  08/14/20 2003     RBC, UA 0-2 /HPF      WBC, UA 0-2 /HPF      Bacteria, UA None Seen /HPF      Squamous Epithelial  Cells, UA None Seen /HPF      Hyaline Casts, UA 3-6 /LPF      Methodology Automated Microscopy    CBC & Differential [[id number]] Collected:  08/1949    Specimen:  Blood Updated:  08/14/20 2001    Narrative:       The following orders were created for panel order CBC & Differential.  Procedure                               Abnormality         Status                     ---------                               -----------         ------                     CBC Auto Differential[[id number]]        Abnormal            Final result                 Please view results for these tests on the individual orders.    CBC Auto Differential [[id number]]  (Abnormal) Collected:  08/1949    Specimen:  Blood Updated:  08/14/20 2001     WBC 7.74 10*3/mm3      RBC 3.90 10*6/mm3      Hemoglobin 13.6 g/dL      Hematocrit 40.6 %      .1 fL      MCH 34.9 pg      MCHC 33.5 g/dL      RDW 14.6 %      RDW-SD 55.5 fl      MPV 9.4 fL      Platelets 354 10*3/mm3      Neutrophil % 50.6 %      Lymphocyte % 36.2 %      Monocyte % 8.5 %      Eosinophil % 3.6 %      Basophil % 0.8 %      Immature Grans % 0.3 %      Neutrophils, Absolute 3.92 10*3/mm3      Lymphocytes, Absolute 2.80 10*3/mm3      Monocytes, Absolute 0.66 10*3/mm3      Eosinophils, Absolute 0.28 10*3/mm3      Basophils, Absolute 0.06 10*3/mm3      Immature Grans, Absolute 0.02 10*3/mm3      nRBC 0.0 /100 WBC         Xr Knee 4+ View Right    Result Date: 8/14/2020  Unremarkable appearance of tricomponent total knee prosthesis. No evidence of acute osseous abnormality. Electronically signed by:  Juliette Davis MD  8/14/2020 8:59 PM CDT Workstation: [id number]    Ct Head Without Contrast    Result Date: 8/14/2020  No acute intracranial abnormality. Generalized cerebral atrophy and presumed microangiopathic change If pain or symptoms persist beyond reasonable expectations, an MRI examination is suggested as is deemed clinically appropriate. Electronically signed by:  Juliette  Susan MCKINNEY  8/14/2020 8:19 PM CDT Workstation: [id number]    Mri Angiogram Head Without Contrast    Result Date: 8/15/2020  Negative MRA of the brain. Electronically signed by:  Juliette Davis MD  8/15/2020 11:40 AM CDT Workstation: [id number]    Xr Chest 1 View    Result Date: 8/14/2020  No acute abnormality identified Electronically signed by:  Juliette Davis MD  8/14/2020 8:57 PM CDT Workstation: [id number]    Xr Ankle 3+ View Bilateral    Result Date: 8/14/2020  Minimal irregularity of the tip of the medial malleolus of the right ankle, for which a tiny avulsion fracture cannot be excluded, particularly if there is point tenderness here. Clinical correlation needed. (Note:  if pain or symptoms persist beyond reasonable expectations and follow-up imaging is anticipated,  scintigraphic or cross sectional imaging (CT and/or MRI) is suggested, as is deemed clinically appropriate). Electronically signed by:  Juliette Davis MD  8/14/2020 8:51 PM CDT Workstation: [id number]    Xr Hip With Or Without Pelvis 2 - 3 View Right    Result Date: 8/14/2020  No acute osseous abnormality identified. Note:  if pain or symptoms persist beyond reasonable expectations and follow-up imaging is anticipated,  cross sectional imaging  (CT and/or MRI) is suggested, as is deemed clinically appropriate. Electronically signed by:  Juliette Davis MD  8/14/2020 8:54 PM CDT Workstation: [id number]    Results for orders placed during the hospital encounter of 04/09/17   Adult Transthoracic Echo Complete    Narrative · The study is technically difficult for diagnosis. The quality of the   study is limited due to poor acoustic windows related to patient body   habitus and patient positioning  · Left Ventricle: Left ventricular function is normal. Estimated EF   appears to be in the range of 61 - 65%.  · Left ventricular function is normal.  · Left ventricular wall thickness is consistent with severe concentric   hypertrophy.  · Left ventricular  diastolic dysfunction.  · Left ventricular wall thickness is consistent with severe concentric   hypertrophy  · Left ventricular diastolic dysfunction is noted.  · Right ventricular cavity is borderline dilated.  · Right Ventricle: Normal wall thickness, systolic function and septal   motion noted. Right ventricular cavity is borderline dilated  · Mild mitral valve regurgitation is present.  · Calculated right ventricular systolic pressure from tricuspid   regurgitation is 66 mmHg.  · Moderate pulmonary hypertension is present.  · There is no evidence of pericardial effusion.                Assessment/Plan     Assessment/Plan:    69-year-old right-handed white female with history of hypertension, morbid obesity, diabetes type 2, gastric ulcer, GERD presented to the ED with imbalance, gait difficulty and falls.     Diagnostic imaging-I personally reviewed all the labs and imaging  CT head-mild to moderate generalized atrophy  MRI of the brain WO- no acute infarct, significant T2 Flair subcortical hyperintensity suggestive of small vessel disease.   MRA of the brain with and without and neck- no flow limiting stenosis.   EEG routine- pending     On exam today, patient  Is close to her baseline in regards to orientation.     #1 Encephalopathy-this seems to be resolved on my exam.  MRI brain does not evidence any acute infarct.  Likely the cause of symptoms, toxic metabolic.        #3 Repeated Falls-patient had 2 falls in the past 24 hours.  With no loss of consciousness.    MRI of the brain and MRA of the head and neck did not evidence any significant structural pathology which would explain her falls.  Most likely her falls are related to confusion. Neuropathy could be other cause, however exam is limited due to A/V interface of the consult.        #4 Hypertension-normal blood pressure goals  #5 Diabetes type 2 and in her home regimen  #6 Morbid obesity-counseled on healthy diet and weight loss  #7 GERD-continue home  regimen  #8 urinary tract infection-continue antibiotics          Neurology will sign off, will follow with EEG results [if he cannot get EEG during the weekend patient can get routine EEG as an outpatient.  Please call us for any questions.    Oskar Felder MD  08/15/20  11:45    This was an audio and video enabled telemedicine encounter.

## 2020-08-15 NOTE — PLAN OF CARE
Problem: Patient Care Overview  Goal: Plan of Care Review  Outcome: Ongoing (interventions implemented as appropriate)  Note:   Noted skin breakdown to Left breast, bilateral groin area and LUE on admit.  A1C acceptable at 5.7, BUN/Cr/K elev, alb wnl and cholesterol/triglycerides elevated. Current wt  249#, BMI 44.1, indicative of morbid obesity. Current diet Regular cardiac, no intakes available. RD following.

## 2020-08-15 NOTE — THERAPY EVALUATION
Acute Care - Physical Therapy Initial Evaluation  [facility]     Patient Name: Lili Arguello  : 1950  MRN: [id number]  Today's Date: 8/15/2020   Onset of Illness/Injury or Date of Surgery: 20     Referring Physician: FELICITAS Long MD      Admit Date: 2020    Visit Dx:     ICD-10-CM ICD-9-CM   1. ADRIANNA (acute kidney injury) (CMS/HCC) N17.9 584.9   2. Urinary tract infection without hematuria, site unspecified N39.0 599.0   3. Speech disturbance, unspecified type R47.9 784.59   4. Fall, initial encounter W19.XXXA E888.9   5. Impaired functional mobility, balance, gait, and endurance Z74.09 V49.89     Patient Active Problem List   Diagnosis   • Dyspnea on exertion   • Hypertension   • GERD (gastroesophageal reflux disease)   • Morbid obesity (CMS/[facility])   • Diabetes mellitus (CMS/[facility])   • LVH (left ventricular hypertrophy)   • Diastolic dysfunction   • Viral respiratory illness   • Gout   • History of gastric ulcer   • Insomnia   • Low back pain   • Lower extremity edema   • Neuropathy   • Restless leg syndrome   • Epigastric pain   • Aphasia   • Recurrent falls   • Altered mental status   • ADRIANNA (acute kidney injury) (CMS/HCC)     Past Medical History:   Diagnosis Date   • Chronic gastric ulcer without hemorrhage and without perforation    • Depressive disorder    • Diabetes mellitus (CMS/HCC)    • GERD (gastroesophageal reflux disease)    • Gout    • Hypertension    • Insomnia    • Morbid obesity (CMS/[facility])    • Osteoarthritis of multiple joints      Past Surgical History:   Procedure Laterality Date   • BREAST SURGERY     • CHOLECYSTECTOMY     • ENDOSCOPY     • ENDOSCOPY N/A 2020    Procedure: ESOPHAGOGASTRODUODENOSCOPY possible dilation Fri;  Surgeon: Dameon Benjamin MD;  Location: [facility] ENDOSCOPY;  Service: Gastroenterology;  Laterality: N/A;   • GASTRECTOMY     • JOINT REPLACEMENT     • KNEE SURGERY Bilateral 10/02/2014        PT ASSESSMENT (last 12 hours)      Physical Therapy  Evaluation     Row Name 08/15/20 [facility]0          PT Evaluation Time/Intention    Subjective Information  complains of;pain  -GB     Document Type  evaluation  -GB     Mode of Treatment  co-treatment;physical therapy;occupational therapy  -GB     Patient Effort  excellent  -     Row Name 08/15/20 [facility]0          General Information    Patient Profile Reviewed?  yes  -GB     Patient Observations  alert;cooperative;agree to therapy  -GB     Prior Level of Function  independent:;min assist:;all household mobility;community mobility;gait;ADL's;home management;cooking;cleaning;driving;shopping  -GB     Equipment Currently Used at Home  cane, straight;walker, rolling  -GB     Existing Precautions/Restrictions  fall  -GB     Equipment Issued to Patient  gait belt  -GB     Risks Reviewed  patient:;LOB;dizziness  -GB     Benefits Reviewed  patient:;improve function;increase independence;increase strength  -     Row Name 08/15/20 [facility]0          Relationship/Environment    Lives With  spouse  -GB     Row Name 08/15/20 [facility]0          Resource/Environmental Concerns    Current Living Arrangements  home/apartment/condo  -[facility] 08/15/20 [facility]          Living Environment    Home Accessibility  wheelchair accessible;tub/shower is not walk in  -[facility] Name 08/15/20 [facility]          Home Main Entrance    Number of Stairs, Main Entrance  none  -[facility] Name 08/15/20 1350          Cognitive Assessment/Intervention- PT/OT    Orientation Status (Cognition)  oriented x 4  -[facility] Name 08/15/20 [facility]          Safety Issues, Functional Mobility    Safety Issues Affecting Function (Mobility)  insight into deficits/self awareness  -[facility] Name 08/15/20 [facility]0          Mobility Assessment/Treatment    Extremity Weight-bearing Status  right lower extremity  -     Right Lower Extremity (Weight-bearing Status)  weight-bearing as tolerated (WBAT) however c/o R ankle pain w/ gait so will need as delma  -[facility] Name 08/15/20 [facility]0           Bed Mobility Assessment/Treatment    Bed Mobility Assessment/Treatment  bed mobility (all) activities  -GB     [place] Level (Bed Mobility)  conditional independence;supervision;contact guard assist  -GB     Bed Mobility, Safety Issues  impaired trunk control for bed mobility difficulty managing scooting on the air mattress of high bed  -GB     Assistive Device (Bed Mobility)  bed rails;draw sheet;head of bed elevated  -GB     Row Name 08/15/20 1350          Gait/Stairs Assessment/Training    [place] Level (Gait)  supervision;contact guard;1 person assist;1 person to manage equipment  -GB     Assistive Device (Gait)  walker, front-wheeled  -GB     Distance in Feet (Gait)  3, 5  -GB     Pattern (Gait)  step-through  -GB     Row Name 08/15/20 1350          General ROM    GENERAL ROM COMMENTS  khalif LE AROM WFL w/out c/o  -GB     Row Name 08/15/20 1350          MMT (Manual Muscle Testing)    General MMT Comments  grossly 4/5 khalif LE hip,knee flx/ext; DF/PF could give resistance but did not stress R ankle due to recent c/o pain and mild edema/bruising at R lateral malleolus  -GB     Row Name 08/15/20 1350          Sensory Assessment/Intervention    Sensory General Assessment  no sensation deficits identified  -GB     Row Name 08/15/20 1350          Pain Assessment    Additional Documentation  Pain Scale: Numbers Pre/Post-Treatment (Group)  -GB     Row Name 08/15/20 1350          Pain Scale: Numbers Pre/Post-Treatment    Pain Scale: Numbers, Pretreatment  3/10  -GB     Pain Location - Side  Bilateral  -GB     Pain Location - Orientation  lower  -GB     Pain Location  extremity  -GB     Pain Intervention(s)  Medication (See MAR)  -GB     Row Name             Wound 08/14/20 2235 Left upper arm MASD (Moisture associated skin damage)    Wound - Properties Group Date first assessed: 08/14/20  -KH Time first assessed: 2235  -KH Present on Hospital Admission: Y  -KH Side: Left  -KH Orientation: upper  -  Location: arm  -KH, arm pit  Primary Wound Type: MASD  -KH    Row Name             Wound Left breast MASD (Moisture associated skin damage)    Wound - Properties Group Side: Left  -KH Location: breast  -KH, under  Primary Wound Type: MASD  -KH    Row Name             Wound 08/14/20 2235 Left groin MASD (Moisture associated skin damage)    Wound - Properties Group Date first assessed: 08/14/20 -KH Time first assessed: 2235 -KH Present on Hospital Admission: Y  -KH Side: Left  -KH Location: groin  -KH Primary Wound Type: MASD  -KH    Row Name             Wound 08/14/20 2235 Right groin MASD (Moisture associated skin damage)    Wound - Properties Group Date first assessed: 08/14/20  -KH Time first assessed: 2235 -KH Present on Hospital Admission: Y  -KH Side: Right  -KH Location: groin  -KH Primary Wound Type: MASD  -KH    Row Name 08/15/20 1350          Plan of Care Review    Outcome Summary  PT eval completed as co eval w/ OT. Pt energetic and willing to get up/work. [place] [facility] [place] 2 , walked about 3ft and 5 ft w/ FWRW & CGA x 2 w/out LOB or c/o. Goal will be indep mobility and gait to enable her to return home to family w/ assist and possible home health if needed.   -     Row Name 08/15/20 1350          Physical Therapy Clinical Impression    Criteria for Skilled Interventions Met (PT Clinical Impression)  yes  -GB     Rehab Potential (PT Clinical Summary)  good, to achieve stated therapy goals  -GB     Care Plan Review (PT)  patient/other agree to care plan  -     Row Name 08/15/20 1350          Vital Signs    Pre Systolic BP Rehab  115  -GB     Pre Treatment Diastolic BP  66  -GB     Intra Systolic BP Rehab  110  -GB     Intra Treatment Diastolic BP  58  -GB     Post Systolic BP Rehab  107  -GB     Post Treatment Diastolic BP  70  -GB     Pretreatment Heart Rate (beats/min)  78  -GB     Intratreatment Heart Rate (beats/min)  79  -GB     Posttreatment Heart Rate (beats/min)  79   -GB     Pre SpO2 (%)  93  -GB     O2 Delivery Pre Treatment  room air  -GB     Post SpO2 (%)  92  -GB     O2 Delivery Post Treatment  room air  -GB     Pre Patient Position  Supine  -GB     Intra Patient Position  Standing  -GB     Post Patient Position  Supine  -GB     Row Name 08/15/20 1352          Physical Therapy Goals    Bed Mobility Goal Selection (PT)  bed mobility, PT goal 1  -GB     Transfer Goal Selection (PT)  transfer, PT goal 1  -GB     Gait Training Goal Selection (PT)  gait training, PT goal 1  -GB     Stairs Goal Selection (PT)  stairs, PT goal 1  -GB     Additional Documentation  Stairs Goal Selection (PT) (Row)  -GB     Row Name 08/15/20 7250          Bed Mobility Goal 1 (PT)    Activity/Assistive Device (Bed Mobility Goal 1, PT)  bed mobility activities, all  -GB     [place] Level/Cues Needed (Bed Mobility Goal 1, PT)  independent  -GB     Time Frame (Bed Mobility Goal 1, PT)  3 days  -GB     Progress/Outcomes (Bed Mobility Goal 1, PT)  goal not met  -GB     Row Name 08/15/20 4010          Transfer Goal 1 (PT)    Activity/Assistive Device (Transfer Goal 1, PT)  bed-to-chair/chair-to-bed  -GB     [place] Level/Cues Needed (Transfer Goal 1, PT)  independent;conditional independence  -GB     Time Frame (Transfer Goal 1, PT)  3 days  -GB     Progress/Outcome (Transfer Goal 1, PT)  continuing progress toward goal;goal not met  -     Row Name 08/15/20 4310          Gait Training Goal 1 (PT)    Activity/Assistive Device (Gait Training Goal 1, PT)  gait (walking locomotion);assistive device use;decrease fall risk  -GB     [place] Level (Gait Training Goal 1, PT)  independent;conditional independence  -GB     Distance (Gait Goal 1, PT)  500 ft or more w/ VSS  -GB     Time Frame (Gait Training Goal 1, PT)  by discharge  -GB     Barriers (Gait Training Goal 1, PT)  ex delma;confusion  -GB     Progress/Outcome (Gait Training Goal 1, PT)  goal not met  -     Row Name 08/15/20 1536           Stairs Goal 1 (PT)    Activity/Assistive Device (Stairs Goal 1, PT)  ascending stairs;descending stairs  -GB     [place] Level/Cues Needed (Stairs Goal 1, PT)  conditional independence;independent  -GB     Number of Stairs (Stairs Goal 1, PT)  1 or more  -GB     Time Frame (Stairs Goal 1, PT)  by discharge  -GB     Progress/Outcome (Stairs Goal 1, PT)  goal not met  -GB     Row Name 08/15/20 9399          Positioning and Restraints    Pre-Treatment Position  in bed  -GB     Post Treatment Position  bed  -GB     In Bed  supine;call light within reach;encouraged to call for assist;exit alarm on;patient within staff view;side rails up x1;side rails up x3  -GB       User Key  (r) = Recorded By, (t) = Taken By, (c) = Cosigned By    Initials Name Provider Type    Any Mckeon, PT Physical Therapist    Magi Wisdom, RN Registered Nurse        Physical Therapy Education                 Title: PT OT SLP Therapies (In Progress)     Topic: Physical Therapy (In Progress)     Point: Mobility training (In Progress)     Description:   Instruct learner(s) on safety and technique for assisting patient out of bed, chair or wheelchair.  Instruct in the proper use of assistive devices, such as walker, crutches, cane or brace.              Patient Friendly Description:   It's important to get you on your feet again, but we need to do so in a way that is safe for you. Falling has serious consequences, and your personal safety is the most important thing of all.        When it's time to get out of bed, one of us or a family member will sit next to you on the bed to give you support.     If your doctor or nurse tells you to use a walker, crutches, a cane, or a brace, be sure you use it every time you get out of bed, even if you think you don't need it.    Learning Progress Summary           Patient Acceptance, D,E, NR by OZ at 8/15/2020 3025    Comment:  POC; mobilty                   Point: Home exercise program  (In Progress)     Description:   Instruct learner(s) on appropriate technique for monitoring, assisting and/or progressing patient with therapeutic exercises and activities.              Learning Progress Summary           Patient Acceptance, D,E, NR by  at 8/15/2020 1635    Comment:  POC; mobilty                   Point: Body mechanics (In Progress)     Description:   Instruct learner(s) on proper positioning and spine alignment for patient and/or caregiver during mobility tasks and/or exercises.              Learning Progress Summary           Patient Acceptance, D,E, NR by  at 8/15/2020 1635    Comment:  POC; mobilty                   Point: Precautions (In Progress)     Description:   Instruct learner(s) on prescribed precautions during mobility and gait tasks              Learning Progress Summary           Patient Acceptance, D,E, NR by  at 8/15/2020 1635    Comment:  POC; mobilty                               User Key     Initials Effective Dates Name Provider Type Discipline     04/03/18 -  Any Dow, PT Physical Therapist PT              PT Recommendation and Plan  Anticipated Discharge Disposition (PT): home with home health, home with 24/7 care  Planned Therapy Interventions (PT Eval): balance training, bed mobility training, gait training, home exercise program, motor coordination training, patient/family education, stair training, strengthening, transfer training  Therapy Frequency (PT Clinical Impression): other (see comments)(6 d/ wk)  Outcome Summary/Treatment Plan (PT)  Anticipated Discharge Disposition (PT): home with home health, home with 24/7 care  Outcome Summary: PT eval completed as co eval w/ OT. Pt energetic and willing to get up/work. She transfered to/from [facility] w/ min-CGA of 2 , walked about 3ft and 5 ft w/ FWRW & CGA x 2 w/out LOB or c/o. Goal will be indep mobility and gait to enable her to return home to family w/ assist and possible home health if needed.    Outcome Measures     Row Name 08/15/20 1600             How much help from another person do you currently need...    Turning from your back to your side while in flat bed without using bedrails?  3  -GB      Moving from lying on back to sitting on the side of a flat bed without bedrails?  3  -GB      Moving to and from a bed to a chair (including a wheelchair)?  3  -GB      Standing up from a chair using your arms (e.g., wheelchair, bedside chair)?  3  -GB      Climbing 3-5 steps with a railing?  2  -GB      To walk in hospital room?  3  -GB      AM-PAC 6 Clicks Score (PT)  17  -GB         Functional Assessment    Outcome Measure Options  AM-PAC 6 Clicks Basic Mobility (PT)  -GB        User Key  (r) = Recorded By, (t) = Taken By, (c) = Cosigned By    Initials Name Provider Type    Any Mckeon, PT Physical Therapist         Time Calculation:   PT Charges     Row Name 08/15/20 1636             Time Calculation    Start Time  1350  -GB      Stop Time  1435  -GB      Time Calculation (min)  45 min  -GB      PT Received On  08/15/20  -GB      PT Goal Re-Cert Due Date  08/24/20  -GB        User Key  (r) = Recorded By, (t) = Taken By, (c) = Cosigned By    Initials Name Provider Type    Any Mckeon, PT Physical Therapist        Therapy Charges for Today     Code Description Service Date Service Provider Modifiers Qty    [id number] HC PT EVAL MOD COMPLEXITY 3 8/15/2020 Any Dow, PT GP 1          PT G-Codes  Outcome Measure Options: AM-PAC 6 Clicks Basic Mobility (PT)  AM-PAC 6 Clicks Score (PT): 17      Any Dow PT  8/15/2020

## 2020-08-15 NOTE — THERAPY EVALUATION
Acute Care - Occupational Therapy Initial Evaluation  [facility]     Patient Name: Lili Arguello  : 1950  MRN: [id number]  Today's Date: 8/15/2020  Onset of Illness/Injury or Date of Surgery: 20  Date of Referral to OT: 20  Referring Physician: FELICITAS Long MD    Admit Date: 2020       ICD-10-CM ICD-9-CM   1. ADRIANNA (acute kidney injury) (CMS/HCC) N17.9 584.9   2. Urinary tract infection without hematuria, site unspecified N39.0 599.0   3. Speech disturbance, unspecified type R47.9 784.59   4. Fall, initial encounter W19.XXXA E888.9   5. Impaired functional mobility, balance, gait, and endurance Z74.09 V49.89   6. Impaired mobility and activities of daily living Z74.09 V49.89    Z78.9      Patient Active Problem List   Diagnosis   • Dyspnea on exertion   • Hypertension   • GERD (gastroesophageal reflux disease)   • Morbid obesity (CMS/HCC)   • Diabetes mellitus (CMS/HCC)   • LVH (left ventricular hypertrophy)   • Diastolic dysfunction   • Viral respiratory illness   • Gout   • History of gastric ulcer   • Insomnia   • Low back pain   • Lower extremity edema   • Neuropathy   • Restless leg syndrome   • Epigastric pain   • Aphasia   • Recurrent falls   • Altered mental status   • ADRIANNA (acute kidney injury) (CMS/HCC)     Past Medical History:   Diagnosis Date   • Chronic gastric ulcer without hemorrhage and without perforation    • Depressive disorder    • Diabetes mellitus (CMS/HCC)    • GERD (gastroesophageal reflux disease)    • Gout    • Hypertension    • Insomnia    • Morbid obesity (CMS/HCC)    • Osteoarthritis of multiple joints      Past Surgical History:   Procedure Laterality Date   • BREAST SURGERY     • CHOLECYSTECTOMY     • ENDOSCOPY     • ENDOSCOPY N/A 2020    Procedure: ESOPHAGOGASTRODUODENOSCOPY possible dilation Fri;  Surgeon: Dameon Benjamin MD;  Location: [facility] ENDOSCOPY;  Service: Gastroenterology;  Laterality: N/A;   • GASTRECTOMY     • JOINT REPLACEMENT      • KNEE SURGERY Bilateral 10/02/2014          OT ASSESSMENT FLOWSHEET (last 12 hours)      Occupational Therapy Evaluation     Row Name 08/15/20 1351                   OT Evaluation Time/Intention    Subjective Information  complains of;pain  -RB        Document Type  evaluation  -RB        Mode of Treatment  co-treatment;physical therapy;occupational therapy  -RB        Patient Effort  excellent  -RB           General Information    Patient Profile Reviewed?  yes  -RB        Onset of Illness/Injury or Date of Surgery  08/14/20  -RB        Referring Physician  FELICITAS Long MD  -RB        Patient Observations  alert;cooperative;agree to therapy  -RB        Prior Level of Function  independent:;min assist:;all household mobility;gait;ADL's  -RB        Equipment Currently Used at Home  cane, straight;walker, rolling  -RB        Existing Precautions/Restrictions  fall  -RB        Equipment Issued to Patient  gait belt  -RB        Risks Reviewed  patient:;LOB  -RB        Benefits Reviewed  patient:;improve function;increase independence;increase strength;increase balance  -RB           Relationship/Environment    Lives With  spouse  -RB           Resource/Environmental Concerns    Current Living Arrangements  home/apartment/condo  -RB           Cognitive Assessment/Intervention- PT/OT    Orientation Status (Cognition)  oriented x 4  -RB           Safety Issues, Functional Mobility    Safety Issues Affecting Function (Mobility)  insight into deficits/self awareness  -RB           Mobility Assessment/Treatment    Extremity Weight-bearing Status  left lower extremity  -RB        Right Lower Extremity (Weight-bearing Status)  weight-bearing as tolerated (WBAT) C/O ankle pain.  -RB           Bed Mobility Assessment/Treatment    Bed Mobility Assessment/Treatment  bed mobility (all) activities  -RB        [place] Level (Bed Mobility)  contact guard assist  -RB        Bed Mobility, Safety Issues  impaired trunk control  for bed mobility  -RB        Assistive Device (Bed Mobility)  bed rails;draw sheet;head of bed elevated  -RB           Functional Mobility    Functional Mobility- Ind. Level  minimum assist (75% patient effort)  -RB        Functional Mobility- Device  rolling walker  -RB        Functional Mobility-Distance (Feet)  6  -RB        Functional Mobility- Safety Issues  step length decreased;balance decreased during turns  -RB           Transfer Assessment/Treatment    Transfer Assessment/Treatment  sit-stand transfer;stand-sit transfer;toilet transfer  -RB           Sit-Stand Transfer    Sit-Stand [place] (Transfers)  minimum assist (75% patient effort)  -RB        Assistive Device (Sit-Stand Transfers)  walker, front-wheeled  -RB           Stand-Sit Transfer    Stand-Sit [place] (Transfers)  minimum assist (75% patient effort)  -RB        Assistive Device (Stand-Sit Transfers)  walker, front-wheeled  -RB           Toilet Transfer    Type (Toilet Transfer)  sit-stand;stand-sit  -RB        [place] Level (Toilet Transfer)  minimum assist (75% patient effort)  -RB        Assistive Device (Toilet Transfer)  grab bars/safety frame;raised toilet seat;walker, front-wheeled  -RB           ADL Assessment/Intervention    BADL Assessment/Intervention  lower body dressing;toileting  -RB           Lower Body Dressing Assessment/Training    Lower Body Dressing [place] Level  lower body dressing skills;doff;socks;minimum assist (75% patient effort)  -RB        Lower Body Dressing Position  edge of bed sitting  -RB           Toileting Assessment/Training    [place] Level (Toileting)  toileting skills;perform perineal hygiene;contact guard assist  -RB        Toileting Position  supported standing  -RB           BADL Safety/Performance    Impairments, BADL Safety/Performance  balance;endurance/activity tolerance;coordination;strength;trunk/postural control  -RB           General ROM    GENERAL ROM COMMENTS  B UE  AROM was WNLs.  -RB           MMT (Manual Muscle Testing)    General MMT Comments  B UE strength was 4/5 grossly.  -RB           Sensory Assessment/Intervention    Sensory General Assessment  no sensation deficits identified  -RB           Positioning and Restraints    Pre-Treatment Position  in bed  -RB        Post Treatment Position  bed  -RB        In Bed  supine;call light within reach;encouraged to call for assist  -RB           Pain Scale: Numbers Pre/Post-Treatment    Pain Scale: Numbers, Pretreatment  3/10  -RB        Pain Scale: Numbers, Post-Treatment  3/10  -RB        Pain Location - Side  Bilateral  -RB        Pain Location - Orientation  lower  -RB        Pain Location  extremity  -RB        Pain Intervention(s)  Medication (See MAR)  -RB           Wound 08/14/20 2235 Left upper arm MASD (Moisture associated skin damage)    Wound - Properties Group Date first assessed: 08/14/20 -KH Time first assessed: 2235 -KH Present on Hospital Admission: Y  -KH Side: Left  -KH Orientation: upper  -KH Location: arm  -KH, arm pit  Primary Wound Type: MASD  -KH       Wound Left breast MASD (Moisture associated skin damage)    Wound - Properties Group Side: Left  -KH Location: breast  -KH, under  Primary Wound Type: MASD  -KH       Wound 08/14/20 2235 Left groin MASD (Moisture associated skin damage)    Wound - Properties Group Date first assessed: 08/14/20 -KH Time first assessed: 2235 -KH Present on Hospital Admission: Y  -KH Side: Left  -KH Location: groin  -KH Primary Wound Type: MASD  -KH       Wound 08/14/20 2235 Right groin MASD (Moisture associated skin damage)    Wound - Properties Group Date first assessed: 08/14/20 -KH Time first assessed: 2235 -KH Present on Hospital Admission: Y  -KH Side: Right  -KH Location: groin  -KH Primary Wound Type: MASD  -KH       Clinical Impression (OT)    Date of Referral to OT  08/14/20  -        OT Diagnosis  Impaired mob and ADLs.  -RB        Functional Level at Time  of Evaluation (OT Eval)  Impaired mob and ADLs.  -RB        Criteria for Skilled Therapeutic Interventions Met (OT Eval)  yes;treatment indicated  -RB        Rehab Potential (OT Eval)  good, to achieve stated therapy goals  -RB        Therapy Frequency (OT Eval)  other (see comments) 5-7 days/wk.  -RB        Predicted Duration of Therapy Intervention (Therapy Eval)  Until D/C or goals met.  -RB        Care Plan Review (OT)  evaluation/treatment results reviewed;care plan/treatment goals reviewed;risks/benefits reviewed;patient/other agree to care plan  -RB        Anticipated Discharge Disposition (OT)  home with 24/7 care;home with home health;home with assist  -RB           Vital Signs    Pre Systolic BP Rehab  115  -RB        Pre Treatment Diastolic BP  66  -RB        Intra Systolic BP Rehab  110  -RB        Intra Treatment Diastolic BP  58  -RB        Post Systolic BP Rehab  107  -RB        Post Treatment Diastolic BP  70  -RB        Pretreatment Heart Rate (beats/min)  78  -RB        Intratreatment Heart Rate (beats/min)  79  -RB        Posttreatment Heart Rate (beats/min)  79  -RB        Pre SpO2 (%)  93  -RB        O2 Delivery Pre Treatment  room air  -RB        Intra SpO2 (%)  96  -RB        O2 Delivery Intra Treatment  room air  -RB        Post SpO2 (%)  92  -RB        O2 Delivery Post Treatment  room air  -RB        Pre Patient Position  Supine  -RB        Intra Patient Position  Standing  -RB        Post Patient Position  Supine  -RB           Planned OT Interventions    Planned Therapy Interventions (OT Eval)  activity tolerance training;adaptive equipment training;BADL retraining;functional balance retraining;occupation/activity based interventions;patient/caregiver education/training;ROM/therapeutic exercise;strengthening exercise;transfer/mobility retraining  -RB           OT Goals    Bed Mobility Goal Selection (OT)  bed mobility, OT goal 1  -RB        Transfer Goal Selection (OT)  transfer, OT goal 1   -RB        Bathing Goal Selection (OT)  bathing, OT goal 1  -RB        Dressing Goal Selection (OT)  dressing, OT goal 1  -RB        Toileting Goal Selection (OT)  toileting, OT goal 1  -RB        Activity Tolerance Goal Selection (OT)  activity tolerance, OT goal 1  -RB        Additional Documentation  Activity Tolerance Goal Selection (OT) (Row)  -RB           Bed Mobility Goal 1 (OT)    Activity/Assistive Device (Bed Mobility Goal 1, OT)  bed mobility activities, all  -RB        [place] Level/Cues Needed (Bed Mobility Goal 1, OT)  conditional independence  -RB        Time Frame (Bed Mobility Goal 1, OT)  long term goal (LTG)  -RB        Progress/Outcomes (Bed Mobility Goal 1, OT)  goal not met  -RB           Transfer Goal 1 (OT)    Activity/Assistive Device (Transfer Goal 1, OT)  transfers, all  -RB        [place] Level/Cues Needed (Transfer Goal 1, OT)  conditional independence  -RB        Time Frame (Transfer Goal 1, OT)  long term goal (LTG)  -RB        Progress/Outcome (Transfer Goal 1, OT)  goal not met  -RB           Bathing Goal 1 (OT)    Activity/Assistive Device (Bathing Goal 1, OT)  bathing skills, all  -RB        [place] Level/Cues Needed (Bathing Goal 1, OT)  contact guard assist  -RB        Time Frame (Bathing Goal 1, OT)  long term goal (LTG)  -RB        Progress/Outcomes (Bathing Goal 1, OT)  goal not met  -RB           Dressing Goal 1 (OT)    Activity/Assistive Device (Dressing Goal 1, OT)  dressing skills, all  -RB        [place]/Cues Needed (Dressing Goal 1, OT)  supervision required  -RB        Time Frame (Dressing Goal 1, OT)  long term goal (LTG)  -RB        Progress/Outcome (Dressing Goal 1, OT)  goal not met  -RB           Toileting Goal 1 (OT)    Activity/Device (Toileting Goal 1, OT)  toileting skills, all  -RB        [place] Level/Cues Needed (Toileting Goal 1, OT)  conditional independence  -RB        Time Frame (Toileting Goal 1, OT)  long term goal (LTG)  -RB         Progress/Outcome (Toileting Goal 1, OT)  goal not met  -RB            Activity Tolerance Goal 1 (OT)    Activity Tolerance Goal 1 (OT)  ADL/therapeutic activity with 1-2 rest breaks.  -RB        Activity Level (Endurance Goal 1, OT)  15 min activity  -RB        Time Frame (Activity Tolerance Goal 1, OT)  long term goal (LTG)  -RB        Progress/Outcome (Activity Tolerance Goal 1, OT)  goal not met  -RB           Living Environment    Home Accessibility  wheelchair accessible  -RB          User Key  (r) = Recorded By, (t) = Taken By, (c) = Cosigned By    Initials Name Effective Dates    RB Nathan Casey, OT 07/24/19 -     Magi Wisdom, PATI 05/29/19 -          Occupational Therapy Education                 Title: PT OT SLP Therapies (In Progress)     Topic: Occupational Therapy (In Progress)     Point: ADL training (Not Started)     Description:   Instruct learner(s) on proper safety adaptation and remediation techniques during self care or transfers.   Instruct in proper use of assistive devices.              Learner Progress:   Not documented in this visit.          Point: Home exercise program (Not Started)     Description:   Instruct learner(s) on appropriate technique for monitoring, assisting and/or progressing therapeutic exercises/activities.              Learner Progress:   Not documented in this visit.          Point: Precautions (Done)     Description:   Instruct learner(s) on prescribed precautions during self-care and functional transfers.              Learning Progress Summary           Patient Acceptance, E, VU by RB at 8/15/2020 5218    Comment:  Edu pt on use of gait belt and non skid socks when OOB and no OOB without assist.                   Point: Body mechanics (Not Started)     Description:   Instruct learner(s) on proper positioning and spine alignment during self-care, functional mobility activities and/or exercises.              Learner Progress:   Not documented in this visit.                       User Key     Initials Effective Dates Name Provider Type Discipline    RB 07/24/19 -  Nathan Casey, OT Occupational Therapist OT                  OT Recommendation and Plan  Outcome Summary/Treatment Plan (OT)  Anticipated Discharge Disposition (OT): home with 24/7 care, home with home health, home with assist  Planned Therapy Interventions (OT Eval): activity tolerance training, adaptive equipment training, BADL retraining, functional balance retraining, occupation/activity based interventions, patient/caregiver education/training, ROM/therapeutic exercise, strengthening exercise, transfer/mobility retraining  Therapy Frequency (OT Eval): other (see comments)(5-7 days/wk.)  Plan of Care Review  Plan of Care Reviewed With: patient  Plan of Care Reviewed With: patient  Outcome Summary: OT eval on this date as co-eval with PT.  Pt was CGA for bed mobility and CGA/min A of 2 for toilet transfer.  LE dress with min A on R leg due to ?  R ankle fx.  Pt could benefit from OT services to regain lost function for ADLs and functional mobility.  Rec home with H/H as needed.    Outcome Measures     Row Name 08/15/20 1600 08/15/20 1350          How much help from another person do you currently need...    Turning from your back to your side while in flat bed without using bedrails?  3  -GB  --     Moving from lying on back to sitting on the side of a flat bed without bedrails?  3  -GB  --     Moving to and from a bed to a chair (including a wheelchair)?  3  -GB  --     Standing up from a chair using your arms (e.g., wheelchair, bedside chair)?  3  -GB  --     Climbing 3-5 steps with a railing?  2  -GB  --     To walk in hospital room?  3  -GB  --     AM-PAC 6 Clicks Score (PT)  17  -GB  --        How much help from another is currently needed...    Putting on and taking off regular lower body clothing?  --  2  -RB     Bathing (including washing, rinsing, and drying)  --  2  -RB     Toileting (which includes  using toilet bed pan or urinal)  --  3  -RB     Putting on and taking off regular upper body clothing  --  3  -RB     Taking care of personal grooming (such as brushing teeth)  --  4  -RB     Eating meals  --  4  -RB     AM-PAC 6 Clicks Score (OT)  --  18  -RB        Functional Assessment    Outcome Measure Options  AM-PAC 6 Clicks Basic Mobility (PT)  -GB  AM-PAC 6 Clicks Daily Activity (OT)  -RB       User Key  (r) = Recorded By, (t) = Taken By, (c) = Cosigned By    Initials Name Provider Type    RB Nathan Casey OT Occupational Therapist    Any Mckeon, PT Physical Therapist          Time Calculation:   Time Calculation- OT     Row Name 08/15/20 1654             Time Calculation- OT    OT Start Time  1350  -RB      OT Stop Time  1435  -RB      OT Time Calculation (min)  45 min  -RB      OT Received On  08/15/20  -RB      OT Goal Re-Cert Due Date  08/28/20  -RB        User Key  (r) = Recorded By, (t) = Taken By, (c) = Cosigned By    Initials Name Provider Type    RB Nathan Casey OT Occupational Therapist        Therapy Charges for Today     Code Description Service Date Service Provider Modifiers Qty    [id number] HC OT EVAL MOD COMPLEXITY 3 8/15/2020 Nathan Casey OT GO 1               Nathan Casey OT  8/15/2020

## 2020-08-15 NOTE — PROGRESS NOTES
[facility]  INPATIENT PROGRESS NOTE    Length of Stay: 1  Date of Admission: 8/14/2020  Primary Care Physician: Sandhya Thomas DO    Subjective   Chief Complaint: Confusion and recurrent fall    HPI: Patient was admitted with confusion and falls.  She is being managed for UTI.    This morning patient's speech has normalized and she is alert and oriented x3.  No more confusion.  MRI of the brain did not show any stroke.  However her creatinine remains elevated after IV fluids.  Of note she was started on Bactrim, pyridium and diflucan for urinary tract infection by PCP.    Review of Systems   Constitutional: Negative for activity change, appetite change, chills, fatigue and fever.   HENT: Negative for congestion, sore throat and trouble swallowing.    Respiratory: Negative for cough, chest tightness, shortness of breath and wheezing.    Cardiovascular: Negative for chest pain, palpitations and leg swelling.   Gastrointestinal: Negative for abdominal distention, abdominal pain, diarrhea, nausea and vomiting.   Genitourinary: Positive for dysuria. Negative for difficulty urinating and hematuria.   Musculoskeletal: Negative for arthralgias, back pain and myalgias.   Skin: Negative for pallor and rash.   Neurological: Negative for dizziness, syncope, weakness, light-headedness and headaches.   Hematological: Negative for adenopathy. Does not bruise/bleed easily.   Psychiatric/Behavioral: Negative for agitation and confusion. The patient is not nervous/anxious.      Objective    Temp:  [98.2 °F (36.8 °C)-98.6 °F (37 °C)] 98.6 °F (37 °C)  Heart Rate:  [] 81  Resp:  [18-20] 18  BP: ()/(52-91) 107/74    Physical Exam   Constitutional: She is oriented to person, place, and time. She appears well-developed and well-nourished. No distress.   HENT:   Head: Normocephalic and atraumatic.   Mouth/Throat: Oropharynx is clear and moist. No oropharyngeal exudate.     Eyes: Pupils are equal, round, and reactive to light. Conjunctivae and EOM are normal. No scleral icterus.   Neck: Normal range of motion. Neck supple. No JVD present. No tracheal deviation present. No thyromegaly present.   Cardiovascular: Normal rate, regular rhythm, normal heart sounds and intact distal pulses. Exam reveals no gallop and no friction rub.   No murmur heard.  Pulmonary/Chest: Effort normal and breath sounds normal. No stridor. No respiratory distress. She has no wheezes. She has no rales. She exhibits no tenderness.   Abdominal: Soft. Bowel sounds are normal. She exhibits no distension and no mass. There is no tenderness. There is no rebound and no guarding. No hernia.   Musculoskeletal: Normal range of motion. She exhibits no edema, tenderness or deformity.   Lymphadenopathy:     She has no cervical adenopathy.   Neurological: She is alert and oriented to person, place, and time. No cranial nerve deficit. She exhibits normal muscle tone.   Skin: Skin is warm and dry. Capillary refill takes less than 2 seconds. No rash noted. She is not diaphoretic. No erythema. No pallor.   Psychiatric: She has a normal mood and affect. Her behavior is normal. Judgment and thought content normal.     Medication Review:    Current Facility-Administered Medications:   •  acetaminophen (TYLENOL) tablet 650 mg, 650 mg, Oral, Q4H PRN, Matthias Veras MD  •  albuterol (PROVENTIL) nebulizer solution 0.083% 2.5 mg/3mL, 2.5 mg, Nebulization, Q4H PRN, Matthias Veras MD  •  aspirin EC tablet 81 mg, 81 mg, Oral, Daily, Matthias Veras MD, 81 mg at 08/15/20 1102  •  carbidopa-levodopa (SINEMET)  MG per tablet 1 tablet, 1 tablet, Oral, TID, Matthias Veras MD, 1 tablet at 08/15/20 0822  •  cefTRIAXone (ROCEPHIN) 1 g/100 mL 0.9% NS (MBP), 1 g, Intravenous, Q24H, Matthias Veras MD  •  heparin (porcine) 5000 UNIT/ML injection 5,000 Units, 5,000 Units, Subcutaneous, Q8H, Matthias Veras MD, 5,000 Units  at 08/15/20 1303  •  isosorbide mononitrate (IMDUR) 24 hr tablet 30 mg, 30 mg, Oral, Daily, Matthias Veras MD, 30 mg at 08/15/20 0822  •  metoprolol tartrate (LOPRESSOR) tablet 25 mg, 25 mg, Oral, Q12H, Matthias Veras MD, 25 mg at 08/15/20 1102  •  nystatin (MYCOSTATIN) powder, , Topical, Q12H, Matthias Veras MD  •  ondansetron ODT (ZOFRAN-ODT) disintegrating tablet 8 mg, 8 mg, Oral, Q8H PRN, Matthias Veras MD  •  oxyCODONE-acetaminophen (PERCOCET) 5-325 MG per tablet 1 tablet, 1 tablet, Oral, Q4H PRN, Matthias Veras MD, 1 tablet at 08/15/20 1304  •  pantoprazole (PROTONIX) EC tablet 40 mg, 40 mg, Oral, Q AM, Matthias Veras MD, 40 mg at 08/15/20 0503  •  sodium chloride 0.9 % flush 10 mL, 10 mL, Intravenous, Q12H, Matthias Veras MD, 10 mL at 08/15/20 0823  •  sodium chloride 0.9 % flush 10 mL, 10 mL, Intravenous, PRN, Matthias Veras MD  •  sodium chloride 0.9 % infusion, 100 mL/hr, Intravenous, Continuous, Mack Lyman MD, Last Rate: 100 mL/hr at 08/15/20 1304, 100 mL/hr at 08/15/20 1304  •  sucralfate (CARAFATE) tablet 1 g, 1 g, Oral, 4x Daily, Matthias Veras MD, 1 g at 08/15/20 1304    I have reviewed the patient's current medications.     Results Review:  I have reviewed the labs, radiology results, and diagnostic studies.    Laboratory Data:   Results from last 7 days   Lab Units 08/15/20  0448 08/14/20  2044   SODIUM mmol/L 138 135*   POTASSIUM mmol/L 5.4* 5.0   CHLORIDE mmol/L 102 99   CO2 mmol/L 22.0 22.0   BUN mg/dL 29* 29*   CREATININE mg/dL 2.30* 2.29*   GLUCOSE mg/dL 106* 107*   CALCIUM mg/dL 9.2 8.7   BILIRUBIN mg/dL  --  0.4   ALK PHOS U/L  --  69   ALT (SGPT) U/L  --  <5   AST (SGOT) U/L  --  10   ANION GAP mmol/L 14.0 14.0     Estimated Creatinine Clearance: 27.5 mL/min (A) (by C-G formula based on SCr of 2.3 mg/dL (H)).          Results from last 7 days   Lab Units 08/14/20  1949   WBC 10*3/mm3 7.74   HEMOGLOBIN g/dL 13.6   HEMATOCRIT % 40.6   PLATELETS  10*3/mm3 354           Culture Data:   No results found for: BLOODCX  No results found for: URINECX  No results found for: RESPCX  No results found for: WOUNDCX  No results found for: STOOLCX  No components found for: BODYFLD    Radiology Data:   Imaging Results (Last 24 Hours)     Procedure Component Value Units Date/Time    MRI Brain Without Contrast [[id number]] Collected:  08/15/20 0924     Updated:  08/15/20 1216    Narrative:       PROCEDURE: MRI BRAIN WO CONTRAST    TECHNIQUE:  Standard department protocol noncontrast MRI brain.    CLINICAL INDICATION: Ataxia, speech disturbance    COMPARISON STUDY: Noncontrast head CT dated 8/14/2020    FINDINGS: Examination is limited by motion artifact  Cerebral parenchyma appears grossly normally formed for age. No  acute hemorrhage or infarct. There is extensive increased T2 and  FLAIR signal in subcortical and periventricular white matter,  suggestive of microangiopathic change No mass effect or midline  shift. Ventricular system and CSF spaces unremarkable.    Visualized paranasal sinuses demonstrate mild mucosal thickening  ethmoid sinuses. Grossly normal flow void vessels of [place]  [place].      Impression:       Examination limited by motion artifact, but no acute  intracranial abnormality identified. Generalized cerebral atrophy  and presumed microangiopathic changes are present as above.    Electronically signed by:  Juliette Davis MD  8/15/2020 12:15 PM  CDT Workstation: [id number]    MRI Angiogram Neck Without Contrast [[id number]] Collected:  08/15/20 0954     Updated:  08/15/20 1149    Narrative:       PROCEDURE: MRI ANGIOGRAM NECK WO CONTRAST    INDICATION: ATAXIA, SPEECH DIFFICULTY, FALL    COMPARISON STUDY: NONE    TECHNIQUE: Images were accomplished according to standard CTA  protocol without use of intravenous contrast    FINDINGS: Examination is markedly limited by motion artifact. By  report, the patient has tremors. This significantly limits  detection  of possible stenosis. No definite occlusion identified.  Vessels appear diffusely irregular due to motion artifact The  origin of the right vertebral artery is not well visualized.      Impression:       Significant limitation due to motion artifact, which  renders this examination essentially nondiagnostic. No definite  major vascular occlusion. Origin of the right vertebral artery  not well visualized    Electronically signed by:  Juliette Davis MD  8/15/2020 11:47 AM  CDT Workstation: [id number]    MRI Angiogram Head Without Contrast [[id number]] Collected:  08/15/20 0953     Updated:  08/15/20 1141    Narrative:       PROCEDURE: MRI ANGIOGRAM HEAD WO CONTRAST    COMPARISON STUDY: Noncontrast head CT dated 8/14/2020    CLINICAL INDICATION: Ataxia, speech difficulty, fall    TECHNIQUE:  Standard noncontrast MRA of the brain was performed. There are no  previous studies for comparison.    FINDINGS: No proximal stenotic lesions are seen. There is  symmetric visualization of the proximal intracranial vessels. The  carotid siphons are clear. There is no vertebral basilar  stenosis. No evidence of aneurysm.      Impression:       Negative MRA of the brain.    Electronically signed by:  Juliette Davis MD  8/15/2020 11:40 AM  CDT Workstation: [id number]    XR Knee 4+ View Right [[id number]] Collected:  08/14/20 2001     Updated:  08/14/20 2100    Narrative:       PROCEDURE: XR KNEE 4+ VW RIGHT    VIEWS: 3    INDICATION: Trauma    COMPARISON: 10/2/2014    FINDINGS:     -Tricomponent total knee prosthesis in standard position with  no radiographic evidence of periprosthetic fracture. No joint  effusion identified.          Impression:       Unremarkable appearance of tricomponent total knee  prosthesis. No evidence of acute osseous abnormality.    Electronically signed by:  Juliette Davis MD  8/14/2020 8:59 PM CDT  Workstation: [id number]    XR Chest 1 View [[id number]] Collected:  08/14/20 1929     Updated:  08/14/20 2059     Narrative:       PROCEDURE: XR CHEST 1 VW    VIEWS:Single    INDICATION: Altered mental status protocol    COMPARISON: CXR: 4/9/2017    FINDINGS:       - lines/tubes: None    - cardiac: Size within normal limits.    - mediastinum: Contour within normal limits.     - lungs: No evidence of a focal air space process, pulmonary  interstitial edema, nodule(s)/mass. Right hemidiaphragm again  noted to be elevated    - pleura: No evidence of  fluid.      - osseous: Unremarkable for age.      Impression:       No acute abnormality identified      Electronically signed by:  Juliette Davis MD  8/14/2020 8:57 PM CDT  Workstation: [id number]    XR Hip With or Without Pelvis 2 - 3 View Right [[id number]] Collected:  08/14/20 2001     Updated:  08/14/20 2055    Narrative:       PROCEDURE: XR HIP W OR WO PELVIS 2-3 VIEW RIGHT    VIEWS:  3      INDICATION: Pain multiple    COMPARISON: None    FINDINGS:     - fracture: None    - alignment: Within normal limits    - misc: Degenerative changes of both hips, sacroiliac joints      Impression:       No acute osseous abnormality identified.      Note:  if pain or symptoms persist beyond reasonable expectations    and follow-up imaging is anticipated,  cross sectional imaging   (CT and/or MRI) is suggested, as is deemed clinically   appropriate.    Electronically signed by:  Juliette Davis MD  8/14/2020 8:54 PM CDT  Workstation: [id number]    XR Ankle 3+ View Bilateral [[id number]] Collected:  08/14/20 2001     Updated:  08/14/20 2053    Narrative:       PROCEDURE: XR ANKLE 3+ VW BILATERAL    VIEWS:   3  views each    INDICATION: Pain    COMPARISON: None    FINDINGS:   Right ankle    - fracture: Minimal irregularity at the tip of the medial  malleolus, nondisplaced fracture not excluded    - alignment: WNL    - misc calcaneal spurring at the insertion of the plantar  aponeurosis and the Achilles tendon.      Left ankle    - fracture: None    - alignment: WNL    - misc:  Calcaneal spurring at  the insertion of the plantar  aponeurosis and the Achilles tendon.          Impression:       Minimal irregularity of the tip of the medial malleolus of the  right ankle, for which a tiny avulsion fracture cannot be  excluded, particularly if there is point tenderness here.  Clinical correlation needed.      (Note:  if pain or symptoms persist beyond reasonable  expectations and follow-up imaging is anticipated,  scintigraphic  or cross sectional imaging (CT and/or MRI) is suggested, as is  deemed clinically appropriate).    Electronically signed by:  Juliette Davis MD  8/14/2020 8:51 PM CDT  Workstation: [id number]    CT Head Without Contrast [[id number]] Collected:  08/14/20 1946     Updated:  08/14/20 2020    Narrative:       PROCEDURE: CT HEAD WO CONTRAST    INDICATION:  Fall, altered mental status    COMPARISON:  None    TECHNIQUE:  CT head, without iv contrast.       This exam was performed according to our departmental  dose-optimization program, which includes automated exposure  control, adjustment of the mA and/or kV according to patient size  and/or use of iterative reconstruction technique.  DLP is 939.7    FINDINGS:    The degree of cerebral atrophy is within normal limits for age.    There is preservation of the gray-white matter differentiation.      No focal parenchymal lesions.    There are mild age related degenerative cortical and deep white  matter changes.    There is no evidence of a hemorrhage or intracranial mass.    There is no midline shift.    The ventricles are normal in size and configuration.    The brain stem, cerebellum, globes, paranasal sinuses, and  osseous structures are within normal limits.        Impression:       No acute intracranial abnormality. Generalized  cerebral atrophy and presumed microangiopathic change    If pain or symptoms persist beyond reasonable expectations, an  MRI examination is suggested as is deemed clinically appropriate.    Electronically signed by:   Juliette Davis MD  8/14/2020 8:19 PM CDT  Workstation: [id number]          Assessment/Plan     Hospital Problem List:  Principal Problem:    Recurrent falls  Active Problems:    Hypertension    Aphasia    Altered mental status    ADRIANNA (acute kidney injury) (CMS/HCC)  Acute cystitis without hematuria  Recurrent falls      Plan  -Patient speech has normalized.  MRI was negative for stroke.  Can discontinue neurochecks.  Follow-up EEG  -Patient symptoms likely from acute cystitis.  Continue IV antibiotics with ceftriaxone  - Follow-up urine cultures  - Patient has ADRIANNA on CKD with prior creatinine a year ago of 1.5.  Creatinine today is 2.3  -Nephrology consultation has been sent  - Due to hyperkalemia switch IV fluids to IV normal saline at 100 cc an hour  -Discontinue Aldactone  - Encourage patient to drink adequate water as she mostly drinks soda next-continue with home medications  - DVT prophylaxis with subcutaneous heparin  -CODE STATUS is full code    Discharge Planning: In progress    Mack Lyman MD   08/15/20   16:51

## 2020-08-15 NOTE — ED PROVIDER NOTES
Subjective   Patient presents emergency department with complaints of a fall as well as confusion with altered mental status.  Patient was brought by family.  Patient is evidently had multiple falls in the past week.  Patient does use a walker at baseline.  Patient is a very difficult historian, unable to give a coherent history of what is been happening in terms of her falls.  Patient is very distractible and very tangential in her discussions.  That being said patient is generally nontoxic appearing in no acute distress at this time.  Patient denies any fevers chills, chest pain, shortness of breath, no bowel or bladder changes.          Review of Systems   Constitutional: Negative for appetite change, chills and fever.   HENT: Negative.  Negative for congestion.    Eyes: Negative.  Negative for photophobia and visual disturbance.   Respiratory: Negative.  Negative for cough, chest tightness and shortness of breath.    Cardiovascular: Negative.  Negative for chest pain and palpitations.   Gastrointestinal: Negative.  Negative for abdominal pain, constipation, diarrhea, nausea and vomiting.   Endocrine: Negative.    Genitourinary: Negative.  Negative for decreased urine volume, dysuria, flank pain and hematuria.   Musculoskeletal: Negative.  Negative for arthralgias, back pain, myalgias, neck pain and neck stiffness.   Skin: Negative.  Negative for pallor.   Neurological: Positive for weakness. Negative for dizziness, syncope, light-headedness, numbness and headaches.   Psychiatric/Behavioral: Positive for confusion and decreased concentration. Negative for suicidal ideas. The patient is not nervous/anxious.    All other systems reviewed and are negative.      Past Medical History:   Diagnosis Date   • Chronic gastric ulcer without hemorrhage and without perforation    • Depressive disorder    • Diabetes mellitus (CMS/HCC)    • GERD (gastroesophageal reflux disease)    • Gout    • Hypertension    • Insomnia    •  Morbid obesity (CMS/HCC)    • Osteoarthritis of multiple joints        Allergies   Allergen Reactions   • Iodine       iodine:  UNRESPONSIVENESS   • Other      ARTIFICIAL SWEETNER:  Rash: TOPICAL ANTIBACERTIAL AGENTS:  Rash   • Benzoyl Peroxide Rash   • Latex Rash   • Tape Rash       Past Surgical History:   Procedure Laterality Date   • BREAST SURGERY     • CHOLECYSTECTOMY     • ENDOSCOPY     • ENDOSCOPY N/A 2020    Procedure: ESOPHAGOGASTRODUODENOSCOPY possible dilation Fri;  Surgeon: Dameon Benjamin MD;  Location: [facility] ENDOSCOPY;  Service: Gastroenterology;  Laterality: N/A;   • GASTRECTOMY     • JOINT REPLACEMENT     • KNEE SURGERY Bilateral 10/02/2014       Family History   Problem Relation Age of Onset   • Lung cancer Other        Social History     Socioeconomic History   • Marital status:      Spouse name: Not on file   • Number of children: Not on file   • Years of education: Not on file   • Highest education level: Not on file   Tobacco Use   • Smoking status: Former Smoker     Last attempt to quit: 2013     Years since quittin.3   • Smokeless tobacco: Never Used   • Tobacco comment: 30 pack year history   Substance and Sexual Activity   • Alcohol use: No   • Drug use: Yes     Types: Oxycodone     Comment: prescribed norco q6 prn    • Sexual activity: Defer           Objective   Physical Exam   Constitutional: She is oriented to person, place, and time. She appears well-developed and well-nourished.  Non-toxic appearance. She does not appear ill. No distress.   HENT:   Head: Normocephalic and atraumatic.   Nose: Nose normal.   Mouth/Throat: Oropharynx is clear and moist.   Eyes: Pupils are equal, round, and reactive to light. Conjunctivae and EOM are normal. No scleral icterus.   Neck: Normal range of motion. Neck supple. No JVD present.   Cardiovascular: Normal rate, regular rhythm, normal heart sounds and intact distal pulses. Exam reveals no gallop and no friction rub.   No murmur  heard.  Pulmonary/Chest: Effort normal. No respiratory distress. She has no wheezes. She has no rales. She exhibits no tenderness.   Abdominal: Soft. She exhibits no distension and no mass. There is no tenderness. There is no rebound and no guarding.   Musculoskeletal: Normal range of motion. She exhibits no edema, tenderness or deformity.   Lymphadenopathy:     She has no cervical adenopathy.   Neurological: She is alert and oriented to person, place, and time. No cranial nerve deficit. She exhibits normal muscle tone.   Skin: Skin is warm and dry. Capillary refill takes less than 2 seconds. No rash noted. She is not diaphoretic. No erythema. No pallor.   Psychiatric: She has a normal mood and affect. Judgment and thought content normal. Her speech is rapid and/or pressured. Cognition and memory are normal. She expresses no suicidal ideation. She expresses no suicidal plans.   Nursing note and vitals reviewed.      Procedures           ED Course                                   Labs Reviewed   COMPREHENSIVE METABOLIC PANEL - Abnormal; Notable for the following components:       Result Value    Glucose 107 (*)     BUN 29 (*)     Creatinine 2.29 (*)     Sodium 135 (*)     eGFR Non  Amer 21 (*)     All other components within normal limits    Narrative:     GFR Normal >60  Chronic Kidney Disease <60  Kidney Failure <15     URINALYSIS W/ MICROSCOPIC IF INDICATED (NO CULTURE) - Abnormal; Notable for the following components:    Color, UA Orange (*)     Ketones, UA Trace (*)     Bilirubin, UA Small (1+) (*)     Leuk Esterase, UA Small (1+) (*)     Nitrite, UA Positive (*)     All other components within normal limits   CBC WITH AUTO DIFFERENTIAL - Abnormal; Notable for the following components:    .1 (*)     MCH 34.9 (*)     RDW-SD 55.5 (*)     All other components within normal limits   URINALYSIS, MICROSCOPIC ONLY - Abnormal; Notable for the following components:    RBC, UA 0-2 (*)     All other  components within normal limits   HEMOGLOBIN A1C - Abnormal; Notable for the following components:    Hemoglobin A1C 5.70 (*)     All other components within normal limits    Narrative:     Hemoglobin A1C Ranges:    Increased Risk for Diabetes  5.7% to 6.4%  Diabetes                     >= 6.5%  Diabetic Goal                < 7.0%   AMMONIA - Normal   TSH - Normal   RAINBOW DRAW    Narrative:     The following orders were created for panel order [place] Draw.  Procedure                               Abnormality         Status                     ---------                               -----------         ------                     Light Blue Top[[id number]]                                   Final result               Green Top (Gel)[[id number]]                                  Final result               Lavender Top[[id number]]                                     Final result               Gold Top - SST[[id number]]                                   Final result                 Please view results for these tests on the individual orders.   FOLATE   VITAMIN B12   LIPID PANEL   VITAMIN D 25 HYDROXY   BASIC METABOLIC PANEL   CBC AND DIFFERENTIAL    Narrative:     The following orders were created for panel order CBC & Differential.  Procedure                               Abnormality         Status                     ---------                               -----------         ------                     CBC Auto Differential[[id number]]        Abnormal            Final result                 Please view results for these tests on the individual orders.   LIGHT BLUE TOP   GREEN TOP   LAVENDER TOP   GOLD TOP - SST       XR Chest 1 View   Final Result   No acute abnormality identified         Electronically signed by:  Juliette Davis MD  8/14/2020 8:57 PM CDT   Workstation: [id number]      XR Hip With or Without Pelvis 2 - 3 View Right   Final Result   No acute osseous abnormality identified.         Note:  if pain or symptoms  persist beyond reasonable expectations      and follow-up imaging is anticipated,  cross sectional imaging    (CT and/or MRI) is suggested, as is deemed clinically    appropriate.      Electronically signed by:  Juliette Davis MD  8/14/2020 8:54 PM CDT   Workstation: [id number]      XR Knee 4+ View Right   Final Result   Unremarkable appearance of tricomponent total knee   prosthesis. No evidence of acute osseous abnormality.      Electronically signed by:  Juliette Davis MD  8/14/2020 8:59 PM CDT   Workstation: [id number]      XR Ankle 3+ View Bilateral   Final Result   Minimal irregularity of the tip of the medial malleolus of the   right ankle, for which a tiny avulsion fracture cannot be   excluded, particularly if there is point tenderness here.   Clinical correlation needed.         (Note:  if pain or symptoms persist beyond reasonable   expectations and follow-up imaging is anticipated,  scintigraphic   or cross sectional imaging (CT and/or MRI) is suggested, as is   deemed clinically appropriate).      Electronically signed by:  Juliette Davis MD  8/14/2020 8:51 PM CDT   Workstation: [id number]      CT Head Without Contrast   Final Result   No acute intracranial abnormality. Generalized   cerebral atrophy and presumed microangiopathic change      If pain or symptoms persist beyond reasonable expectations, an   MRI examination is suggested as is deemed clinically appropriate.      Electronically signed by:  Juliette Davis MD  8/14/2020 8:19 PM CDT   Workstation: [id number]      MRI Brain With & Without Contrast    (Results Pending)   MRI Angiogram Head Without Contrast    (Results Pending)   MRI Angiogram Neck With Contrast    (Results Pending)     Patient's case discussed with neurology with her multiple symptoms speech difficulties and difficulty ambulating with falls.  Possible iatrogenic symptoms secondary to her medications which she is currently controlling.  Possible UTI though lower suspicion of the same  versus possible stroke.  Patient is not available for contrast secondary to allergy, will have MRI in the a.m. for further evaluation.        MDM    Final diagnoses:   ADRIANNA (acute kidney injury) (CMS/[facility])   Urinary tract infection without hematuria, site unspecified   Speech disturbance, unspecified type   Fall, initial encounter            Troy Erazo MD  08/15/20 0018

## 2020-08-15 NOTE — PLAN OF CARE
Problem: Patient Care Overview  Goal: Plan of Care Review  Outcome: Ongoing (interventions implemented as appropriate)  Flowsheets (Taken 8/15/2020 6205)  Outcome Summary: PT christine completed as co christine w/ OT. Pt energetic and willing to get up/work. She transfered to/from [facility] w/ min-CGA of 2 , walked about 3ft and 5 ft w/ FWRW & CGA x 2 w/out LOB or c/o. Goal will be indep mobility and gait to enable her to return home to family w/ assist and possible home health if needed.

## 2020-08-15 NOTE — PLAN OF CARE
Problem: Patient Care Overview  Goal: Plan of Care Review  Outcome: Ongoing (interventions implemented as appropriate)  Flowsheets (Taken 8/15/2020 1223)  Progress: improving  Plan of Care Reviewed With: patient; daughter  Outcome Summary: Pt was seen for bedside speech language assessment following altered mental status upon arrival to ED. Pt has a hx of increased falls. Pt does have a UTI which could contribute to confusion. Daughter was present and stated that pt is doing much better and seems more like herself. Pt was a good historian and completed all task presented. Pt completed the BCAT with a total score of 46/50 and the BIMS with a total score of 14/15. Pt does not present with any deficits that require skilled ST therapy. All questions answered. This was an evaluation only. Pt and daughter in agreement. RN notified.

## 2020-08-15 NOTE — PROGRESS NOTES
Adult Nutrition  Assessment    Patient Name:  Lili Arguello  YOB: 1950  MRN: [id number]  Admit Date:  8/14/2020    Assessment Date:  8/15/2020    Comments:  70yo female admit with noted recurrent falls w/ AMS & aphasia, noted ADRIANNA on admit- MD ?etiology, possible UTI and urine culture pending. Noted skin breakdown to Left breast, bilateral groin area and LUE on admit. PMH includes CKD stg 3, DM2, GERD and gastric ulcer. Labs noted- A1C acceptable at 5.7, BUN/Cr/K elev, alb wnl and cholesterol/triglycerides elevated. Meds noted. Current wt  249#, BMI 44.1 and 216%IBW, indicative of morbid obesity. ST was working w/ pt at time of RD visit, did not complete interview. Current diet Regular cardiac, no intakes available. RD to follow hospital course and attempt interview and diet education as appropriate at later date.    Reason for Assessment     Row Name 08/15/20 1253          Reason for Assessment    Reason For Assessment  identified at risk by screening criteria     Diagnosis  neurologic conditions;renal disease     Identified At Risk by Screening Criteria  BMI;large or nonhealing wound, burn or pressure injury         Nutrition/Diet History     Row Name 08/15/20 1256          Nutrition/Diet History    Typical Food/Fluid Intake  Pt currently working w/ ST- did not interview           Labs/Tests/Procedures/Meds     Row Name 08/15/20 1256          Labs/Procedures/Meds    Lab Results Reviewed  reviewed     Lab Results Comments  A1c 5.7, Glu 107/106, K 5.4H, BUN 29H/Cr 2.3H, elev total chol and elev triglyc, Alb 4.0        Diagnostic Tests/Procedures    Diagnostic Test/Procedure Reviewed  reviewed     Diagnostic Test/Procedures Comments  XR khalif ankle, rt knee & hip, cxr, CT head        Medications    Pertinent Medications Reviewed  reviewed     Pertinent Medications Comments  aldactone           Estimated/Assessed Needs     Row Name 08/15/20 1257          Calculation Measurements    Weight Used  For Calculations  67.1 kg (148 lb) current wt 249#, used Adjusted wt        Estimated/Assessed Needs    Additional Documentation  Fluid Requirements (Group);Protein Requirements (Group);Calorie Requirements (Group);KCAL/KG (Group)        KCAL/KG    KCAL/KG  25 Kcal/Kg (kcal)     25 Kcal/Kg (kcal)  1678.3        Protein Requirements    Weight Used For Protein Calculations  67.1 kg (148 lb)     Est Protein Requirement Amount (gms/kg)  0.8 gm protein     Estimated Protein Requirements (gms/day)  53.71        Fluid Requirements    Estimated Fluid Requirements (mL/day)  1600     RDA Method (mL)  1600         Nutrition Prescription Ordered     Row Name 08/15/20 1259          Nutrition Prescription PO    Current PO Diet  Regular     Common Modifiers  Cardiac         Evaluation of Received Nutrient/Fluid Intake     Row Name 08/15/20 1259          PO Evaluation    Number of Days PO Intake Evaluated  Insufficient Data               Electronically signed by:  Janelle Medina RD  08/15/20 13:01

## 2020-08-15 NOTE — CONSULTS
[facility]  [address] [place] [address]. [id number]   - [phone]  F  [phone]     Consultation         PATIENT  DEMOGRAPHICS   PATIENT NAME: Lili Arguello                      PHYSICIAN: TOBI Laird   : 1950  MRN: [id number]    Subjective   SUBJECTIVE   Referring Provider: Dr Wolfe  Reason for Consultation: ADRIANNA  on CKD  History of present illness:    69-year-old female with past medical history significant for hypertension, type 2 diabetes mellitus, gastroesophageal reflux disease and morbid obesity who presents to the emergency department with recurrent falls and speech difficulties with confusion.  Per reports patient has not suffered multiple falls in the past 24 hours and family states that she has been increasingly confused with speech that has not been making sense.  There are no reports of loss of consciousness and the patient does not appear to have suffered any trauma to the head.  Patient's speech was described as a disoriented word salad without significant slurring or weakness in the face.  This is a new problem for the patient.  Notably the patient was recently treated for urinary tract infection.     PMH significant for chronic gastric ulcer, GERD, gout, HTN and OA. She reports HTN for long time which has been well controlled by BB and spironolactone- Sees Dr Melton annually. She reports she has had trouble with her stomach for long time but denies ever having gastrectomy of partial gastrectomy. She reports she has to take pantoprazole twice a day or she has terrible reflux with vomiting.    Patient reports she had injury to her kidneys about 5 years ago during surgery for her knees when she got some kind of medication that caused hematuria and she had trouble for a while following this but thought issue had resolved. She saw Dr Lam during this admission. Her baseline creatinine one year ago was 1.30mg/dL. She reports she had symptoms  of UTI about a week ago and sought care and got a little pill that she was to take twice a day and she did- she is not sure what it was but from her description sounds like cipro. She was drinking less than usual and started feeling bad. Then she got confused and had the fall. Creatinine on admission was 2.29mg/dL. Nephrology has been consulted for management of ADRIANNA on CKD 3.     Past Medical History:   Diagnosis Date   • Chronic gastric ulcer without hemorrhage and without perforation    • Depressive disorder    • Diabetes mellitus (CMS/HCC)    • GERD (gastroesophageal reflux disease)    • Gout    • Hypertension    • Insomnia    • Morbid obesity (CMS/HCC)    • Osteoarthritis of multiple joints      Past Surgical History:   Procedure Laterality Date   • BREAST SURGERY     • CHOLECYSTECTOMY     • ENDOSCOPY     • ENDOSCOPY N/A 2020    Procedure: ESOPHAGOGASTRODUODENOSCOPY possible dilation Fri;  Surgeon: Dameon Benjamin MD;  Location: [facility] ENDOSCOPY;  Service: Gastroenterology;  Laterality: N/A;   • GASTRECTOMY     • JOINT REPLACEMENT     • KNEE SURGERY Bilateral 10/02/2014     Family History   Problem Relation Age of Onset   • Lung cancer Other      Social History     Tobacco Use   • Smoking status: Former Smoker     Last attempt to quit: 2013     Years since quittin.3   • Smokeless tobacco: Never Used   • Tobacco comment: 30 pack year history   Substance Use Topics   • Alcohol use: No   • Drug use: Yes     Types: Oxycodone     Comment: prescribed norco q6 prn      Allergies:  Iodine; Other; Benzoyl peroxide; Latex; and Tape     REVIEW OF SYSTEMS    Review of Systems   Constitutional: Positive for activity change and fatigue.   HENT:        Dry mouth   Eyes: Negative.    Respiratory: Negative.    Cardiovascular: Negative.    Gastrointestinal: Negative.    Endocrine: Positive for cold intolerance.   Genitourinary: Positive for dysuria.   Musculoskeletal: Positive for arthralgias.   Skin: Negative.   "  Neurological: Positive for dizziness and weakness.   Hematological: Negative.    Psychiatric/Behavioral: Positive for confusion.       Objective   OBJECTIVE   Vital Signs  Temp:  [98.2 °F (36.8 °C)-98.6 °F (37 °C)] 98.6 °F (37 °C)  Heart Rate:  [69-92] 92  Resp:  [18-20] 18  BP: ()/(52-91) 131/59    Flowsheet Rows      First Filed Value   Admission Height  160 cm (63\") Documented at 08/14/2020 1926   Admission Weight  107 kg (235 lb) Documented at 08/14/2020 1926             I/O last 3 completed shifts:  In: 1918.3 [P.O.:120; I.V.:1598.3; IV Piggyback:200]  Out: 500 [Urine:500]    PHYSICAL EXAM    Physical Exam   Constitutional: She is oriented to person, place, and time. She appears well-developed and well-nourished.   HENT:   Head: Normocephalic and atraumatic.   Eyes: Pupils are equal, round, and reactive to light.   Neck: Normal range of motion.   Cardiovascular: Normal rate, regular rhythm and normal heart sounds.   Pulmonary/Chest: Breath sounds normal.   Abdominal: Soft. Bowel sounds are normal. She exhibits distension.   Musculoskeletal: Normal range of motion. She exhibits edema.   Neurological: She is alert and oriented to person, place, and time.   Patient reports she knows she still has some confusion- not as sharp as usual.   Psychiatric: She has a normal mood and affect. Her behavior is normal.   Nursing note and vitals reviewed.      RESULTS   Results Review:    Results from last 7 days   Lab Units 08/15/20  0448 08/14/20 2044   SODIUM mmol/L 138 135*   POTASSIUM mmol/L 5.4* 5.0   CHLORIDE mmol/L 102 99   CO2 mmol/L 22.0 22.0   BUN mg/dL 29* 29*   CREATININE mg/dL 2.30* 2.29*   CALCIUM mg/dL 9.2 8.7   BILIRUBIN mg/dL  --  0.4   ALK PHOS U/L  --  69   ALT (SGPT) U/L  --  <5   AST (SGOT) U/L  --  10   GLUCOSE mg/dL 106* 107*       Estimated Creatinine Clearance: 27.5 mL/min (A) (by C-G formula based on SCr of 2.3 mg/dL (H)).                Results from last 7 days   Lab Units 08/1950 "   WBC 10*3/mm3 7.74   HEMOGLOBIN g/dL 13.6   PLATELETS 10*3/mm3 354              MEDICATIONS      aspirin 81 mg Oral Daily   carbidopa-levodopa 1 tablet Oral TID   cefTRIAXone 1 g Intravenous Q24H   heparin (porcine) 5,000 Units Subcutaneous Q8H   isosorbide mononitrate 30 mg Oral Daily   metoprolol tartrate 25 mg Oral Q12H   nystatin  Topical Q12H   pantoprazole 40 mg Oral Q AM   sodium chloride 10 mL Intravenous Q12H   spironolactone 25 mg Oral Daily   sucralfate 1 g Oral 4x Daily       sodium chloride 100 mL/hr     Medications Prior to Admission   Medication Sig Dispense Refill Last Dose   • albuterol (PROVENTIL HFA;VENTOLIN HFA) 108 (90 BASE) MCG/ACT inhaler Inhale 2 puffs Every 4 (Four) Hours As Needed for Wheezing. 1 inhaler 1 Taking   • aspirin 81 MG EC tablet Take 1 tablet by mouth Daily. 30 tablet 1 Taking   • carbidopa-levodopa (SINEMET)  MG per tablet Take 1 tablet by mouth 3 (Three) Times a Day.   Taking   • cyclobenzaprine (FLEXERIL) 10 MG tablet TAKE ONE TABLET BY MOUTH TWO TIMES DAILY AS NEEDED FOR MUSCLE SPASMS   Taking   • dicyclomine (BENTYL) 20 MG tablet Take 1 tablet by mouth 4 (Four) Times a Day Before Meals & at Bedtime As Needed (cramping) for up to 135 days. 90 tablet 5    • gabapentin (NEURONTIN) 300 MG capsule TAKE 1 CAPSULE BY ORAL ROUTE 3 TIMES A DAY  1 Taking   • HYDROcodone-acetaminophen (NORCO) 7.5-325 MG per tablet Take 1 tablet by mouth Every 6 (Six) Hours As Needed for moderate pain (4-6).   Taking   • isosorbide mononitrate (IMDUR) 30 MG 24 hr tablet Take 30 mg by mouth Daily.   Taking   • metoprolol tartrate (LOPRESSOR) 25 MG tablet Take 25 mg by mouth 2 (Two) Times a Day.   Taking   • ondansetron ODT (ZOFRAN-ODT) 8 MG disintegrating tablet Take 1 tablet by mouth Every 8 (Eight) Hours As Needed for Nausea or Vomiting. 8 tablet 0 Taking   • pantoprazole (PROTONIX) 40 MG EC tablet Take 40 mg by mouth.      • spironolactone (ALDACTONE) 25 MG tablet Take 25 mg by mouth Daily.    Taking   • sucralfate (Carafate) 1 g tablet Take 1 tablet by mouth 4 (Four) Times a Day. 120 tablet 2    • zolpidem (AMBIEN) 10 MG tablet Take 10 mg by mouth At Night As Needed.  2 Taking     Assessment/Plan   ASSESSMENT / PLAN      Recurrent falls    Hypertension    Diabetes mellitus (CMS/HCC)    Aphasia    Altered mental status    ADRIANNA (acute kidney injury) (CMS/[facility])    1. ADRIANNA on CKD 3  - creatinine one year ago 1.3mg/dL. ADRIANNA most likely related to recent UTI and decreased oral hydration. CKD most likely related to age, PPI use and HTN. Not on thiazide diuretic PTA. Urine shows positive nitrites and leukocytes. UA also shows negative blood, negative protein, RBCs 0-2, WBCs 0-2, hyaline casts 3-6. Had renal US 2015- will repeat. Will request UPCR, iPTH, phos, uric acid, magnesium.   - she received 2 liters of LR initially on arrival ,and has recently been switched to NS at 100cc/hr. She is drinking almost exclusively either V8 or Dr Pepper- she reports water upsets her stomach.  - she did received MRA but this was done without contrast.  -UOP is low    2. HTN- BP is acceptable. She sees Dr Melton annually. Taking spironolactone 25 mg po qd. Will hold this is while potassium is elevated. Continue metoprolol 25mg po bid and isosorbide.    3. GERD,chronic gastric ulcer  - on pantoprazole 40mg po qd    4. Hyperkalemia-  Potassium 5.4- will stop spironolactone and recheck in the morning    5. UTI  -On ceftriaxone    6. OA  - does not take NSAIDS taking hydrocodone for pain- got oxycodone today for pain.    Thank you for your referral will continue to follow with you         I discussed the patients findings and my recommendations with patient      This document has been electronically signed by TOBI Laird on August 15, 2020 12:49

## 2020-08-15 NOTE — THERAPY DISCHARGE NOTE
Inpatient Rehabilitation - Speech Language Pathology Initial Eval/Discharge  [facility]     Patient Name: Lili Arguello  : 1950  MRN: [id number]  Today's Date: 8/15/2020               Admit Date: 2020    Pt was seen for bedside speech language assessment following altered mental status upon arrival to ED. Pt has a hx of increased falls. Pt does have a UTI which could contribute to confusion. Daughter was present and stated that pt is doing much better and seems more like herself. Pt was a good historian and completed all task presented. Pt completed the BCAT with a total score of 46/50 and the BIMS with a total score of 14/15. Pt does not present with any deficits that require skilled ST therapy. All questions answered. This was an evaluation only. Pt and daughter in agreement. RN notified.    Pt completed AM meal with no difficulties or s/s of aspiration as reported by RN.     Amanda Romano MS CCC-SLP    Visit Dx:    ICD-10-CM ICD-9-CM   1. ADRIANNA (acute kidney injury) (CMS/HCC) N17.9 584.9   2. Urinary tract infection without hematuria, site unspecified N39.0 599.0   3. Speech disturbance, unspecified type R47.9 784.59   4. Fall, initial encounter W19.XXXA E888.9     Patient Active Problem List   Diagnosis   • Dyspnea on exertion   • Hypertension   • GERD (gastroesophageal reflux disease)   • Morbid obesity (CMS/HCC)   • Diabetes mellitus (CMS/HCC)   • LVH (left ventricular hypertrophy)   • Diastolic dysfunction   • Viral respiratory illness   • Gout   • History of gastric ulcer   • Insomnia   • Low back pain   • Lower extremity edema   • Neuropathy   • Restless leg syndrome   • Epigastric pain   • Aphasia   • Recurrent falls   • Altered mental status   • ADRIANNA (acute kidney injury) (CMS/HCC)     Past Medical History:   Diagnosis Date   • Chronic gastric ulcer without hemorrhage and without perforation    • Depressive disorder    • Diabetes mellitus (CMS/HCC)    • GERD (gastroesophageal  reflux disease)    • Gout    • Hypertension    • Insomnia    • Morbid obesity (CMS/HCC)    • Osteoarthritis of multiple joints      Past Surgical History:   Procedure Laterality Date   • BREAST SURGERY     • CHOLECYSTECTOMY     • ENDOSCOPY     • ENDOSCOPY N/A 7/6/2020    Procedure: ESOPHAGOGASTRODUODENOSCOPY possible dilation Fri;  Surgeon: Dameon Benjamin MD;  Location: [facility] ENDOSCOPY;  Service: Gastroenterology;  Laterality: N/A;   • GASTRECTOMY     • JOINT REPLACEMENT     • KNEE SURGERY Bilateral 10/02/2014          SLP EVALUATION (last 72 hours)      SLP SLC Evaluation     Row Name 08/15/20 1049                   Communication Assessment/Intervention    Document Type  evaluation  -EA        Subjective Information  no complaints  -EA        Patient Observations  alert;cooperative;agree to therapy  -EA        Patient/Family Observations  Daughter present   -EA        Patient Effort  excellent  -EA        Symptoms Noted During/After Treatment  none  -EA           General Information    Patient Profile Reviewed  yes  -EA        Pertinent History Of Current Problem  No hx of CVA; hx of increased falls; UTI   -EA        Precautions/Limitations, Vision  WFL with corrective lenses  -EA        Precautions/Limitations, Hearing  WFL  -EA        Prior Level of Function-Communication  WFL  -EA        Plans/Goals Discussed with  patient;family  -EA        Barriers to Rehab  none identified  -EA        Patient's Goals for Discharge  patient did not state  -EA           Pain Assessment    Additional Documentation  Pain Scale: Numbers Pre/Post-Treatment (Group)  -EA           Pain Scale: Numbers Pre/Post-Treatment    Pain Scale: Numbers, Pretreatment  5/10  -EA        Pain Scale: Numbers, Post-Treatment  5/10  -EA        Pre/Post Treatment Pain Comment  twisted ankle when she fell   -EA           Oral Motor Structure and Function    Oral Motor Structure and Function  WFL  -EA        Dentition Assessment  edentulous, dentures  not available  -EA        Mucosal Quality  moist, healthy  -EA           Motor Speech Assessment/Intervention    Motor Speech Function  WFL  -EA           Cursory Voice Assessment/Intervention    Quality and Resonance (Voice)  WFL  -EA           Cognitive Assessment Intervention- SLP    Cognitive Function (Cognition)  WFL  -EA        Orientation Status (Cognition)  mild impairment  -EA        Memory (Cognitive)  mild impairment;delayed  -EA        Attention (Cognitive)  WFL  -EA        Thought Organization (Cognitive)  WFL  -EA        Reasoning (Cognitive)  WFL  -EA        Problem Solving (Cognitive)  WFL  -EA        Functional Math (Cognitive)  WFL;simple  -EA        Executive Function (Cognition)  WFL  -EA        Pragmatics (Communication)  WFL  -EA        Right Hemisphere Function  WFL  -EA        Cognition, Comment  BCAT 46/50; BIMS 14/15  -EA           SLP Clinical Impressions    SLC Criteria for Skilled Therapy Interventions Met  no problems identified which require skilled intervention  -EA           Recommendations    Therapy Frequency (SLP SLC)  evaluation only  -EA        Anticipated Dischage Disposition (SLP)  unknown  -EA          User Key  (r) = Recorded By, (t) = Taken By, (c) = Cosigned By    Initials Name Effective Dates    Amanda Calzada MS CCC-SLP 08/09/20 -            EDUCATION  The patient has been educated in the following areas:   Cognitive Impairment.      SLP Recommendation and Plan        SLC Criteria for Skilled Therapy Interventions Met: no problems identified which require skilled intervention  Anticipated Dischage Disposition (SLP): unknown          Plan of Care Reviewed With: patient, daughter  Progress: improving  Outcome Summary: Pt was seen for bedside speech language assessment following altered mental status upon arrival to ED. Pt has a hx of increased falls. Pt does have a UTI which could contribute to confusion. Daughter was present and stated that pt is doing much  better and seems more like herself. Pt was a good historian and completed all task presented. Pt completed the BCAT with a total score of 46/50 and the BIMS with a total score of 14/15. Pt does not present with any deficits that require skilled ST therapy. All questions answered. This was an evaluation only. Pt and daughter in agreement. RN notified.              Time Calculation:   Time Calculation- SLP     Row Name 08/15/20 1226             Time Calculation- SLP    SLP Start Time  1049  -EA      SLP Stop Time  1127  -EA      SLP Time Calculation (min)  38 min  -EA      Total Timed Code Minutes- SLP  38 minute(s)  -EA      SLP Received On  08/15/20  -EA      SLP Goal Re-Cert Due Date  08/29/20  -EA        User Key  (r) = Recorded By, (t) = Taken By, (c) = Cosigned By    Initials Name Provider Type    Amanda Calzada MS CCC-SLP Speech and Language Pathologist          Therapy Charges for Today     Code Description Service Date Service Provider Modifiers Qty    [id number] HC ST EVAL SPEECH AND PROD W LANG  3 8/15/2020 Amanda Romano MS CCC-SLP GN 1                   SLP Discharge Summary  Anticipated Dischage Disposition (SLP): unknown    MS JOSE Crowley  8/15/2020

## 2020-08-16 ENCOUNTER — APPOINTMENT (OUTPATIENT)
Dept: GENERAL RADIOLOGY | Facility: HOSPITAL | Age: 70
End: 2020-08-16

## 2020-08-16 ENCOUNTER — APPOINTMENT (OUTPATIENT)
Dept: ULTRASOUND IMAGING | Facility: HOSPITAL | Age: 70
End: 2020-08-16

## 2020-08-16 LAB
25(OH)D3 SERPL-MCNC: 21.7 NG/ML (ref 30–100)
ALBUMIN SERPL-MCNC: 3.9 G/DL (ref 3.5–5.2)
ALBUMIN/GLOB SERPL: 1.5 G/DL
ALP SERPL-CCNC: 64 U/L (ref 39–117)
ALT SERPL W P-5'-P-CCNC: <5 U/L (ref 1–33)
ANION GAP SERPL CALCULATED.3IONS-SCNC: 13 MMOL/L (ref 5–15)
AST SERPL-CCNC: 9 U/L (ref 1–32)
BACTERIA UR QL AUTO: ABNORMAL /HPF
BILIRUB SERPL-MCNC: 0.5 MG/DL (ref 0–1.2)
BILIRUB UR QL STRIP: NEGATIVE
BUN SERPL-MCNC: 22 MG/DL (ref 8–23)
BUN/CREAT SERPL: 12.6 (ref 7–25)
CALCIUM SPEC-SCNC: 8.8 MG/DL (ref 8.6–10.5)
CHLORIDE SERPL-SCNC: 103 MMOL/L (ref 98–107)
CLARITY UR: ABNORMAL
CO2 SERPL-SCNC: 20 MMOL/L (ref 22–29)
COLOR UR: ABNORMAL
CREAT SERPL-MCNC: 1.74 MG/DL (ref 0.57–1)
CREAT UR-MCNC: 53.2 MG/DL
FOLATE SERPL-MCNC: <2 NG/ML (ref 4.78–24.2)
GFR SERPL CREATININE-BSD FRML MDRD: 29 ML/MIN/1.73
GLOBULIN UR ELPH-MCNC: 2.6 GM/DL
GLUCOSE SERPL-MCNC: 109 MG/DL (ref 65–99)
GLUCOSE UR STRIP-MCNC: NEGATIVE MG/DL
HGB UR QL STRIP.AUTO: NEGATIVE
HYALINE CASTS UR QL AUTO: ABNORMAL /LPF
KETONES UR QL STRIP: NEGATIVE
LEUKOCYTE ESTERASE UR QL STRIP.AUTO: ABNORMAL
MAGNESIUM SERPL-MCNC: 1.9 MG/DL (ref 1.6–2.4)
NITRITE UR QL STRIP: NEGATIVE
PH UR STRIP.AUTO: 6 [PH] (ref 5–9)
PHOSPHATE SERPL-MCNC: 2.2 MG/DL (ref 2.5–4.5)
POTASSIUM SERPL-SCNC: 4.9 MMOL/L (ref 3.5–5.2)
PROT SERPL-MCNC: 6.5 G/DL (ref 6–8.5)
PROT UR QL STRIP: NEGATIVE
PROT UR-MCNC: 5 MG/DL
PROT/CREAT UR: 94 MG/G CREA (ref 0–200)
RBC # UR: ABNORMAL /HPF
REF LAB TEST METHOD: ABNORMAL
SODIUM SERPL-SCNC: 136 MMOL/L (ref 136–145)
SP GR UR STRIP: 1.01 (ref 1–1.03)
SQUAMOUS #/AREA URNS HPF: ABNORMAL /HPF
URATE SERPL-MCNC: 5.5 MG/DL (ref 2.4–5.7)
UROBILINOGEN UR QL STRIP: ABNORMAL
VIT B12 BLD-MCNC: 215 PG/ML (ref 211–946)
WBC UR QL AUTO: ABNORMAL /HPF

## 2020-08-16 PROCEDURE — 81001 URINALYSIS AUTO W/SCOPE: CPT | Performed by: NURSE PRACTITIONER

## 2020-08-16 PROCEDURE — 63710000001 ONDANSETRON ODT 4 MG TABLET DISPERSIBLE: Performed by: INTERNAL MEDICINE

## 2020-08-16 PROCEDURE — 84550 ASSAY OF BLOOD/URIC ACID: CPT | Performed by: NURSE PRACTITIONER

## 2020-08-16 PROCEDURE — 83735 ASSAY OF MAGNESIUM: CPT | Performed by: NURSE PRACTITIONER

## 2020-08-16 PROCEDURE — 76775 US EXAM ABDO BACK WALL LIM: CPT

## 2020-08-16 PROCEDURE — 25010000002 HEPARIN (PORCINE) PER 1000 UNITS: Performed by: INTERNAL MEDICINE

## 2020-08-16 PROCEDURE — 84100 ASSAY OF PHOSPHORUS: CPT | Performed by: NURSE PRACTITIONER

## 2020-08-16 PROCEDURE — 80053 COMPREHEN METABOLIC PANEL: CPT | Performed by: NURSE PRACTITIONER

## 2020-08-16 PROCEDURE — 25010000002 CEFTRIAXONE PER 250 MG: Performed by: INTERNAL MEDICINE

## 2020-08-16 PROCEDURE — 87635 SARS-COV-2 COVID-19 AMP PRB: CPT | Performed by: NURSE PRACTITIONER

## 2020-08-16 PROCEDURE — 74018 RADEX ABDOMEN 1 VIEW: CPT

## 2020-08-16 RX ORDER — ONDANSETRON 2 MG/ML
4 INJECTION INTRAMUSCULAR; INTRAVENOUS EVERY 6 HOURS PRN
Status: DISCONTINUED | OUTPATIENT
Start: 2020-08-16 | End: 2020-08-18 | Stop reason: HOSPADM

## 2020-08-16 RX ORDER — DOCUSATE SODIUM 100 MG/1
100 CAPSULE, LIQUID FILLED ORAL 2 TIMES DAILY
Status: DISCONTINUED | OUTPATIENT
Start: 2020-08-16 | End: 2020-08-18 | Stop reason: HOSPADM

## 2020-08-16 RX ORDER — BISACODYL 5 MG/1
10 TABLET, DELAYED RELEASE ORAL DAILY PRN
Status: DISCONTINUED | OUTPATIENT
Start: 2020-08-16 | End: 2020-08-18 | Stop reason: HOSPADM

## 2020-08-16 RX ADMIN — OXYCODONE HYDROCHLORIDE AND ACETAMINOPHEN 1 TABLET: 5; 325 TABLET ORAL at 15:01

## 2020-08-16 RX ADMIN — SUCRALFATE 1 G: 1 TABLET ORAL at 09:53

## 2020-08-16 RX ADMIN — SUCRALFATE 1 G: 1 TABLET ORAL at 20:39

## 2020-08-16 RX ADMIN — NYSTATIN: 100000 POWDER TOPICAL at 20:39

## 2020-08-16 RX ADMIN — CARBIDOPA AND LEVODOPA 1 TABLET: 25; 100 TABLET ORAL at 16:54

## 2020-08-16 RX ADMIN — PANTOPRAZOLE SODIUM 40 MG: 40 TABLET, DELAYED RELEASE ORAL at 05:16

## 2020-08-16 RX ADMIN — SODIUM CHLORIDE 100 ML/HR: 9 INJECTION, SOLUTION INTRAVENOUS at 09:54

## 2020-08-16 RX ADMIN — CARBIDOPA AND LEVODOPA 1 TABLET: 25; 100 TABLET ORAL at 09:54

## 2020-08-16 RX ADMIN — POLYETHYLENE GLYCOL 3350 17 G: 17 POWDER, FOR SOLUTION ORAL at 20:37

## 2020-08-16 RX ADMIN — DOCUSATE SODIUM 100 MG: 100 CAPSULE, LIQUID FILLED ORAL at 20:38

## 2020-08-16 RX ADMIN — CEFTRIAXONE SODIUM 1 G: 1 INJECTION, POWDER, FOR SOLUTION INTRAMUSCULAR; INTRAVENOUS at 20:38

## 2020-08-16 RX ADMIN — SUCRALFATE 1 G: 1 TABLET ORAL at 11:30

## 2020-08-16 RX ADMIN — ONDANSETRON 8 MG: 4 TABLET, ORALLY DISINTEGRATING ORAL at 02:18

## 2020-08-16 RX ADMIN — SUCRALFATE 1 G: 1 TABLET ORAL at 16:54

## 2020-08-16 RX ADMIN — HEPARIN SODIUM 5000 UNITS: 5000 INJECTION INTRAVENOUS; SUBCUTANEOUS at 05:16

## 2020-08-16 RX ADMIN — METOPROLOL TARTRATE 25 MG: 25 TABLET, FILM COATED ORAL at 20:38

## 2020-08-16 RX ADMIN — HEPARIN SODIUM 5000 UNITS: 5000 INJECTION INTRAVENOUS; SUBCUTANEOUS at 20:38

## 2020-08-16 RX ADMIN — SODIUM CHLORIDE 100 ML/HR: 9 INJECTION, SOLUTION INTRAVENOUS at 20:38

## 2020-08-16 RX ADMIN — OXYCODONE HYDROCHLORIDE AND ACETAMINOPHEN 1 TABLET: 5; 325 TABLET ORAL at 20:38

## 2020-08-16 RX ADMIN — NYSTATIN: 100000 POWDER TOPICAL at 10:05

## 2020-08-16 RX ADMIN — ASPIRIN 81 MG: 81 TABLET, COATED ORAL at 09:54

## 2020-08-16 RX ADMIN — METOPROLOL TARTRATE 25 MG: 25 TABLET, FILM COATED ORAL at 09:54

## 2020-08-16 RX ADMIN — ISOSORBIDE MONONITRATE 30 MG: 30 TABLET, EXTENDED RELEASE ORAL at 10:05

## 2020-08-16 RX ADMIN — CARBIDOPA AND LEVODOPA 1 TABLET: 25; 100 TABLET ORAL at 20:38

## 2020-08-16 NOTE — PROGRESS NOTES
[facility] Medicine Services  INPATIENT PROGRESS NOTE    Length of Stay: 2  Date of Admission: 8/14/2020  Primary Care Physician: Sandhya Thomas DO    Subjective   Chief Complaint: No complaints    HPI:     8/16/2020:  Patient complained of abdominal pain today.  KUB indicated constipation.  Nephrology is following for acute on chronic kidney disease.  Patient reports nausea this a.m.  This afternoon the patient states she has developed a sore throat and chills.  COVID-19 test pending.    H&P:  This is a 69-year-old  female with past medical history of hypertension, DM 2, GERD and morbid obesity that presented to [facility] on 8/14/2020 with complaints of recurrent falls, confusion and speech difficulties.  CT of the head, MRI angiogram of the head and MRI of the brain were negative for any acute findings.  Patient was found to have a UTI and acute kidney injury.  Patient was previously being treated with Bactrim, Pyridium and Diflucan for UTI by her PCP.  EEG showed mild to moderate generalized slowing with no epileptiform activity seen.      Review of Systems   Constitutional: Positive for fatigue. Negative for activity change.   HENT: Negative for ear pain and sore throat.    Eyes: Negative for pain and discharge.   Respiratory: Negative for cough and shortness of breath.    Cardiovascular: Negative for chest pain and palpitations.   Gastrointestinal: Positive for abdominal pain and constipation. Negative for nausea.   Endocrine: Negative for cold intolerance and heat intolerance.   Genitourinary: Negative for difficulty urinating and dysuria.   Musculoskeletal: Negative for arthralgias and gait problem.   Skin: Negative for color change and rash.   Neurological: Negative for dizziness and weakness.   Psychiatric/Behavioral: Negative for agitation and confusion.        Objective    Temp:  [96.4 °F (35.8 °C)-98.3 °F (36.8 °C)] 96.4 °F (35.8 °C)  Heart Rate:   [] 95  Resp:  [18] 18  BP: (107-140)/(58-83) 140/74    Physical Exam   Constitutional: She is oriented to person, place, and time. She appears well-developed and well-nourished.   HENT:   Head: Normocephalic and atraumatic.   Eyes: Pupils are equal, round, and reactive to light. EOM are normal.   Neck: Normal range of motion. Neck supple.   Cardiovascular: Normal rate and regular rhythm.   Pulmonary/Chest: Effort normal and breath sounds normal.   Abdominal: Soft. Bowel sounds are normal.   Musculoskeletal: Normal range of motion.   Neurological: She is alert and oriented to person, place, and time.   Skin: Skin is warm and dry.   Psychiatric: She has a normal mood and affect. Her behavior is normal.     Results Review:  I have reviewed the labs, radiology results, and diagnostic studies.    Laboratory Data:   Results from last 7 days   Lab Units 08/16/20  0530 08/15/20  0448 08/14/20  2044   SODIUM mmol/L 136 138 135*   POTASSIUM mmol/L 4.9 5.4* 5.0   CHLORIDE mmol/L 103 102 99   CO2 mmol/L 20.0* 22.0 22.0   BUN mg/dL 22 29* 29*   CREATININE mg/dL 1.74* 2.30* 2.29*   GLUCOSE mg/dL 109* 106* 107*   CALCIUM mg/dL 8.8 9.2 8.7   BILIRUBIN mg/dL 0.5  --  0.4   ALK PHOS U/L 64  --  69   ALT (SGPT) U/L <5  --  <5   AST (SGOT) U/L 9  --  10   ANION GAP mmol/L 13.0 14.0 14.0     Estimated Creatinine Clearance: 35.8 mL/min (A) (by C-G formula based on SCr of 1.74 mg/dL (H)).  Results from last 7 days   Lab Units 08/16/20  0530   MAGNESIUM mg/dL 1.9   PHOSPHORUS mg/dL 2.2*     Results from last 7 days   Lab Units 08/16/20  0530   URIC ACID mg/dL 5.5     Results from last 7 days   Lab Units 08/14/20  1949   WBC 10*3/mm3 7.74   HEMOGLOBIN g/dL 13.6   HEMATOCRIT % 40.6   PLATELETS 10*3/mm3 354           Culture Data:   No results found for: BLOODCX  No results found for: URINECX  No results found for: RESPCX  No results found for: WOUNDCX  No results found for: STOOLCX  No components found for: BODYFLD    Radiology Data:     Imaging Results (Last 24 Hours)     Procedure Component Value Units Date/Time    XR Abdomen KUB [[id number]] Collected:  08/16/20 1222     Updated:  08/16/20 1333    Narrative:       PROCEDURE: XR ABDOMEN KUB    VIEWS:  3    INDICATION:Abdominal pain/discomfort    COMPARISON: None    FINDINGS:      - Bowel gas pattern: Nonobstructive. Slightly increased stool  in the rectosigmoid    - Free air: None    - Soft tissue:No gross evidence of organomegaly,      an abdominal mass,or ascites    - Calculi: Tiny irregular calcific density measuring  approximately 5 mm in greatest dimension projected over the left  renal shadow probably represents renal calculus    - Osseous:Limited assessment, unremarkable for age.    - Misc: Right upper quadrant clips suggest prior  cholecystectomy        Impression:       1. Non-obstructed bowel gas pattern. Slightly increased stool  burden in the rectosigmoid may indicate constipation.  2. Left nephrolithiasis    Electronically signed by:  Juliette Davis MD  8/16/2020 1:32 PM CDT  Workstation: [id number]    US Renal Bilateral [[id number]] Collected:  08/16/20 0859     Updated:  08/16/20 1132    Narrative:       PROCEDURE: US RENAL BILATERAL    INDICATION:  Acute on chronic kidney disease     COMPARISON:  None    TECHNIQUE:  Ultrasound, renal    FINDINGS:    Kidney, right:      size:  Normal, measuring 10.4 x 4.9 x 5.0 cm    echotexture:  Diffusely mildly echogenic    No nephrolithiasis, solid mass, or collecting system dilation.  Renal cortex appears diffusely thinned    Kidney, left:      size:  Normal, measuring 10.7 x 4.7 x 5.4 cm    echotexture:  Diffusely echogenic    No nephrolithiasis, solid mass, or collecting system dilation    Urinary bladder: Unremarkable, measuring 2.2 x 3.1 x 6.8 cm, and  containing 278 mL of urine      Impression:       1. Echogenic appearance of renal parenchyma bilaterally, likely  the result of patient's chronic medical renal disease  2. Right renal cortex  appears diffusely thinned.      Electronically signed by:  Juliette Davis MD  8/16/2020 11:31 AM  CDT Workstation: [id number]          I have reviewed the patient's current medications.     Assessment/Plan     Active Hospital Problems    Diagnosis POA   • **Recurrent falls [R29.6] Not Applicable   • Aphasia [R47.01] Yes   • Altered mental status [R41.82] Yes   • ADRIANNA (acute kidney injury) (CMS/HCC) [N17.9] Yes   • Diabetes mellitus (CMS/HCC) [E11.9] Yes   • Hypertension [I10] Yes       Plan:    1.  Altered mental status: Patient is alert and oriented today.  Neurological work-up is negative.  2.  Urinary tract infection: Continue Rocephin.  3.  Acute on chronic kidney injury: Nephrology consult appreciated.  Creatinine has improved at 1.74 today.  Continue IV fluids.  Aldactone held.  4.  Dementia: Continue home carbidopa and levodopa.  5.  Hypertension/CAD: Continue home metoprolol, Imdur and aspirin.  6.  Constipation:  Miralax, colace and as needed Dulcolax ordered.   7.  Tiny avulsion fracture, medial malleolus of right ankle:  Walking boot to right foot.     The patient was evaluated during the global COVID-19 pandemic, and the diagnosis was suspected/considered upon their initial presentation.  Evaluation, treatment, and testing were consistent with current guidelines for patients who present with complaints or symptoms that may be related to COVID-19.      Discharge Planning: I expect patient to be discharged to home in 1-2 days.      This document has been electronically signed by TOBI Mcdonald on August 16, 2020 13:47

## 2020-08-16 NOTE — PROGRESS NOTES
"[facility]  [address] [place] [address]. [address]  T - [phone]  F - [phone]     Progress Note          PATIENT  DEMOGRAPHICS   PATIENT NAME: Lili Arguello                      PHYSICIAN: TOBI Laird  : 1950  MRN: [id number]   LOS: 2 days    Patient Care Team:  Sandhya Thomas,  as PCP - General  Subjective   SUBJECTIVE   Patient awake and in no acute distress. She does report feeling chills and having sore throat. N&V - this morning. No SOA. Has not been up today. Patient is a little more confused today than she was yesterday.          Objective   OBJECTIVE   Vital Signs  Temp:  [96.4 °F (35.8 °C)-98.3 °F (36.8 °C)] 96.4 °F (35.8 °C)  Heart Rate:  [] 95  Resp:  [18] 18  BP: (107-140)/(57-83) 140/74    Flowsheet Rows      First Filed Value   Admission Height  160 cm (63\") Documented at 2020   Admission Weight  107 kg (235 lb) Documented at 2020           I/O last 3 completed shifts:  In: 2118.3 [P.O.:320; I.V.:1598.3; IV Piggyback:200]  Out: 2400 [Urine:2400]    PHYSICAL EXAM    Physical Exam   Constitutional: She appears well-developed and well-nourished.   HENT:   Head: Normocephalic and atraumatic.   Eyes: Pupils are equal, round, and reactive to light.   Neck:   Patient reports sore throat   Cardiovascular: Normal rate, regular rhythm and normal heart sounds.   Pulmonary/Chest: Effort normal and breath sounds normal.   Abdominal: Soft. Bowel sounds are normal. She exhibits distension.   Musculoskeletal: Normal range of motion. She exhibits no edema.   Neurological: She is alert.   Oriented x 3.patient is confused in some of her responses to questions asked   Skin: Skin is warm and dry.   Psychiatric: She has a normal mood and affect.   Nursing note and vitals reviewed.      RESULTS   Results Review:    Results from last 7 days   Lab Units 20  0530 08/15/20  0448 20  2044   SODIUM mmol/L 136 138 135*   POTASSIUM " mmol/L 4.9 5.4* 5.0   CHLORIDE mmol/L 103 102 99   CO2 mmol/L 20.0* 22.0 22.0   BUN mg/dL 22 29* 29*   CREATININE mg/dL 1.74* 2.30* 2.29*   CALCIUM mg/dL 8.8 9.2 8.7   BILIRUBIN mg/dL 0.5  --  0.4   ALK PHOS U/L 64  --  69   ALT (SGPT) U/L <5  --  <5   AST (SGOT) U/L 9  --  10   GLUCOSE mg/dL 109* 106* 107*       Estimated Creatinine Clearance: 35.8 mL/min (A) (by C-G formula based on SCr of 1.74 mg/dL (H)).    Results from last 7 days   Lab Units 08/16/20  0530   MAGNESIUM mg/dL 1.9   PHOSPHORUS mg/dL 2.2*       Results from last 7 days   Lab Units 08/16/20  0530   URIC ACID mg/dL 5.5       Results from last 7 days   Lab Units 08/14/20  1949   WBC 10*3/mm3 7.74   HEMOGLOBIN g/dL 13.6   PLATELETS 10*3/mm3 354               Imaging Results (Last 24 Hours)     Procedure Component Value Units Date/Time    XR Abdomen KUB [[id number]] Resulted:  08/16/20 1222     Updated:  08/16/20 1229    US Renal Bilateral [[id number]] Collected:  08/16/20 0859     Updated:  08/16/20 1132    Narrative:       PROCEDURE: US RENAL BILATERAL    INDICATION:  Acute on chronic kidney disease     COMPARISON:  None    TECHNIQUE:  Ultrasound, renal    FINDINGS:    Kidney, right:      size:  Normal, measuring 10.4 x 4.9 x 5.0 cm    echotexture:  Diffusely mildly echogenic    No nephrolithiasis, solid mass, or collecting system dilation.  Renal cortex appears diffusely thinned    Kidney, left:      size:  Normal, measuring 10.7 x 4.7 x 5.4 cm    echotexture:  Diffusely echogenic    No nephrolithiasis, solid mass, or collecting system dilation    Urinary bladder: Unremarkable, measuring 2.2 x 3.1 x 6.8 cm, and  containing 278 mL of urine      Impression:       1. Echogenic appearance of renal parenchyma bilaterally, likely  the result of patient's chronic medical renal disease  2. Right renal cortex appears diffusely thinned.      Electronically signed by:  Juliette Davis MD  8/16/2020 11:31 AM  CDT Workstation: [id number]           MEDICATIONS       aspirin 81 mg Oral Daily   carbidopa-levodopa 1 tablet Oral TID   cefTRIAXone 1 g Intravenous Q24H   heparin (porcine) 5,000 Units Subcutaneous Q8H   isosorbide mononitrate 30 mg Oral Daily   metoprolol tartrate 25 mg Oral Q12H   nystatin  Topical Q12H   pantoprazole 40 mg Oral Q AM   sodium chloride 10 mL Intravenous Q12H   sucralfate 1 g Oral 4x Daily       sodium chloride 100 mL/hr Last Rate: 100 mL/hr (08/16/20 0954)       Assessment/Plan   ASSESSMENT / PLAN      Recurrent falls    Hypertension    Diabetes mellitus (CMS/[facility])    Aphasia    Altered mental status    ADRIANNA (acute kidney injury) (CMS/[facility])    1. CKD 3  - creatinine one year ago 1.3mg/dL. Current creatinine 1.74mg/dL. CKD most likely related to age, PPI use and HTN. She was on bactrim for uti PTA. Not on thiazide diuretic PTA. Urine shows positive nitrites and leukocytes. UA also shows negative blood, negative protein, RBCs 0-2, WBCs 0-2, hyaline casts 3-6. Renal US some cortical thinning, no masses,hydronephrosis or calculi identified. UPCR 93mg/g c, iPTH pending, phos 2.2, uric acid 5.5, magnesium 1.9.   - she received 2 liters of LR initially on arrival.  IVFs NS at 100cc/hr. Decreased oral intake with vomiting- will continue IVFs for now.  She is drinking almost exclusively either V8 or Dr Pepper- she reports water upsets her stomach.  - she did received MRA but this was done without contrast.  - UOP is acceptable     2. HTN- BP is acceptable. She sees Dr Melton annually. Taking spironolactone 25 mg po qd. Will hold this is while potassium is elevated. Continue metoprolol 25mg po bid and isosorbide.     3. GERD,chronic gastric ulcer  - on pantoprazole 40mg po qd     4. Hyperkalemia-  Potassium 4.9-  spironolactone on hold     5. UTI  -On ceftriaxone  - culture was not obtained- new order sent     6. OA  - does not take NSAIDS taking hydrocodone for pain- got oxycodone today for pain.    NOTE:Daughter is requesting that patient COVID tested due  to vomiting and sore throat issue- nursing notified. Pt's  at home is a cancer patient.           This document has been electronically signed by TOBI Laird on August 16, 2020 13:28

## 2020-08-17 LAB
ALBUMIN SERPL-MCNC: 3.7 G/DL (ref 3.5–5.2)
ALBUMIN/GLOB SERPL: 1.5 G/DL
ALP SERPL-CCNC: 56 U/L (ref 39–117)
ALT SERPL W P-5'-P-CCNC: <5 U/L (ref 1–33)
ANION GAP SERPL CALCULATED.3IONS-SCNC: 8 MMOL/L (ref 5–15)
AST SERPL-CCNC: 10 U/L (ref 1–32)
BASOPHILS # BLD AUTO: 0.05 10*3/MM3 (ref 0–0.2)
BASOPHILS NFR BLD AUTO: 0.8 % (ref 0–1.5)
BILIRUB SERPL-MCNC: 0.4 MG/DL (ref 0–1.2)
BUN SERPL-MCNC: 17 MG/DL (ref 8–23)
BUN/CREAT SERPL: 11.5 (ref 7–25)
CALCIUM SPEC-SCNC: 8.5 MG/DL (ref 8.6–10.5)
CHLORIDE SERPL-SCNC: 107 MMOL/L (ref 98–107)
CO2 SERPL-SCNC: 23 MMOL/L (ref 22–29)
CREAT SERPL-MCNC: 1.48 MG/DL (ref 0.57–1)
DEPRECATED RDW RBC AUTO: 54.7 FL (ref 37–54)
EOSINOPHIL # BLD AUTO: 0.13 10*3/MM3 (ref 0–0.4)
EOSINOPHIL NFR BLD AUTO: 2 % (ref 0.3–6.2)
ERYTHROCYTE [DISTWIDTH] IN BLOOD BY AUTOMATED COUNT: 14.3 % (ref 12.3–15.4)
GFR SERPL CREATININE-BSD FRML MDRD: 35 ML/MIN/1.73
GLOBULIN UR ELPH-MCNC: 2.5 GM/DL
GLUCOSE SERPL-MCNC: 105 MG/DL (ref 65–99)
HCT VFR BLD AUTO: 38.1 % (ref 34–46.6)
HGB BLD-MCNC: 12.9 G/DL (ref 12–15.9)
IMM GRANULOCYTES # BLD AUTO: 0.03 10*3/MM3 (ref 0–0.05)
IMM GRANULOCYTES NFR BLD AUTO: 0.5 % (ref 0–0.5)
LYMPHOCYTES # BLD AUTO: 2.52 10*3/MM3 (ref 0.7–3.1)
LYMPHOCYTES NFR BLD AUTO: 39.7 % (ref 19.6–45.3)
MCH RBC QN AUTO: 35.3 PG (ref 26.6–33)
MCHC RBC AUTO-ENTMCNC: 33.9 G/DL (ref 31.5–35.7)
MCV RBC AUTO: 104.4 FL (ref 79–97)
MONOCYTES # BLD AUTO: 0.46 10*3/MM3 (ref 0.1–0.9)
MONOCYTES NFR BLD AUTO: 7.2 % (ref 5–12)
NEUTROPHILS NFR BLD AUTO: 3.16 10*3/MM3 (ref 1.7–7)
NEUTROPHILS NFR BLD AUTO: 49.8 % (ref 42.7–76)
NRBC BLD AUTO-RTO: 0 /100 WBC (ref 0–0.2)
PLATELET # BLD AUTO: 317 10*3/MM3 (ref 140–450)
PMV BLD AUTO: 9.4 FL (ref 6–12)
POTASSIUM SERPL-SCNC: 4.1 MMOL/L (ref 3.5–5.2)
PROT SERPL-MCNC: 6.2 G/DL (ref 6–8.5)
RBC # BLD AUTO: 3.65 10*6/MM3 (ref 3.77–5.28)
SARS-COV-2 N GENE RESP QL NAA+PROBE: NOT DETECTED
SODIUM SERPL-SCNC: 138 MMOL/L (ref 136–145)
WBC # BLD AUTO: 6.35 10*3/MM3 (ref 3.4–10.8)

## 2020-08-17 PROCEDURE — 97110 THERAPEUTIC EXERCISES: CPT

## 2020-08-17 PROCEDURE — 97535 SELF CARE MNGMENT TRAINING: CPT

## 2020-08-17 PROCEDURE — 97116 GAIT TRAINING THERAPY: CPT

## 2020-08-17 PROCEDURE — 25010000002 CEFTRIAXONE PER 250 MG: Performed by: INTERNAL MEDICINE

## 2020-08-17 PROCEDURE — 80053 COMPREHEN METABOLIC PANEL: CPT | Performed by: NURSE PRACTITIONER

## 2020-08-17 PROCEDURE — 97530 THERAPEUTIC ACTIVITIES: CPT

## 2020-08-17 PROCEDURE — 97140 MANUAL THERAPY 1/> REGIONS: CPT

## 2020-08-17 PROCEDURE — 25010000002 HEPARIN (PORCINE) PER 1000 UNITS: Performed by: INTERNAL MEDICINE

## 2020-08-17 PROCEDURE — 85025 COMPLETE CBC W/AUTO DIFF WBC: CPT | Performed by: NURSE PRACTITIONER

## 2020-08-17 RX ORDER — HYDROCODONE BITARTRATE AND ACETAMINOPHEN 5; 325 MG/1; MG/1
1 TABLET ORAL EVERY 6 HOURS PRN
Status: DISCONTINUED | OUTPATIENT
Start: 2020-08-17 | End: 2020-08-18 | Stop reason: HOSPADM

## 2020-08-17 RX ORDER — HYDROCODONE BITARTRATE AND ACETAMINOPHEN 7.5; 325 MG/1; MG/1
1 TABLET ORAL EVERY 6 HOURS PRN
Status: DISCONTINUED | OUTPATIENT
Start: 2020-08-17 | End: 2020-08-17

## 2020-08-17 RX ADMIN — SUCRALFATE 1 G: 1 TABLET ORAL at 20:30

## 2020-08-17 RX ADMIN — HEPARIN SODIUM 5000 UNITS: 5000 INJECTION INTRAVENOUS; SUBCUTANEOUS at 04:58

## 2020-08-17 RX ADMIN — SUCRALFATE 1 G: 1 TABLET ORAL at 08:37

## 2020-08-17 RX ADMIN — NYSTATIN: 100000 POWDER TOPICAL at 08:37

## 2020-08-17 RX ADMIN — HYDROCODONE BITARTRATE AND ACETAMINOPHEN 1 TABLET: 7.5; 325 TABLET ORAL at 08:41

## 2020-08-17 RX ADMIN — HEPARIN SODIUM 5000 UNITS: 5000 INJECTION INTRAVENOUS; SUBCUTANEOUS at 14:52

## 2020-08-17 RX ADMIN — CARBIDOPA AND LEVODOPA 1 TABLET: 25; 100 TABLET ORAL at 16:35

## 2020-08-17 RX ADMIN — OXYCODONE HYDROCHLORIDE AND ACETAMINOPHEN 1 TABLET: 5; 325 TABLET ORAL at 03:17

## 2020-08-17 RX ADMIN — OXYCODONE HYDROCHLORIDE AND ACETAMINOPHEN 1 TABLET: 5; 325 TABLET ORAL at 06:27

## 2020-08-17 RX ADMIN — METOPROLOL TARTRATE 25 MG: 25 TABLET, FILM COATED ORAL at 08:37

## 2020-08-17 RX ADMIN — HYDROCODONE BITARTRATE AND ACETAMINOPHEN 1 TABLET: 5; 325 TABLET ORAL at 16:35

## 2020-08-17 RX ADMIN — ISOSORBIDE MONONITRATE 30 MG: 30 TABLET, EXTENDED RELEASE ORAL at 08:37

## 2020-08-17 RX ADMIN — CARBIDOPA AND LEVODOPA 1 TABLET: 25; 100 TABLET ORAL at 08:41

## 2020-08-17 RX ADMIN — ASPIRIN 81 MG: 81 TABLET, COATED ORAL at 08:37

## 2020-08-17 RX ADMIN — SODIUM CHLORIDE 100 ML/HR: 9 INJECTION, SOLUTION INTRAVENOUS at 12:10

## 2020-08-17 RX ADMIN — SUCRALFATE 1 G: 1 TABLET ORAL at 12:10

## 2020-08-17 RX ADMIN — CARBIDOPA AND LEVODOPA 1 TABLET: 25; 100 TABLET ORAL at 20:30

## 2020-08-17 RX ADMIN — SODIUM CHLORIDE, PRESERVATIVE FREE 10 ML: 5 INJECTION INTRAVENOUS at 20:32

## 2020-08-17 RX ADMIN — PANTOPRAZOLE SODIUM 40 MG: 40 TABLET, DELAYED RELEASE ORAL at 04:58

## 2020-08-17 RX ADMIN — DOCUSATE SODIUM 100 MG: 100 CAPSULE, LIQUID FILLED ORAL at 08:37

## 2020-08-17 RX ADMIN — HEPARIN SODIUM 5000 UNITS: 5000 INJECTION INTRAVENOUS; SUBCUTANEOUS at 20:31

## 2020-08-17 RX ADMIN — DOCUSATE SODIUM 100 MG: 100 CAPSULE, LIQUID FILLED ORAL at 20:30

## 2020-08-17 RX ADMIN — SUCRALFATE 1 G: 1 TABLET ORAL at 18:31

## 2020-08-17 RX ADMIN — CEFTRIAXONE SODIUM 1 G: 1 INJECTION, POWDER, FOR SOLUTION INTRAMUSCULAR; INTRAVENOUS at 20:34

## 2020-08-17 RX ADMIN — NYSTATIN: 100000 POWDER TOPICAL at 20:31

## 2020-08-17 RX ADMIN — METOPROLOL TARTRATE 25 MG: 25 TABLET, FILM COATED ORAL at 20:30

## 2020-08-17 NOTE — THERAPY TREATMENT NOTE
Acute Care - Occupational Therapy Treatment Note  [facility]     Patient Name: Lili Arguello  : 1950  MRN: [id number]  Today's Date: 2020  Onset of Illness/Injury or Date of Surgery: 20  Date of Referral to OT: 20  Referring Physician: FELICITAS Long MD    Admit Date: 2020       ICD-10-CM ICD-9-CM   1. ADRIANNA (acute kidney injury) (CMS/HCC) N17.9 584.9   2. Urinary tract infection without hematuria, site unspecified N39.0 599.0   3. Speech disturbance, unspecified type R47.9 784.59   4. Fall, initial encounter W19.XXXA E888.9   5. Impaired functional mobility, balance, gait, and endurance Z74.09 V49.89   6. Impaired mobility and activities of daily living Z74.09 V49.89    Z78.9      Patient Active Problem List   Diagnosis   • Dyspnea on exertion   • Hypertension   • GERD (gastroesophageal reflux disease)   • Morbid obesity (CMS/HCC)   • Diabetes mellitus (CMS/HCC)   • LVH (left ventricular hypertrophy)   • Diastolic dysfunction   • Viral respiratory illness   • Gout   • History of gastric ulcer   • Insomnia   • Low back pain   • Lower extremity edema   • Neuropathy   • Restless leg syndrome   • Epigastric pain   • Aphasia   • Recurrent falls   • Altered mental status   • ADRIANNA (acute kidney injury) (CMS/HCC)     Past Medical History:   Diagnosis Date   • Chronic gastric ulcer without hemorrhage and without perforation    • Depressive disorder    • Diabetes mellitus (CMS/HCC)    • GERD (gastroesophageal reflux disease)    • Gout    • Hypertension    • Insomnia    • Morbid obesity (CMS/HCC)    • Osteoarthritis of multiple joints      Past Surgical History:   Procedure Laterality Date   • BREAST SURGERY     • CHOLECYSTECTOMY     • ENDOSCOPY     • ENDOSCOPY N/A 2020    Procedure: ESOPHAGOGASTRODUODENOSCOPY possible dilation Fri;  Surgeon: Dameon Benjamin MD;  Location: [facility] ENDOSCOPY;  Service: Gastroenterology;  Laterality: N/A;   • GASTRECTOMY     • JOINT REPLACEMENT     •  KNEE SURGERY Bilateral 10/02/2014       Therapy Treatment    Rehabilitation Treatment Summary     Row Name 08/17/20 1454             Treatment Time/Intention    Discipline  occupational therapist  -RB      Document Type  therapy note (daily note)  -RB      Mode of Treatment  occupational therapy  -RB2      Therapy Frequency (OT Eval)  other (see comments) 5-7 days/wk.  -RB      Patient Effort  excellent  -RB      Existing Precautions/Restrictions  fall  -RB      Recorded by [RB] Ntahan Casey OT 08/17/20 1505  [RB2] Nathan Casey OT 08/17/20 1557      Row Name 08/17/20 1454             Vital Signs    Pre Systolic BP Rehab  115  -RB      Pre Treatment Diastolic BP  56  -RB      Post Systolic BP Rehab  152  -RB2      Post Treatment Diastolic BP  76  -RB2      Pretreatment Heart Rate (beats/min)  71  -RB      Posttreatment Heart Rate (beats/min)  72  -RB2      Pre SpO2 (%)  96  -RB2      O2 Delivery Pre Treatment  room air  -RB2      Intra SpO2 (%)  96  -RB2      O2 Delivery Intra Treatment  room air  -RB2      Post SpO2 (%)  97  -RB2      O2 Delivery Post Treatment  room air  -RB2      Pre Patient Position  Supine  -RB3      Intra Patient Position  Standing  -RB3      Post Patient Position  Supine  -RB3      Recorded by [RB] Nathan Casey OT 08/17/20 1505  [RB2] Nathan Casey OT 08/17/20 1535  [RB3] Nathan Casey OT 08/17/20 1557      Row Name 08/17/20 1454             Cognitive Assessment/Intervention- PT/OT    Orientation Status (Cognition)  oriented x 4  -RB      Recorded by [RB] Nathan Casey OT 08/17/20 1505      Row Name 08/17/20 1454             Mobility Assessment/Intervention    Extremity Weight-bearing Status  right lower extremity  -RB      Left Lower Extremity (Weight-bearing Status)  weight-bearing as tolerated (WBAT)  -RB      Right Lower Extremity (Weight-bearing Status)  other (see comments) C/O ankle pain.  -RB      Recorded by [RB] Nathan Casey OT 08/17/20 1557      Row Name 08/17/20 1452              Bed Mobility Assessment/Treatment    Bed Mobility Assessment/Treatment  bed mobility (all) activities  -RB      Murray Level (Bed Mobility)  supervision  -RB2      Bed Mobility, Safety Issues  --  -RB2      Assistive Device (Bed Mobility)  bed rails;head of bed elevated  -RB2      Recorded by [RB] Nathan Casey OT 08/17/20 1505  [RB2] Nathan Casey OT 08/17/20 1557      Row Name 08/17/20 1454             Functional Mobility    Functional Mobility- Ind. Level  --  -RB      Functional Mobility- Device  --  -RB      Recorded by [RB] Nathan Casey OT 08/17/20 1557      Row Name 08/17/20 1454             Transfer Assessment/Treatment    Transfer Assessment/Treatment  --  -RB      Recorded by [RB] Nathan Casey OT 08/17/20 1557      Row Name 08/17/20 1454             Sit-Stand Transfer    Sit-Stand Murray (Transfers)  --  -RB      Assistive Device (Sit-Stand Transfers)  --  -RB      Recorded by [RB] Nathan Casey OT 08/17/20 1557      Row Name 08/17/20 1454             Stand-Sit Transfer    Stand-Sit Murray (Transfers)  --  -RB      Assistive Device (Stand-Sit Transfers)  --  -RB      Recorded by [RB] Nathan Casey OT 08/17/20 1557      Row Name 08/17/20 1454             Toilet Transfer    Type (Toilet Transfer)  --  -RB      Murray Level (Toilet Transfer)  --  -RB      Assistive Device (Toilet Transfer)  --  -RB      Recorded by [RB] Nathan Casey OT 08/17/20 1557      Row Name 08/17/20 1454             Lower Body Dressing Assessment/Training    Lower Body Dressing Murray Level  lower body dressing skills;doff;socks;pants/bottoms;contact guard assist  -RB      Lower Body Dressing Position  edge of bed sitting  -RB2      Recorded by [RB] Nathan Casey OT 08/17/20 1557  [RB2] Nathan Casey OT 08/17/20 1505      Row Name 08/17/20 1454             Toileting Assessment/Training    Murray Level (Toileting)  --  -RB      Toileting Position  --  -RB      Recorded by [RB]  Nathan Casey OT 08/17/20 1557      Row Name 08/17/20 1454             Motor Skills Assessment/Interventions    Additional Documentation  Therapeutic Exercise (Group);Therapeutic Exercise Interventions (Group)  -RB      Recorded by [RB] Nathan Casey OT 08/17/20 1117      Row Name 08/17/20 1454             Therapeutic Exercise    Upper Extremity (Therapeutic Exercise)  bicep curl, bilateral;tricep extension, bilateral  -RB      Upper Extremity Range of Motion (Therapeutic Exercise)  shoulder flexion/extension, bilateral;elbow flexion/extension, bilateral  -RB      Exercise Type (Therapeutic Exercise)  resistive exercises  -RB      Position (Therapeutic Exercise)  seated  -RB      Sets/Reps (Therapeutic Exercise)  2 x 10  -RB      Equipment (Therapeutic Exercise)  dowel rosa maria/wand;resistive tubing  -RB      Expected Outcome (Therapeutic Exercise)  improve functional stability;improve functional tolerance, self-care activity;increase active range of motion;improve performance, transfer skills  -RB      Recorded by [RB] Nathan Casey OT 08/17/20 7527      Row Name 08/17/20 1454             Positioning and Restraints    Pre-Treatment Position  in bed  -RB      Post Treatment Position  bed  -RB      In Bed  supine;call light within reach;exit alarm on;encouraged to call for assist  -RB      Recorded by [RB] Nathan Casey OT 08/17/20 9947      Row Name 08/17/20 1454             Pain Scale: Numbers Pre/Post-Treatment    Pain Scale: Numbers, Pretreatment  6/10  -RB      Pain Scale: Numbers, Post-Treatment  6/10  -RB      Pain Location - Side  Bilateral  -RB2      Pain Location - Orientation  lower  -RB2      Pain Location  extremity  -RB2      Pain Intervention(s)  Medication (See MAR)  -RB      Recorded by [RB] Nathan Casey OT 08/17/20 6434  [RB2] Nathan Casey OT 08/17/20 1505      Row Name 08/17/20 1454             Sensory Assessment/Intervention    Sensory General Assessment  --  -RB      Recorded by [RB] Jacinto  Nathan, OT 08/17/20 1557      Row Name                Wound 08/14/20 2235 Left upper arm MASD (Moisture associated skin damage)    Wound - Properties Group Date first assessed: 08/14/20 [KH] Time first assessed: 2235 [KH] Present on Hospital Admission: Y [KH] Side: Left [KH] Orientation: upper [KH] Location: arm [KH], arm pit  Primary Wound Type: MASD [KH] Recorded by:  [KH] Magi Russ RN 08/14/20 2328    Row Name                Wound Left breast MASD (Moisture associated skin damage)    Wound - Properties Group Side: Left [KH] Location: breast [KH], under  Primary Wound Type: MASD [KH] Recorded by:  [FRANCE] Magi Russ RN 08/14/20 2331    Row Name                Wound 08/14/20 2235 Left groin MASD (Moisture associated skin damage)    Wound - Properties Group Date first assessed: 08/14/20 [KH] Time first assessed: 2235 [KH] Present on Hospital Admission: Y [KH] Side: Left [KH] Location: groin [KH] Primary Wound Type: MASD [KH] Recorded by:  [KH] Magi Russ RN 08/14/20 2333    Row Name                Wound 08/14/20 2235 Right groin MASD (Moisture associated skin damage)    Wound - Properties Group Date first assessed: 08/14/20 [KH] Time first assessed: 2235 [KH] Present on Hospital Admission: Y [KH] Side: Right [KH] Location: groin [KH] Primary Wound Type: MASD [KH] Recorded by:  [FRANCE] Magi Russ RN 08/14/20 2335    Row Name 08/17/20 1454             Outcome Summary/Treatment Plan (OT)    Daily Summary of Progress (OT)  progress toward functional goals is good  -RB      Plan for Continued Treatment (OT)  Cont OT POC.  -RB      Anticipated Discharge Disposition (OT)  home with 24/7 care;home with home health;home with assist  -RB2      Recorded by [RB] Nathan Casey, OT 08/17/20 1557  [RB2] Nathan Casey, OT 08/17/20 1505        User Key  (r) = Recorded By, (t) = Taken By, (c) = Cosigned By    Initials Name Effective Dates Discipline    RB Dejon Caseyald, OT 07/24/19 -  OT    Magi Wisdom, PATI 05/29/19 -   Nurse        Wound 08/14/20 2235 Left upper arm MASD (Moisture associated skin damage) (Active)   Dressing Appearance open to air 8/17/2020  8:33 AM   Closure Open to air 8/16/2020  9:39 PM   Base moist;red 8/16/2020  9:39 PM   Periwound intact 8/16/2020  9:39 PM   Periwound Temperature warm 8/16/2020  9:39 PM   Periwound Skin Turgor soft 8/16/2020  9:39 PM   Drainage Amount none 8/16/2020  9:39 PM       Wound Left breast MASD (Moisture associated skin damage) (Active)   Dressing Appearance no drainage;open to air 8/16/2020  9:39 PM   Closure Open to air 8/17/2020  8:33 AM   Base red;moist 8/16/2020  9:39 PM   Periwound intact;redness 8/16/2020  9:39 PM   Periwound Temperature warm 8/16/2020  9:39 PM   Periwound Skin Turgor soft 8/16/2020  9:39 PM   Drainage Amount none 8/16/2020  9:39 PM       Wound 08/14/20 2235 Left groin MASD (Moisture associated skin damage) (Active)   Dressing Appearance open to air 8/17/2020  8:33 AM   Closure Open to air 8/16/2020  9:39 PM   Base red;moist 8/16/2020  9:39 PM   Periwound intact;warm 8/16/2020  9:39 PM   Periwound Temperature warm 8/16/2020  9:39 PM   Periwound Skin Turgor soft 8/16/2020  9:39 PM       Wound 08/14/20 2235 Right groin MASD (Moisture associated skin damage) (Active)   Dressing Appearance moist drainage;open to air 8/16/2020  9:39 PM   Closure Open to air 8/17/2020  8:33 AM   Base pink;red 8/16/2020  9:39 PM   Periwound intact 8/16/2020  9:39 PM   Periwound Temperature warm 8/16/2020  9:39 PM   Periwound Skin Turgor soft 8/16/2020  9:39 PM   Drainage Amount none 8/16/2020  9:39 PM       Occupational Therapy Education                 Title: PT OT SLP Therapies (In Progress)     Topic: Occupational Therapy (In Progress)     Point: ADL training (Done)     Description:   Instruct learner(s) on proper safety adaptation and remediation techniques during self care or transfers.   Instruct in proper use of assistive devices.              Learning Progress Summary            Patient Acceptance, E, VU by RB at 8/17/2020 1558    Comment:  Edu on use of sock aide and reacher to don socks and pants.                   Point: Home exercise program (Not Started)     Description:   Instruct learner(s) on appropriate technique for monitoring, assisting and/or progressing therapeutic exercises/activities.              Learner Progress:   Not documented in this visit.          Point: Precautions (Done)     Description:   Instruct learner(s) on prescribed precautions during self-care and functional transfers.              Learning Progress Summary           Patient Acceptance, E, VU by RB at 8/15/2020 1648    Comment:  Edu pt on use of gait belt and non skid socks when OOB and no OOB without assist.                   Point: Body mechanics (Not Started)     Description:   Instruct learner(s) on proper positioning and spine alignment during self-care, functional mobility activities and/or exercises.              Learner Progress:   Not documented in this visit.                      User Key     Initials Effective Dates Name Provider Type Discipline     07/24/19 -  Nathan Casey, OT Occupational Therapist OT                OT Recommendation and Plan  Outcome Summary/Treatment Plan (OT)  Daily Summary of Progress (OT): progress toward functional goals is good  Plan for Continued Treatment (OT): Cont OT POC.  Anticipated Discharge Disposition (OT): home with 24/7 care, home with home health, home with assist  Planned Therapy Interventions (OT Eval): activity tolerance training, adaptive equipment training, BADL retraining, functional balance retraining, occupation/activity based interventions, patient/caregiver education/training, ROM/therapeutic exercise, strengthening exercise, transfer/mobility retraining  Therapy Frequency (OT Eval): other (see comments)(5-7 days/wk.)  Daily Summary of Progress (OT): progress toward functional goals is good  Plan of Care Review  Plan of Care Reviewed With:  patient  Plan of Care Reviewed With: patient  Outcome Summary: OT tx on this date.  Pt participated in UB therex with 3 lb dowel rosa maria 2 x 10 reps.  She also participated in LB dressing activity to don pants and socks with CGA.  Good progress - No goals met. Will continue with POC.  Outcome Measures     Row Name 08/17/20 1454 08/15/20 1600 08/15/20 1350       How much help from another person do you currently need...    Turning from your back to your side while in flat bed without using bedrails?  --  3  -GB  --    Moving from lying on back to sitting on the side of a flat bed without bedrails?  --  3  -GB  --    Moving to and from a bed to a chair (including a wheelchair)?  --  3  -GB  --    Standing up from a chair using your arms (e.g., wheelchair, bedside chair)?  --  3  -GB  --    Climbing 3-5 steps with a railing?  --  2  -GB  --    To walk in hospital room?  --  3  -GB  --    AM-PAC 6 Clicks Score (PT)  --  17  -GB  --       How much help from another is currently needed...    Putting on and taking off regular lower body clothing?  3  -RB  --  2  -RB    Bathing (including washing, rinsing, and drying)  2  -RB  --  2  -RB    Toileting (which includes using toilet bed pan or urinal)  3  -RB  --  3  -RB    Putting on and taking off regular upper body clothing  3  -RB  --  3  -RB    Taking care of personal grooming (such as brushing teeth)  4  -RB  --  4  -RB    Eating meals  4  -RB  --  4  -RB    AM-PAC 6 Clicks Score (OT)  19  -RB  --  18  -RB       Functional Assessment    Outcome Measure Options  --  AM-PAC 6 Clicks Basic Mobility (PT)  -GB  AM-PAC 6 Clicks Daily Activity (OT)  -RB      User Key  (r) = Recorded By, (t) = Taken By, (c) = Cosigned By    Initials Name Provider Type    RB Nathan Casey, OT Occupational Therapist    Any Mckeon, PT Physical Therapist           Time Calculation:   Time Calculation- OT     Row Name 08/17/20 1604             Time Calculation- OT    OT Start Time  7024   -RB      OT Stop Time  1538  -RB      OT Time Calculation (min)  44 min  -RB      OT Received On  08/17/20  -RB        User Key  (r) = Recorded By, (t) = Taken By, (c) = Cosigned By    Initials Name Provider Type    Nathan Smith OT Occupational Therapist        Therapy Charges for Today     Code Description Service Date Service Provider Modifiers Qty    80355987950 HC OT SELF CARE/MGMT/TRAIN EA 15 MIN 8/17/2020 Nathan Casey OT GO 2    42924172251 HC OT MANUAL THERAPY EA 15 MIN 8/17/2020 Nathan Casey OT GO 1               Nathan Casey OT  8/17/2020

## 2020-08-17 NOTE — THERAPY TREATMENT NOTE
Acute Care - Physical Therapy Treatment Note  [facility]     Patient Name: Lili Arguello  : 1950  MRN: [id number]  Today's Date: 2020  Onset of Illness/Injury or Date of Surgery: 20     Referring Physician: FELICITAS Long MD    Admit Date: 2020    Visit Dx:    ICD-10-CM ICD-9-CM   1. ADRIANNA (acute kidney injury) (CMS/HCC) N17.9 584.9   2. Urinary tract infection without hematuria, site unspecified N39.0 599.0   3. Speech disturbance, unspecified type R47.9 784.59   4. Fall, initial encounter W19.XXXA E888.9   5. Impaired functional mobility, balance, gait, and endurance Z74.09 V49.89   6. Impaired mobility and activities of daily living Z74.09 V49.89    Z78.9      Patient Active Problem List   Diagnosis   • Dyspnea on exertion   • Hypertension   • GERD (gastroesophageal reflux disease)   • Morbid obesity (CMS/[facility])   • Diabetes mellitus (CMS/[facility])   • LVH (left ventricular hypertrophy)   • Diastolic dysfunction   • Viral respiratory illness   • Gout   • History of gastric ulcer   • Insomnia   • Low back pain   • Lower extremity edema   • Neuropathy   • Restless leg syndrome   • Epigastric pain   • Aphasia   • Recurrent falls   • Altered mental status   • ADRIANNA (acute kidney injury) (CMS/HCC)       Therapy Treatment    Rehabilitation Treatment Summary     Row Name 20 1454 20 1351          Treatment Time/Intention    Discipline  occupational therapist  -RB  physical therapy assistant  -TA     Document Type  therapy note (daily note)  -RB  therapy note (daily note)  -TA     Subjective Information  --  complains of;pain  -TA     Mode of Treatment  occupational therapy  -RB2  physical therapy  -TA     Therapy Frequency (PT Clinical Impression)  --  other (see comments) 6 d/ wk  -TA     Therapy Frequency (OT Eval)  other (see comments) 5-7 days/wk.  -RB  --     Patient Effort  excellent  -RB  excellent  -TA     Existing Precautions/Restrictions  fall  -RB  fall  -TA     Patient  Response to Treatment  --  pt declined walking boot for R LE, she states she will not be wearing it @ home  -TA     Recorded by [RB] Nathan Casey, OT 08/17/20 1505  [RB2] Nathan Casey, OT 08/17/20 1557 [TA] Kathi Martínez, PTA 08/17/20 1623     Row Name 08/17/20 1454 08/17/20 1351          Vital Signs    Pre Systolic BP Rehab  115  -RB  125  -TA     Pre Treatment Diastolic BP  56  -RB  77  -TA     Post Systolic BP Rehab  152  -RB2  138  -TA     Post Treatment Diastolic BP  76  -RB2  77  -TA     Pretreatment Heart Rate (beats/min)  71  -RB  64  -TA     Intratreatment Heart Rate (beats/min)  --  80  -TA     Posttreatment Heart Rate (beats/min)  72  -RB2  62  -TA     Pre SpO2 (%)  96  -RB2  94  -TA     O2 Delivery Pre Treatment  room air  -RB2  room air  -TA     Intra SpO2 (%)  96  -RB2  95  -TA     O2 Delivery Intra Treatment  room air  -RB2  room air  -TA     Post SpO2 (%)  97  -RB2  96  -TA     O2 Delivery Post Treatment  room air  -RB2  room air  -TA     Pre Patient Position  Supine  -RB3  Supine  -TA     Intra Patient Position  Standing  -RB3  --     Post Patient Position  Supine  -RB3  Supine  -TA     Recorded by [RB] Nathan Casey, OT 08/17/20 1505  [RB2] Nathan Casey, OT 08/17/20 1535  [RB3] Nathan Casey, OT 08/17/20 1557 [TA] Kathi Martínez, PTA 08/17/20 1623     Row Name 08/17/20 1454 08/17/20 1351          Cognitive Assessment/Intervention- PT/OT    Orientation Status (Cognition)  oriented x 4  -RB  oriented x 4  -TA     Personal Safety Interventions  --  fall prevention program maintained;gait belt;nonskid shoes/slippers when out of bed;supervised activity  -TA     Recorded by [RB] Nathan Casey, OT 08/17/20 1505 [TA] Kathi Martínez, PTA 08/17/20 1623     Row Name 08/17/20 1454 08/17/20 1351          Mobility Assessment/Intervention    Extremity Weight-bearing Status  right lower extremity  -RB  right lower extremity  -TA     Left Lower Extremity (Weight-bearing Status)  weight-bearing as tolerated  (WBAT)  -RB  --     Right Lower Extremity (Weight-bearing Status)  other (see comments) C/O ankle pain.  -RB  weight-bearing as tolerated (WBAT) however c/o R ankle pain w/ gait so will need as delma  -TA     Recorded by [RB] Nathan Casey, OT 08/17/20 1557 [TA] Kathi Martínez, PTA 08/17/20 1623     Row Name 08/17/20 1454 08/17/20 1351          Bed Mobility Assessment/Treatment    Bed Mobility Assessment/Treatment  bed mobility (all) activities  -RB  bed mobility (all) activities  -TA     [place] Level (Bed Mobility)  supervision  -RB2  conditional independence;supervision;contact guard assist  -TA     Bed Mobility, Safety Issues  --  -RB2  --  -TA     Assistive Device (Bed Mobility)  bed rails;head of bed elevated  -RB2  bed rails;draw sheet;head of bed elevated  -TA     Recorded by [RB] Nathan Casey, OT 08/17/20 1505  [RB2] Nathan Casey, OT 08/17/20 1557 [TA] Kathi Martínez, PTA 08/17/20 1623     Row Name 08/17/20 1454 08/17/20 1351          Functional Mobility    Functional Mobility- Ind. Level  --  -RB  contact guard assist;supervision required  -TA     Functional Mobility- Device  --  -RB  rolling walker  -TA     Functional Mobility-Distance (Feet)  --  8 x 2  -TA     Recorded by [RB] Nathan Casey, OT 08/17/20 1557 [TA] Kathi Martínez, PTA 08/17/20 1623     Row Name 08/17/20 1454             Transfer Assessment/Treatment    Transfer Assessment/Treatment  --  -RB      Recorded by [RB] Nathan Casey, OT 08/17/20 1557      Row Name 08/17/20 1454 08/17/20 1351          Sit-Stand Transfer    Sit-Stand [place] (Transfers)  --  -RB  stand by assist;verbal cues  -TA     Assistive Device (Sit-Stand Transfers)  --  -RB  walker, front-wheeled  -TA     Recorded by [RB] Nathan Casey, OT 08/17/20 1557 [TA] Kathi Martínez, PTA 08/17/20 1623     Row Name 08/17/20 1454 08/17/20 135          Stand-Sit Transfer    Stand-Sit [place] (Transfers)  --  -RB  stand by assist;verbal cues  -TA     Assistive Device  (Stand-Sit Transfers)  --  -RB  walker, front-wheeled  -TA     Recorded by [RB] Nathan Casey, OT 08/17/20 1557 [TA] Kathi Martínez, PTA 08/17/20 1623     Row Name 08/17/20 1454 08/17/20 1351          Toilet Transfer    Type (Toilet Transfer)  --  -RB  sit-stand;stand-sit  -TA     [place] Level (Toilet Transfer)  --  -RB  stand by assist;verbal cues  -TA     Assistive Device (Toilet Transfer)  --  -RB  walker, front-wheeled  -TA     Recorded by [RB] Nathan Casey, OT 08/17/20 1557 [TA] Kathi Martínez, [facility] 08/17/20 1623     Row Name 08/17/20 1351             Gait/Stairs Assessment/Training    [place] Level (Gait)  supervision;contact guard;1 person assist;1 person to manage equipment  -TA      Assistive Device (Gait)  walker, front-wheeled  -TA      Distance in Feet (Gait)  120`  -TA      Pattern (Gait)  step-through  -TA      Recorded by [TA] Kathi Martínez, PTA 08/17/20 1623      Row Name 08/17/20 1454             Lower Body Dressing Assessment/Training    Lower Body Dressing [place] Level  lower body dressing skills;doff;socks;pants/bottoms;contact guard assist  -RB      Lower Body Dressing Position  edge of bed sitting  -RB2      Recorded by [RB] Nathan Casey, OT 08/17/20 1557  [RB2] Nathan Casey, OT 08/17/20 1505      Row Name 08/17/20 1454             Toileting Assessment/Training    [place] Level (Toileting)  --  -RB      Toileting Position  --  -RB      Recorded by [RB] Nathan Casey, OT 08/17/20 1557      Row Name 08/17/20 1454             Motor Skills Assessment/Interventions    Additional Documentation  Therapeutic Exercise (Group);Therapeutic Exercise Interventions (Group)  -RB      Recorded by [RB] Nathan Casey, OT 08/17/20 1557      Row Name 08/17/20 1454 08/17/20 1351          Therapeutic Exercise    Upper Extremity (Therapeutic Exercise)  bicep curl, bilateral;tricep extension, bilateral  -RB  --     Upper Extremity Range of Motion (Therapeutic Exercise)  shoulder  flexion/extension, bilateral;elbow flexion/extension, bilateral  -RB  --     Lower Extremity (Therapeutic Exercise)  --  LAQ (long arc quad), bilateral;marching while seated  -TA     Lower Extremity Range of Motion (Therapeutic Exercise)  --  ankle dorsiflexion/plantar flexion, bilateral;hip abduction/adduction, bilateral  -TA     Exercise Type (Therapeutic Exercise)  resistive exercises  -RB  AROM (active range of motion)  -TA     Position (Therapeutic Exercise)  seated  -RB  seated  -TA     Sets/Reps (Therapeutic Exercise)  2 x 10  -RB  20  -TA     Equipment (Therapeutic Exercise)  dowel rosa maria/wand;resistive tubing  -RB  --     Expected Outcome (Therapeutic Exercise)  improve functional stability;improve functional tolerance, self-care activity;increase active range of motion;improve performance, transfer skills  -RB  facilitate normal movement patterns;improve functional stability;improve motor control;increase active range of motion  -TA     Recorded by [RB] Nathan Casey, OT 08/17/20 1557 [TA] Kathi Martínez, PTA 08/17/20 1623     Row Name 08/17/20 145 08/17/20 1351          Positioning and Restraints    Pre-Treatment Position  in bed  -RB  in bed  -TA     Post Treatment Position  bed  -RB  bed  -TA     In Bed  supine;call light within reach;exit alarm on;encouraged to call for assist  -RB  supine;call light within reach;exit alarm on  -TA     Recorded by [RB] Nathan Casey, ALEXANDRE 08/17/20 1557 [TA] Kathi Martínez, PTA 08/17/20 1623     Row Name 08/17/20 1454 08/17/20 1351          Pain Scale: Numbers Pre/Post-Treatment    Pain Scale: Numbers, Pretreatment  6/10  -RB  7/10  -TA     Pain Scale: Numbers, Post-Treatment  6/10  -RB  7/10  -TA     Pain Location - Side  Bilateral  -RB2  --  -TA     Pain Location - Orientation  lower  -RB2  generalized  -TA     Pain Location  extremity  -RB2  --  -TA     Pain Intervention(s)  Medication (See MAR)  -RB  --     Recorded by [RB] Nathan Casey, OT 08/17/20 1557  [RB2]  Nathan Casey, OT 08/17/20 1505 [TA] NathaliaonNuKathi M, PTA 08/17/20 1623     Row Name 08/17/20 1454 08/17/20 1351          Sensory Assessment/Intervention    Sensory General Assessment  --  -RB  --  -TA     Recorded by [RB] Nathan Casey, OT 08/17/20 1557 [TA] Mauricio Kathi M, PTA 08/17/20 1623     Row Name                Wound 08/14/20 2235 Left upper arm MASD (Moisture associated skin damage)    Wound - Properties Group Date first assessed: 08/14/20 [KH] Time first assessed: 2235 [KH] Present on Hospital Admission: Y [KH] Side: Left [KH] Orientation: upper [KH] Location: arm [KH], arm pit  Primary Wound Type: MASD [KH] Recorded by:  [FRANCE] Magi Russ RN 08/14/20 2328    Row Name                Wound Left breast MASD (Moisture associated skin damage)    Wound - Properties Group Side: Left [KH] Location: breast [KH], under  Primary Wound Type: MASD [KH] Recorded by:  [FRANCE] Magi Russ RN 08/14/20 2331    Row Name                Wound 08/14/20 2235 Left groin MASD (Moisture associated skin damage)    Wound - Properties Group Date first assessed: 08/14/20 [KH] Time first assessed: 2235 [KH] Present on Hospital Admission: Y [KH] Side: Left [KH] Location: groin [KH] Primary Wound Type: MASD [KH] Recorded by:  [FRANCE] Magi Russ RN 08/14/20 2333    Row Name                Wound 08/14/20 2235 Right groin MASD (Moisture associated skin damage)    Wound - Properties Group Date first assessed: 08/14/20 [KH] Time first assessed: 2235 [KH] Present on Hospital Admission: Y [KH] Side: Right [KH] Location: groin [KH] Primary Wound Type: MASD [KH] Recorded by:  [FRANCE] Magi Russ RN 08/14/20 2335    Row Name 08/17/20 1454             Outcome Summary/Treatment Plan (OT)    Daily Summary of Progress (OT)  progress toward functional goals is good  -RB      Plan for Continued Treatment (OT)  Cont OT POC.  -RB      Anticipated Discharge Disposition (OT)  home with 24/7 care;home with home health;home with assist  -RB2      Recorded by  [RB] Nathan Casey, OT 08/17/20 1557  [RB2] Nathan Casey, OT 08/17/20 1505      Row Name 08/17/20 1351             Outcome Summary/Treatment Plan (PT)    Daily Summary of Progress (PT)  progress toward functional goals is good  -TA      Plan for Continued Treatment (PT)  continue  -TA      Anticipated Discharge Disposition (PT)  home with home health;home with 24/7 care  -TA      Recorded by [TA] Kathi Martínez, PTA 08/17/20 1623        User Key  (r) = Recorded By, (t) = Taken By, (c) = Cosigned By    Initials Name Effective Dates Discipline    RB Nathan Casey, OT 07/24/19 -  OT    TA Kathi Martínez, PTA 03/07/18 -  PT    KH Magi Russ, RN 05/29/19 -  Nurse          Wound 08/14/20 2235 Left upper arm MASD (Moisture associated skin damage) (Active)   Dressing Appearance open to air 8/17/2020  8:33 AM   Closure Open to air 8/16/2020  9:39 PM   Base moist;red 8/16/2020  9:39 PM   Periwound intact 8/16/2020  9:39 PM   Periwound Temperature warm 8/16/2020  9:39 PM   Periwound Skin Turgor soft 8/16/2020  9:39 PM   Drainage Amount none 8/16/2020  9:39 PM       Wound Left breast MASD (Moisture associated skin damage) (Active)   Dressing Appearance no drainage;open to air 8/16/2020  9:39 PM   Closure Open to air 8/17/2020  8:33 AM   Base red;moist 8/16/2020  9:39 PM   Periwound intact;redness 8/16/2020  9:39 PM   Periwound Temperature warm 8/16/2020  9:39 PM   Periwound Skin Turgor soft 8/16/2020  9:39 PM   Drainage Amount none 8/16/2020  9:39 PM       Wound 08/14/20 2235 Left groin MASD (Moisture associated skin damage) (Active)   Dressing Appearance open to air 8/17/2020  8:33 AM   Closure Open to air 8/16/2020  9:39 PM   Base red;moist 8/16/2020  9:39 PM   Periwound intact;warm 8/16/2020  9:39 PM   Periwound Temperature warm 8/16/2020  9:39 PM   Periwound Skin Turgor soft 8/16/2020  9:39 PM       Wound 08/14/20 4964 Right groin MASD (Moisture associated skin damage) (Active)   Dressing Appearance moist drainage;open  to air 8/16/2020  9:39 PM   Closure Open to air 8/17/2020  8:33 AM   Base pink;red 8/16/2020  9:39 PM   Periwound intact 8/16/2020  9:39 PM   Periwound Temperature warm 8/16/2020  9:39 PM   Periwound Skin Turgor soft 8/16/2020  9:39 PM   Drainage Amount none 8/16/2020  9:39 PM       Rehab Goal Summary     Row Name 08/17/20 1454 08/17/20 1351          Physical Therapy Goals    Bed Mobility Goal Selection (PT)  --  bed mobility, PT goal 1  -TA     Transfer Goal Selection (PT)  --  transfer, PT goal 1  -TA     Gait Training Goal Selection (PT)  --  gait training, PT goal 1  -TA     Stairs Goal Selection (PT)  --  stairs, PT goal 1  -TA        Bed Mobility Goal 1 (PT)    Activity/Assistive Device (Bed Mobility Goal 1, PT)  --  bed mobility activities, all  -TA     [place] Level/Cues Needed (Bed Mobility Goal 1, PT)  --  independent  -TA     Time Frame (Bed Mobility Goal 1, PT)  --  3 days  -TA     Progress/Outcomes (Bed Mobility Goal 1, PT)  --  goal not met  -TA        Transfer Goal 1 (PT)    Activity/Assistive Device (Transfer Goal 1, PT)  --  bed-to-chair/chair-to-bed  -TA     [place] Level/Cues Needed (Transfer Goal 1, PT)  --  independent;conditional independence  -TA     Time Frame (Transfer Goal 1, PT)  --  3 days  -TA     Progress/Outcome (Transfer Goal 1, PT)  --  continuing progress toward goal;goal not met  -TA        Gait Training Goal 1 (PT)    Activity/Assistive Device (Gait Training Goal 1, PT)  --  gait (walking locomotion);assistive device use;decrease fall risk  -TA     [place] Level (Gait Training Goal 1, PT)  --  independent;conditional independence  -TA     Distance (Gait Goal 1, PT)  --  500 ft or more w/ VSS  -TA     Time Frame (Gait Training Goal 1, PT)  --  by discharge  -TA     Barriers (Gait Training Goal 1, PT)  --  ex delma;confusion  -TA     Progress/Outcome (Gait Training Goal 1, PT)  --  goal not met  -TA        Stairs Goal 1 (PT)    Activity/Assistive Device (Stairs Goal 1,  PT)  --  ascending stairs;descending stairs  -TA     [place] Level/Cues Needed (Stairs Goal 1, PT)  --  conditional independence;independent  -TA     Number of Stairs (Stairs Goal 1, PT)  --  1 or more  -TA     Time Frame (Stairs Goal 1, PT)  --  by discharge  -TA     Progress/Outcome (Stairs Goal 1, PT)  --  goal not met  -TA        Occupational Therapy Goals    Bed Mobility Goal Selection (OT)  bed mobility, OT goal 1  -RB  --     Transfer Goal Selection (OT)  transfer, OT goal 1  -RB  --     Bathing Goal Selection (OT)  bathing, OT goal 1  -RB  --     Dressing Goal Selection (OT)  dressing, OT goal 1  -RB  --     Toileting Goal Selection (OT)  toileting, OT goal 1  -RB  --     Activity Tolerance Goal Selection (OT)  activity tolerance, OT goal 1  -RB  --        Bed Mobility Goal 1 (OT)    Activity/Assistive Device (Bed Mobility Goal 1, OT)  bed mobility activities, all  -RB  --     [place] Level/Cues Needed (Bed Mobility Goal 1, OT)  conditional independence  -RB  --     Time Frame (Bed Mobility Goal 1, OT)  long term goal (LTG)  -RB  --     Progress/Outcomes (Bed Mobility Goal 1, OT)  goal not met  -RB  --        Transfer Goal 1 (OT)    Activity/Assistive Device (Transfer Goal 1, OT)  transfers, all  -RB  --     [place] Level/Cues Needed (Transfer Goal 1, OT)  conditional independence  -RB  --     Time Frame (Transfer Goal 1, OT)  long term goal (LTG)  -RB  --     Progress/Outcome (Transfer Goal 1, OT)  goal not met  -RB  --        Bathing Goal 1 (OT)    Activity/Assistive Device (Bathing Goal 1, OT)  bathing skills, all  -RB  --     [place] Level/Cues Needed (Bathing Goal 1, OT)  contact guard assist  -RB  --     Time Frame (Bathing Goal 1, OT)  long term goal (LTG)  -RB  --     Progress/Outcomes (Bathing Goal 1, OT)  goal not met  -RB  --        Dressing Goal 1 (OT)    Activity/Assistive Device (Dressing Goal 1, OT)  dressing skills, all  -RB  --     [place]/Cues Needed (Dressing Goal  1, OT)  supervision required  -RB  --     Time Frame (Dressing Goal 1, OT)  long term goal (LTG)  -RB  --     Progress/Outcome (Dressing Goal 1, OT)  goal not met  -RB  --        Toileting Goal 1 (OT)    Activity/Device (Toileting Goal 1, OT)  toileting skills, all  -RB  --     [place] Level/Cues Needed (Toileting Goal 1, OT)  conditional independence  -RB  --     Time Frame (Toileting Goal 1, OT)  long term goal (LTG)  -RB  --     Progress/Outcome (Toileting Goal 1, OT)  goal not met  -RB  --         Activity Tolerance Goal 1 (OT)    Activity Level (Endurance Goal 1, OT)  15 min activity  -RB  --     Progress/Outcome (Activity Tolerance Goal 1, OT)  goal met  -RB  --       User Key  (r) = Recorded By, (t) = Taken By, (c) = Cosigned By    Initials Name Provider Type Discipline    Nathan Smith, OT Occupational Therapist OT    Kathi Law, PTA Physical Therapy Assistant PT          Physical Therapy Education                 Title: PT OT SLP Therapies (In Progress)     Topic: Physical Therapy (In Progress)     Point: Mobility training (In Progress)     Description:   Instruct learner(s) on safety and technique for assisting patient out of bed, chair or wheelchair.  Instruct in the proper use of assistive devices, such as walker, crutches, cane or brace.              Patient Friendly Description:   It's important to get you on your feet again, but we need to do so in a way that is safe for you. Falling has serious consequences, and your personal safety is the most important thing of all.        When it's time to get out of bed, one of us or a family member will sit next to you on the bed to give you support.     If your doctor or nurse tells you to use a walker, crutches, a cane, or a brace, be sure you use it every time you get out of bed, even if you think you don't need it.    Learning Progress Summary           Patient Acceptance, D,E, NR by OZ at 8/15/2020 0715    Comment:  POC; mobilty                    Point: Home exercise program (In Progress)     Description:   Instruct learner(s) on appropriate technique for monitoring, assisting and/or progressing patient with therapeutic exercises and activities.              Learning Progress Summary           Patient Acceptance, D,E, NR by  at 8/15/2020 1635    Comment:  POC; mobilty                   Point: Body mechanics (In Progress)     Description:   Instruct learner(s) on proper positioning and spine alignment for patient and/or caregiver during mobility tasks and/or exercises.              Learning Progress Summary           Patient Acceptance, D,E, NR by OZ at 8/15/2020 1635    Comment:  POC; mobilty                   Point: Precautions (In Progress)     Description:   Instruct learner(s) on prescribed precautions during mobility and gait tasks              Learning Progress Summary           Patient Acceptance, D,E, NR by OZ at 8/15/2020 1635    Comment:  POC; mobilty                               User Key     Initials Effective Dates Name Provider Type Discipline     04/03/18 -  Any Dow, PT Physical Therapist PT                PT Recommendation and Plan  Anticipated Discharge Disposition (PT): home with home health, home with 24/7 care  Therapy Frequency (PT Clinical Impression): other (see comments)(6 d/ wk)  Outcome Summary/Treatment Plan (PT)  Daily Summary of Progress (PT): progress toward functional goals is good  Plan for Continued Treatment (PT): continue  Anticipated Discharge Disposition (PT): home with home health, home with 24/7 care  Outcome Summary: pt sup<>sit with independence, sit<>stand with SBA, pt ambulated 120` with RW & CGA-SBA. pt would benefit from HHPT @ D/c  Outcome Measures     Row Name 08/17/20 1600 08/17/20 1454 08/15/20 1600       How much help from another person do you currently need...    Turning from your back to your side while in flat bed without using bedrails?  4  -TA  --  3  -GB    Moving from lying  on back to sitting on the side of a flat bed without bedrails?  4  -TA  --  3  -GB    Moving to and from a bed to a chair (including a wheelchair)?  3  -TA  --  3  -GB    Standing up from a chair using your arms (e.g., wheelchair, bedside chair)?  3  -TA  --  3  -GB    Climbing 3-5 steps with a railing?  2  -TA  --  2  -GB    To walk in hospital room?  3  -TA  --  3  -GB    AM-PAC 6 Clicks Score (PT)  19  -TA  --  17  -GB       How much help from another is currently needed...    Putting on and taking off regular lower body clothing?  --  3  -RB  --    Bathing (including washing, rinsing, and drying)  --  2  -RB  --    Toileting (which includes using toilet bed pan or urinal)  --  3  -RB  --    Putting on and taking off regular upper body clothing  --  3  -RB  --    Taking care of personal grooming (such as brushing teeth)  --  4  -RB  --    Eating meals  --  4  -RB  --    AM-PAC 6 Clicks Score (OT)  --  19  -RB  --       Functional Assessment    Outcome Measure Options  AM-PAC 6 Clicks Basic Mobility (PT)  -TA  --  AM-PAC 6 Clicks Basic Mobility (PT)  -GB    Row Name 08/15/20 1350             How much help from another is currently needed...    Putting on and taking off regular lower body clothing?  2  -RB      Bathing (including washing, rinsing, and drying)  2  -RB      Toileting (which includes using toilet bed pan or urinal)  3  -RB      Putting on and taking off regular upper body clothing  3  -RB      Taking care of personal grooming (such as brushing teeth)  4  -RB      Eating meals  4  -RB      AM-PAC 6 Clicks Score (OT)  18  -RB         Functional Assessment    Outcome Measure Options  AM-PAC 6 Clicks Daily Activity (OT)  -RB        User Key  (r) = Recorded By, (t) = Taken By, (c) = Cosigned By    Initials Name Provider Type    RB Nathan Casey, OT Occupational Therapist    Any Mckeon, PT Physical Therapist    TA Kathi Martínez, PTA Physical Therapy Assistant         Time Calculation:    PT Charges     Row Name 08/17/20 1629             Time Calculation    Start Time  1351  -TA      Stop Time  1429  -TA      Time Calculation (min)  38 min  -TA      PT Received On  08/17/20  -TA         Time Calculation- PT    Total Timed Code Minutes- PT  38 minute(s)  -TA        User Key  (r) = Recorded By, (t) = Taken By, (c) = Cosigned By    Initials Name Provider Type    Kathi Law PTA Physical Therapy Assistant        Therapy Charges for Today     Code Description Service Date Service Provider Modifiers Qty    90890504910 HC GAIT TRAINING EA 15 MIN 8/17/2020 Kathi Martínez, PTA GP 1    [id number] HC PT THER PROC EA 15 MIN 8/17/2020 Kathi Martínez, PTA GP 1    [id number] HC PT THERAPEUTIC ACT EA 15 MIN 8/17/2020 Kathi Martínez, PTA GP 1          PT G-Codes  Outcome Measure Options: AM-PAC 6 Clicks Basic Mobility (PT)  AM-PAC 6 Clicks Score (PT): 19  AM-PAC 6 Clicks Score (OT): 19    Kathi Martínez PTA  8/17/2020

## 2020-08-17 NOTE — PLAN OF CARE
Problem: Patient Care Overview  Goal: Plan of Care Review  Outcome: Ongoing (interventions implemented as appropriate)  Flowsheets (Taken 8/17/2020 1716)  Outcome Summary: pt sup<>sit with independence, sit<>stand with SBA, pt ambulated 120` with RW & CGA-SBA. pt would benefit from HHPT @ D/c

## 2020-08-17 NOTE — PROGRESS NOTES
"[facility]  [address] [place] [address]. [address]  T - [phone]  F - [phone]     Progress Note          PATIENT  DEMOGRAPHICS   PATIENT NAME: Lili Arguello                      PHYSICIAN: Dameon Ch Jr, MD  : 1950  MRN: [id number]   LOS: 3 days    Patient Care Team:  Sandhya Thomas,  as PCP - General  Subjective   SUBJECTIVE   Patient awake and in no acute distress.feels well        Objective   OBJECTIVE   Vital Signs  Temp:  [96.3 °F (35.7 °C)-98.2 °F (36.8 °C)] 96.3 °F (35.7 °C)  Heart Rate:  [76-95] 76  Resp:  [18-20] 18  BP: (136-142)/(62-74) 137/65    Flowsheet Rows      First Filed Value   Admission Height  160 cm (63\") Documented at 2020   Admission Weight  107 kg (235 lb) Documented at 2020           I/O last 3 completed shifts:  In: 360 [P.O.:360]  Out: 1700 [Urine:1700]    PHYSICAL EXAM    Physical Exam   Constitutional: She is oriented to person, place, and time. She appears well-developed and well-nourished.   HENT:   Head: Normocephalic and atraumatic.   Eyes: Pupils are equal, round, and reactive to light.   Cardiovascular: Normal rate, regular rhythm and normal heart sounds.   Pulmonary/Chest: Effort normal and breath sounds normal.   Abdominal: Soft. Bowel sounds are normal.   Musculoskeletal: Normal range of motion. She exhibits no edema.   Neurological: She is alert and oriented to person, place, and time.   Skin: Skin is warm and dry.   Psychiatric: She has a normal mood and affect.   Nursing note and vitals reviewed.      RESULTS   Results Review:    Results from last 7 days   Lab Units 20  0705 20  0530 08/15/20  0448 20  2044   SODIUM mmol/L 138 136 138 135*   POTASSIUM mmol/L 4.1 4.9 5.4* 5.0   CHLORIDE mmol/L 107 103 102 99   CO2 mmol/L 23.0 20.0* 22.0 22.0   BUN mg/dL 17 22 29* 29*   CREATININE mg/dL 1.48* 1.74* 2.30* 2.29*   CALCIUM mg/dL 8.5* 8.8 9.2 8.7   BILIRUBIN mg/dL 0.4 0.5  --  0.4 "   ALK PHOS U/L 56 64  --  69   ALT (SGPT) U/L <5 <5  --  <5   AST (SGOT) U/L 10 9  --  10   GLUCOSE mg/dL 105* 109* 106* 107*       Estimated Creatinine Clearance: 42.5 mL/min (A) (by C-G formula based on SCr of 1.48 mg/dL (H)).    Results from last 7 days   Lab Units 08/16/20  0530   MAGNESIUM mg/dL 1.9   PHOSPHORUS mg/dL 2.2*       Results from last 7 days   Lab Units 08/16/20  0530   URIC ACID mg/dL 5.5       Results from last 7 days   Lab Units 08/17/20  0705 08/14/20  1949   WBC 10*3/mm3 6.35 7.74   HEMOGLOBIN g/dL 12.9 13.6   PLATELETS 10*3/mm3 317 354               Imaging Results (Last 24 Hours)     Procedure Component Value Units Date/Time    XR Abdomen KUB [[id number]] Collected:  08/16/20 1222     Updated:  08/16/20 1333    Narrative:       PROCEDURE: XR ABDOMEN KUB    VIEWS:  3    INDICATION:Abdominal pain/discomfort    COMPARISON: None    FINDINGS:      - Bowel gas pattern: Nonobstructive. Slightly increased stool  in the rectosigmoid    - Free air: None    - Soft tissue:No gross evidence of organomegaly,      an abdominal mass,or ascites    - Calculi: Tiny irregular calcific density measuring  approximately 5 mm in greatest dimension projected over the left  renal shadow probably represents renal calculus    - Osseous:Limited assessment, unremarkable for age.    - Misc: Right upper quadrant clips suggest prior  cholecystectomy        Impression:       1. Non-obstructed bowel gas pattern. Slightly increased stool  burden in the rectosigmoid may indicate constipation.  2. Left nephrolithiasis    Electronically signed by:  Juliette Davis MD  8/16/2020 1:32 PM CDT  Workstation: [id number]    US Renal Bilateral [[id number]] Collected:  08/16/20 0859     Updated:  08/16/20 1132    Narrative:       PROCEDURE: US RENAL BILATERAL    INDICATION:  Acute on chronic kidney disease     COMPARISON:  None    TECHNIQUE:  Ultrasound, renal    FINDINGS:    Kidney, right:      size:  Normal, measuring 10.4 x 4.9 x 5.0 cm     echotexture:  Diffusely mildly echogenic    No nephrolithiasis, solid mass, or collecting system dilation.  Renal cortex appears diffusely thinned    Kidney, left:      size:  Normal, measuring 10.7 x 4.7 x 5.4 cm    echotexture:  Diffusely echogenic    No nephrolithiasis, solid mass, or collecting system dilation    Urinary bladder: Unremarkable, measuring 2.2 x 3.1 x 6.8 cm, and  containing 278 mL of urine      Impression:       1. Echogenic appearance of renal parenchyma bilaterally, likely  the result of patient's chronic medical renal disease  2. Right renal cortex appears diffusely thinned.      Electronically signed by:  Juliette Davis MD  8/16/2020 11:31 AM  CDT Workstation: [id number]           MEDICATIONS      aspirin 81 mg Oral Daily   carbidopa-levodopa 1 tablet Oral TID   cefTRIAXone 1 g Intravenous Q24H   docusate sodium 100 mg Oral BID   heparin (porcine) 5,000 Units Subcutaneous Q8H   isosorbide mononitrate 30 mg Oral Daily   metoprolol tartrate 25 mg Oral Q12H   nystatin  Topical Q12H   pantoprazole 40 mg Oral Q AM   polyethylene glycol 17 g Oral Daily   sodium chloride 10 mL Intravenous Q12H   sucralfate 1 g Oral 4x Daily       sodium chloride 100 mL/hr Last Rate: 100 mL/hr (08/16/20 2038)       Assessment/Plan   ASSESSMENT / PLAN      Recurrent falls    Hypertension    Diabetes mellitus (CMS/HCC)    Aphasia    Altered mental status    ADRIANNA (acute kidney injury) (CMS/HCC)    1. ADRIANNA on CKD 3  - creatinine one year ago 1.3 mg/dL. Creatinine peak 2.30 this admission & currently 1.48. CKD most likely related to age, PPI use and HTN. She was on bactrim for uti PTA. Not on thiazide diuretic PTA. Urine shows positive nitrites and leukocytes. Renal US some cortical thinning, no masses,hydronephrosis or calculi identified. - she received 2 liters of LR initially on arrival.  IVFs NS at 100cc/hr. Decreased oral intake with vomiting- dc ivf for now since cr is close to baseline now.     Will see lab in am and  f/u in office in 2-3 weeks     - she did received MRA but this was done without contrast.  - UOP: 1,700 mL in last 3 shifts      2. HTN- BP is acceptable. She sees Dr Melton annually. Taking spironolactone 25 mg po daily. Held when potassium was elevated to 5.4 but has now improved to normal range at 4.1.  Continue metoprolol 25mg po bid and isosorbide.     3. GERD,chronic gastric ulcer  - on pantoprazole 40mg po qd     4. Hyperkalemia- resolved K now 4.1     5. UTI  -On ceftriaxone  - culture was not obtained- new order sent     6. OA  - does not take NSAIDS taking hydrocodone for pain- got oxycodone today for pain.    NOTE:Daughter is requesting that patient COVID tested due to vomiting and sore throat issue- nursing notified. Pt's  at home is a cancer patient.           This document has been electronically signed by Dameon Ch Jr, MD on August 17, 2020 09:49      I have reviewed the case with the resident. I have also examined and seen  the patient.  I agree with the assessment and plan as documented in the resident's note.         This document has been electronically signed by Chalino Lam MD on August 17, 2020 16:40

## 2020-08-17 NOTE — PLAN OF CARE
Problem: Patient Care Overview  Goal: Plan of Care Review  Outcome: Ongoing (interventions implemented as appropriate)  Flowsheets (Taken 8/17/2020 1600)  Plan of Care Reviewed With: patient  Outcome Summary: OT tx on this date.  Pt participated in UB therex with 3 lb dowel rosa maria 2 x 10 reps.  She also participated in LB dressing activity to don pants and socks with CGA.  Good progress - No goals met. Will continue with POC.

## 2020-08-17 NOTE — PROGRESS NOTES
[facility] Medicine Services  INPATIENT PROGRESS NOTE    Length of Stay: 3  Date of Admission: 8/14/2020  Primary Care Physician: Sandhya Thomas DO    Subjective   Chief Complaint: No complaints    HPI:     8/17/2020: I spoke with the patient's daughter and POA (Sally Colon) and she is concerned that the patient may be taking too much medication (gabapentin, Ambien and Flexeril), which is causing increased somnolence, confusion and delusions.  Patient's daughter states she is more alert and oriented today.  She has not received any of these medications since admission.    8/16/2020:  Patient complained of abdominal pain today.  KUB indicated constipation.  Nephrology is following for acute on chronic kidney disease.  Patient reports nausea this a.m.  This afternoon the patient states she has developed a sore throat and chills.  COVID-19 test pending.    H&P:  This is a 69-year-old  female with past medical history of hypertension, DM 2, GERD and morbid obesity that presented to [facility] on 8/14/2020 with complaints of recurrent falls, confusion and speech difficulties.  CT of the head, MRI angiogram of the head and MRI of the brain were negative for any acute findings.  Patient was found to have a UTI and acute kidney injury.  Patient was previously being treated with Bactrim, Pyridium and Diflucan for UTI by her PCP.  EEG showed mild to moderate generalized slowing with no epileptiform activity seen.      Review of Systems   Constitutional: Positive for fatigue. Negative for activity change.   HENT: Negative for ear pain and sore throat.    Eyes: Negative for pain and discharge.   Respiratory: Negative for cough and shortness of breath.    Cardiovascular: Negative for chest pain and palpitations.   Gastrointestinal: Positive for abdominal pain and constipation. Negative for nausea.   Endocrine: Negative for cold intolerance and heat intolerance.      Genitourinary: Negative for difficulty urinating and dysuria.   Musculoskeletal: Negative for arthralgias and gait problem.   Skin: Negative for color change and rash.   Neurological: Negative for dizziness and weakness.   Psychiatric/Behavioral: Negative for agitation and confusion.        Objective    Temp:  [96.3 °F (35.7 °C)-98.2 °F (36.8 °C)] 96.4 °F (35.8 °C)  Heart Rate:  [62-91] 62  Resp:  [18-20] 18  BP: (129-142)/(62-74) 129/65    Physical Exam   Constitutional: She is oriented to person, place, and time. She appears well-developed and well-nourished.   HENT:   Head: Normocephalic and atraumatic.   Eyes: Pupils are equal, round, and reactive to light. EOM are normal.   Neck: Normal range of motion. Neck supple.   Cardiovascular: Normal rate and regular rhythm.   Pulmonary/Chest: Effort normal and breath sounds normal.   Abdominal: Soft. Bowel sounds are normal.   Musculoskeletal: Normal range of motion.   Neurological: She is alert and oriented to person, place, and time.   Skin: Skin is warm and dry.   Psychiatric: She has a normal mood and affect. Her behavior is normal.     Results Review:  I have reviewed the labs, radiology results, and diagnostic studies.    Laboratory Data:   Results from last 7 days   Lab Units 08/17/20  0705 08/16/20  0530 08/15/20  0448 08/14/20  2044   SODIUM mmol/L 138 136 138 135*   POTASSIUM mmol/L 4.1 4.9 5.4* 5.0   CHLORIDE mmol/L 107 103 102 99   CO2 mmol/L 23.0 20.0* 22.0 22.0   BUN mg/dL 17 22 29* 29*   CREATININE mg/dL 1.48* 1.74* 2.30* 2.29*   GLUCOSE mg/dL 105* 109* 106* 107*   CALCIUM mg/dL 8.5* 8.8 9.2 8.7   BILIRUBIN mg/dL 0.4 0.5  --  0.4   ALK PHOS U/L 56 64  --  69   ALT (SGPT) U/L <5 <5  --  <5   AST (SGOT) U/L 10 9  --  10   ANION GAP mmol/L 8.0 13.0 14.0 14.0     Estimated Creatinine Clearance: 42.5 mL/min (A) (by C-G formula based on SCr of 1.48 mg/dL (H)).  Results from last 7 days   Lab Units 08/16/20  0530   MAGNESIUM mg/dL 1.9   PHOSPHORUS mg/dL 2.2*      Results from last 7 days   Lab Units 08/16/20  0530   URIC ACID mg/dL 5.5     Results from last 7 days   Lab Units 08/17/20  0705 08/14/20  1950   WBC 10*3/mm3 6.35 7.74   HEMOGLOBIN g/dL 12.9 13.6   HEMATOCRIT % 38.1 40.6   PLATELETS 10*3/mm3 317 354           Culture Data:   No results found for: BLOODCX  No results found for: URINECX  No results found for: RESPCX  No results found for: WOUNDCX  No results found for: STOOLCX  No components found for: BODYFLD    Radiology Data:   Imaging Results (Last 24 Hours)     ** No results found for the last 24 hours. **          I have reviewed the patient's current medications.     Assessment/Plan     Active Hospital Problems    Diagnosis POA   • **Recurrent falls [R29.6] Not Applicable   • Aphasia [R47.01] Yes   • Altered mental status [R41.82] Yes   • ADRIANNA (acute kidney injury) (CMS/[facility]) [N17.9] Yes   • Diabetes mellitus (CMS/[facility]) [E11.9] Yes   • Hypertension [I10] Yes       Plan:    1.  Altered mental status: Patient is alert and oriented today.  Neurological work-up is negative.  Daughter is concerned about the patient taking Ambien, Flexeril and gabapentin.  2.  Urinary tract infection: Continue Rocephin.  3.  Acute on chronic kidney injury: Nephrology consult appreciated.  Creatinine has improved at 1.48 today.  Continue IV fluids.  Aldactone held.  4.  Dementia: Continue home carbidopa and levodopa.  5.  Hypertension/CAD: Continue home metoprolol, Imdur and aspirin.  6.  Constipation:  Miralax, colace and as needed Dulcolax ordered.   7.  Tiny avulsion fracture, medial malleolus of right ankle:  Walking boot to right foot.   8.  Deconditioning: Continue PT/OT.  Patient will need home health at discharge.    The patient was evaluated during the global COVID-19 pandemic, and the diagnosis was suspected/considered upon their initial presentation.  Evaluation, treatment, and testing were consistent with current guidelines for patients who present with complaints or  symptoms that may be related to COVID-19.      Discharge Planning: I expect patient to be discharged to home in 1-2 days.      This document has been electronically signed by TOBI Mcdonald on August 17, 2020 14:10

## 2020-08-18 VITALS
BODY MASS INDEX: 43.23 KG/M2 | TEMPERATURE: 96.5 F | HEIGHT: 63 IN | OXYGEN SATURATION: 93 % | DIASTOLIC BLOOD PRESSURE: 63 MMHG | HEART RATE: 65 BPM | RESPIRATION RATE: 18 BRPM | WEIGHT: 244 LBS | SYSTOLIC BLOOD PRESSURE: 135 MMHG

## 2020-08-18 LAB
ALBUMIN SERPL-MCNC: 3.9 G/DL (ref 3.5–5.2)
ALBUMIN/GLOB SERPL: 1.4 G/DL
ALP SERPL-CCNC: 59 U/L (ref 39–117)
ALT SERPL W P-5'-P-CCNC: <5 U/L (ref 1–33)
ANION GAP SERPL CALCULATED.3IONS-SCNC: 9 MMOL/L (ref 5–15)
AST SERPL-CCNC: 16 U/L (ref 1–32)
BASOPHILS # BLD AUTO: 0.03 10*3/MM3 (ref 0–0.2)
BASOPHILS NFR BLD AUTO: 0.5 % (ref 0–1.5)
BILIRUB SERPL-MCNC: 0.4 MG/DL (ref 0–1.2)
BUN SERPL-MCNC: 15 MG/DL (ref 8–23)
BUN/CREAT SERPL: 10.8 (ref 7–25)
CALCIUM SPEC-SCNC: 8.8 MG/DL (ref 8.6–10.5)
CHLORIDE SERPL-SCNC: 107 MMOL/L (ref 98–107)
CO2 SERPL-SCNC: 22 MMOL/L (ref 22–29)
CREAT SERPL-MCNC: 1.39 MG/DL (ref 0.57–1)
DEPRECATED RDW RBC AUTO: 52.9 FL (ref 37–54)
EOSINOPHIL # BLD AUTO: 0.22 10*3/MM3 (ref 0–0.4)
EOSINOPHIL NFR BLD AUTO: 3.7 % (ref 0.3–6.2)
ERYTHROCYTE [DISTWIDTH] IN BLOOD BY AUTOMATED COUNT: 14.3 % (ref 12.3–15.4)
GFR SERPL CREATININE-BSD FRML MDRD: 37 ML/MIN/1.73
GLOBULIN UR ELPH-MCNC: 2.8 GM/DL
GLUCOSE SERPL-MCNC: 94 MG/DL (ref 65–99)
HCT VFR BLD AUTO: 39.4 % (ref 34–46.6)
HGB BLD-MCNC: 13.4 G/DL (ref 12–15.9)
IMM GRANULOCYTES # BLD AUTO: 0.01 10*3/MM3 (ref 0–0.05)
IMM GRANULOCYTES NFR BLD AUTO: 0.2 % (ref 0–0.5)
LYMPHOCYTES # BLD AUTO: 2.54 10*3/MM3 (ref 0.7–3.1)
LYMPHOCYTES NFR BLD AUTO: 42.8 % (ref 19.6–45.3)
MCH RBC QN AUTO: 34.5 PG (ref 26.6–33)
MCHC RBC AUTO-ENTMCNC: 34 G/DL (ref 31.5–35.7)
MCV RBC AUTO: 101.5 FL (ref 79–97)
MONOCYTES # BLD AUTO: 0.33 10*3/MM3 (ref 0.1–0.9)
MONOCYTES NFR BLD AUTO: 5.6 % (ref 5–12)
NEUTROPHILS NFR BLD AUTO: 2.81 10*3/MM3 (ref 1.7–7)
NEUTROPHILS NFR BLD AUTO: 47.2 % (ref 42.7–76)
NRBC BLD AUTO-RTO: 0 /100 WBC (ref 0–0.2)
PLATELET # BLD AUTO: 292 10*3/MM3 (ref 140–450)
PMV BLD AUTO: 9.3 FL (ref 6–12)
POTASSIUM SERPL-SCNC: 4.2 MMOL/L (ref 3.5–5.2)
PROT SERPL-MCNC: 6.7 G/DL (ref 6–8.5)
RBC # BLD AUTO: 3.88 10*6/MM3 (ref 3.77–5.28)
SODIUM SERPL-SCNC: 138 MMOL/L (ref 136–145)
WBC # BLD AUTO: 5.94 10*3/MM3 (ref 3.4–10.8)

## 2020-08-18 PROCEDURE — 85025 COMPLETE CBC W/AUTO DIFF WBC: CPT | Performed by: NURSE PRACTITIONER

## 2020-08-18 PROCEDURE — 25010000002 HEPARIN (PORCINE) PER 1000 UNITS: Performed by: INTERNAL MEDICINE

## 2020-08-18 PROCEDURE — 80053 COMPREHEN METABOLIC PANEL: CPT | Performed by: NURSE PRACTITIONER

## 2020-08-18 PROCEDURE — 97535 SELF CARE MNGMENT TRAINING: CPT

## 2020-08-18 RX ORDER — CEFDINIR 300 MG/1
300 CAPSULE ORAL 2 TIMES DAILY
Qty: 6 CAPSULE | Refills: 0 | Status: SHIPPED | OUTPATIENT
Start: 2020-08-18 | End: 2020-08-21

## 2020-08-18 RX ADMIN — HYDROCODONE BITARTRATE AND ACETAMINOPHEN 1 TABLET: 5; 325 TABLET ORAL at 01:29

## 2020-08-18 RX ADMIN — DOCUSATE SODIUM 100 MG: 100 CAPSULE, LIQUID FILLED ORAL at 08:03

## 2020-08-18 RX ADMIN — HYDROCODONE BITARTRATE AND ACETAMINOPHEN 1 TABLET: 5; 325 TABLET ORAL at 08:01

## 2020-08-18 RX ADMIN — ASPIRIN 81 MG: 81 TABLET, COATED ORAL at 08:03

## 2020-08-18 RX ADMIN — NYSTATIN: 100000 POWDER TOPICAL at 08:03

## 2020-08-18 RX ADMIN — SODIUM CHLORIDE, PRESERVATIVE FREE 10 ML: 5 INJECTION INTRAVENOUS at 08:04

## 2020-08-18 RX ADMIN — METOPROLOL TARTRATE 25 MG: 25 TABLET, FILM COATED ORAL at 08:03

## 2020-08-18 RX ADMIN — HEPARIN SODIUM 5000 UNITS: 5000 INJECTION INTRAVENOUS; SUBCUTANEOUS at 06:04

## 2020-08-18 RX ADMIN — ISOSORBIDE MONONITRATE 30 MG: 30 TABLET, EXTENDED RELEASE ORAL at 08:03

## 2020-08-18 RX ADMIN — CARBIDOPA AND LEVODOPA 1 TABLET: 25; 100 TABLET ORAL at 08:03

## 2020-08-18 RX ADMIN — SUCRALFATE 1 G: 1 TABLET ORAL at 08:03

## 2020-08-18 RX ADMIN — PANTOPRAZOLE SODIUM 40 MG: 40 TABLET, DELAYED RELEASE ORAL at 06:04

## 2020-08-18 NOTE — PROGRESS NOTES
"[facility]  [address] [place] [address]. [address]  T - [phone]  F - [phone]     Progress Note          PATIENT  DEMOGRAPHICS   PATIENT NAME: Lili Arguello                      PHYSICIAN: Dameon Ch Jr, MD  : 1950  MRN: [id number]   LOS: 4 days    Patient Care Team:  Sandhya Thomas,  as PCP - General  Subjective   SUBJECTIVE   Resting comfortably in bed.  Awake & in no acute distress.          Objective   OBJECTIVE   Vital Signs  Temp:  [96.4 °F (35.8 °C)-98 °F (36.7 °C)] 98 °F (36.7 °C)  Heart Rate:  [62-89] 89  Resp:  [16-19] 16  BP: (104-162)/(53-82) 162/82    Flowsheet Rows      First Filed Value   Admission Height  160 cm (63\") Documented at 2020   Admission Weight  107 kg (235 lb) Documented at 2020           I/O last 3 completed shifts:  In: 580 [P.O.:480; IV Piggyback:100]  Out: 500 [Urine:500]    PHYSICAL EXAM    Physical Exam   Constitutional: She is oriented to person, place, and time. She appears well-developed and well-nourished.   HENT:   Head: Normocephalic and atraumatic.   Eyes: Pupils are equal, round, and reactive to light.   Cardiovascular: Normal rate, regular rhythm and normal heart sounds.   Pulmonary/Chest: Effort normal and breath sounds normal.   Abdominal: Soft. Bowel sounds are normal.   Musculoskeletal: Normal range of motion. She exhibits no edema.   Neurological: She is alert and oriented to person, place, and time.   Skin: Skin is warm and dry.   Psychiatric: She has a normal mood and affect.   Nursing note and vitals reviewed.      RESULTS   Results Review:    Results from last 7 days   Lab Units 20  0612 20  0705 20  0530   SODIUM mmol/L 138 138 136   POTASSIUM mmol/L 4.2 4.1 4.9   CHLORIDE mmol/L 107 107 103   CO2 mmol/L 22.0 23.0 20.0*   BUN mg/dL 15 17 22   CREATININE mg/dL 1.39* 1.48* 1.74*   CALCIUM mg/dL 8.8 8.5* 8.8   BILIRUBIN mg/dL 0.4 0.4 0.5   ALK PHOS U/L 59 56 64   ALT " (SGPT) U/L <5 <5 <5   AST (SGOT) U/L 16 10 9   GLUCOSE mg/dL 94 105* 109*       Estimated Creatinine Clearance: 45.1 mL/min (A) (by C-G formula based on SCr of 1.39 mg/dL (H)).    Results from last 7 days   Lab Units 08/16/20  0530   MAGNESIUM mg/dL 1.9   PHOSPHORUS mg/dL 2.2*       Results from last 7 days   Lab Units 08/16/20  0530   URIC ACID mg/dL 5.5       Results from last 7 days   Lab Units 08/18/20  0612 08/17/20  0705 08/14/20  1950   WBC 10*3/mm3 5.94 6.35 7.74   HEMOGLOBIN g/dL 13.4 12.9 13.6   PLATELETS 10*3/mm3 292 317 354               Imaging Results (Last 24 Hours)     ** No results found for the last 24 hours. **           MEDICATIONS      aspirin 81 mg Oral Daily   carbidopa-levodopa 1 tablet Oral TID   cefTRIAXone 1 g Intravenous Q24H   docusate sodium 100 mg Oral BID   heparin (porcine) 5,000 Units Subcutaneous Q8H   isosorbide mononitrate 30 mg Oral Daily   metoprolol tartrate 25 mg Oral Q12H   nystatin  Topical Q12H   pantoprazole 40 mg Oral Q AM   polyethylene glycol 17 g Oral Daily   sodium chloride 10 mL Intravenous Q12H   sucralfate 1 g Oral 4x Daily          Assessment/Plan   ASSESSMENT / PLAN      Recurrent falls    Hypertension    Diabetes mellitus (CMS/HCC)    Aphasia    Altered mental status    ADRIANNA (acute kidney injury) (CMS/[facility])    1. ADRIANNA on CKD 3  - No resolved   - creatinine one year ago 1.3 mg/dL. Creatinine peak 2.30 this admission & currently 1.38. CKD most likely related to age, PPI use and HTN. She was on bactrim for uti PTA. Not on thiazide diuretic PTA. Urine shows positive nitrites and leukocytes. Renal US some cortical thinning, no masses,hydronephrosis or calculi identified. - she received 2 liters of LR initially on arrival.  IVFs NS at 100cc/hr. Decreased oral intake with vomiting- dc ivf for now since cr is close to baseline now.     Will f/u in office in 2-3 weeks     - she did received MRA but this was done without contrast.  - UOP: 1,700 mL in last 3 shifts      2.  HTN- BP is acceptable. She sees Dr Melton annually. Taking spironolactone 25 mg po daily. Held when potassium was elevated to 5.4 but has now improved to normal range at 4.2.  Continue metoprolol 25mg po bid and isosorbide.     3. GERD,chronic gastric ulcer  - on pantoprazole 40mg po qd     4. Hyperkalemia- resolved K now 4.2     5. UTI  -On ceftriaxone  - culture was not obtained- new order sent     6. OA  - does not take NSAIDS taking hydrocodone for pain- got oxycodone today for pain.           This document has been electronically signed by Dameon Ch Jr, MD on August 18, 2020 08:59    I have reviewed the case with the resident. I have also examined and seen  the patient.  I agree with the assessment and plan as documented in the resident's note.         This document has been electronically signed by Chalino Lam MD on August 18, 2020 11:00

## 2020-08-18 NOTE — SIGNIFICANT NOTE
08/18/20 1043   Rehab Treatment   Discipline physical therapy assistant   Reason Treatment Not Performed patient/family declined treatment

## 2020-08-18 NOTE — PLAN OF CARE
Vss. Pt alert and oriented throughout night. No complaints this shift and resting between care.     Problem: Fall Risk (Adult)  Goal: Identify Related Risk Factors and Signs and Symptoms  Outcome: Ongoing (interventions implemented as appropriate)  Goal: Absence of Fall  Outcome: Ongoing (interventions implemented as appropriate)  Flowsheets (Taken 8/18/2020 0321)  Absence of Fall: making progress toward outcome     Problem: Patient Care Overview  Goal: Plan of Care Review  Outcome: Ongoing (interventions implemented as appropriate)  Flowsheets (Taken 8/18/2020 0321)  Progress: improving  Plan of Care Reviewed With: patient  Goal: Individualization and Mutuality  Outcome: Ongoing (interventions implemented as appropriate)  Goal: Discharge Needs Assessment  Outcome: Ongoing (interventions implemented as appropriate)  Goal: Interprofessional Rounds/Family Conf  Outcome: Ongoing (interventions implemented as appropriate)     Problem: Skin Injury Risk (Adult)  Goal: Identify Related Risk Factors and Signs and Symptoms  Outcome: Ongoing (interventions implemented as appropriate)  Goal: Skin Health and Integrity  Outcome: Ongoing (interventions implemented as appropriate)  Flowsheets (Taken 8/18/2020 0321)  Skin Health and Integrity: making progress toward outcome

## 2020-08-18 NOTE — DISCHARGE SUMMARY
[facility] Medicine Services  DISCHARGE SUMMARY       Date of Admission: 8/14/2020  Date of Discharge:  8/18/2020  Primary Care Physician: Sandhya Thomas DO    Presenting Problem/History of Present Illness:  ADRIANNA (acute kidney injury) (CMS/[facility]) [N17.9]  Fall, initial encounter [W19.XXXA]  Urinary tract infection without hematuria, site unspecified [N39.0]  Speech disturbance, unspecified type [R47.9]     Final Discharge Diagnoses:  Active Hospital Problems    Diagnosis   • **Recurrent falls   • Aphasia   • Altered mental status   • ADRIANNA (acute kidney injury) (CMS/[facility])   • Diabetes mellitus (CMS/[facility])   • Hypertension       Consults:   Consults     Date and Time Order Name Status Description    8/15/2020 1221 Inpatient Nephrology Consult Completed     8/14/2020 2203 Inpatient Neurology Consult Stroke Completed           Pertinent Test Results:   Lab Results (last 24 hours)     Procedure Component Value Units Date/Time    Comprehensive Metabolic Panel [[id number]]  (Abnormal) Collected:  08/18/20 0612    Specimen:  Blood Updated:  08/18/20 0702     Glucose 94 mg/dL      BUN 15 mg/dL      Creatinine 1.39 mg/dL      Sodium 138 mmol/L      Potassium 4.2 mmol/L      Chloride 107 mmol/L      CO2 22.0 mmol/L      Calcium 8.8 mg/dL      Total Protein 6.7 g/dL      Albumin 3.90 g/dL      ALT (SGPT) <5 U/L      AST (SGOT) 16 U/L      Alkaline Phosphatase 59 U/L      Total Bilirubin 0.4 mg/dL      eGFR Non African Amer 37 mL/min/1.73      Globulin 2.8 gm/dL      A/G Ratio 1.4 g/dL      BUN/Creatinine Ratio 10.8     Anion Gap 9.0 mmol/L     Narrative:       GFR Normal >60  Chronic Kidney Disease <60  Kidney Failure <15      CBC & Differential [[id number]] Collected:  08/18/20 0612    Specimen:  Blood Updated:  08/18/20 0635    Narrative:       The following orders were created for panel order CBC & Differential.  Procedure                               Abnormality         Status                      ---------                               -----------         ------                     CBC Auto Differential[[id number]]        Abnormal            Final result                 Please view results for these tests on the individual orders.    CBC Auto Differential [[id number]]  (Abnormal) Collected:  08/18/20 0612    Specimen:  Blood Updated:  08/18/20 0635     WBC 5.94 10*3/mm3      RBC 3.88 10*6/mm3      Hemoglobin 13.4 g/dL      Hematocrit 39.4 %      .5 fL      MCH 34.5 pg      MCHC 34.0 g/dL      RDW 14.3 %      RDW-SD 52.9 fl      MPV 9.3 fL      Platelets 292 10*3/mm3      Neutrophil % 47.2 %      Lymphocyte % 42.8 %      Monocyte % 5.6 %      Eosinophil % 3.7 %      Basophil % 0.5 %      Immature Grans % 0.2 %      Neutrophils, Absolute 2.81 10*3/mm3      Lymphocytes, Absolute 2.54 10*3/mm3      Monocytes, Absolute 0.33 10*3/mm3      Eosinophils, Absolute 0.22 10*3/mm3      Basophils, Absolute 0.03 10*3/mm3      Immature Grans, Absolute 0.01 10*3/mm3      nRBC 0.0 /100 WBC         Imaging Results (Last 24 Hours)     ** No results found for the last 24 hours. **        Chief Complaint on Day of Discharge: No complaints    Hospital Course:    This is a 69-year-old  female with past medical history of hypertension, DM 2, GERD and morbid obesity that presented to [facility] on [facility]/14/2020 with complaints of recurrent falls, confusion and speech difficulties.  CT of the head, MRI angiogram of the head and MRI of the brain were negative for any acute findings.  Patient was found to have a UTI and acute kidney injury.  Patient was previously being treated with Bactrim, Pyridium and Diflucan for UTI by her PCP.  Patient was started on IV Rocephin and transition to oral Omnicef at discharge. EEG showed mild to moderate generalized slowing with no epileptiform activity seen.  Patient was evaluated by nephrology and creatinine improved at 1.39.  Patient will follow-up in 2 to 3 weeks with   "Genaro.  The patient was counseled on taking excessive medications that could cause confusion or drowsiness such as Ambien, Flexeril, gabapentin and Norco.  Patient was also noted to have a tiny avulsion fracture of the medial malleolus of the right ankle.  She was given a walking boot but cautioned to wear it when she has assistance with walking.  Home health was set up for physical and occupational therapy along with cardiopulmonary assessments.  Patient will follow-up with her primary care physician within 1 week.    Condition on Discharge: Stable    Physical Exam on Discharge:  /63 (BP Location: Left arm, Patient Position: Lying)   Pulse 65   Temp 96.5 °F (35.8 °C) (Oral)   Resp 18   Ht 160 cm (63\")   Wt 111 kg (244 lb)   SpO2 93%   BMI 43.22 kg/m²   Physical Exam   Constitutional: She is oriented to person, place, and time. She appears well-developed and well-nourished.   HENT:   Head: Normocephalic and atraumatic.   Eyes: Pupils are equal, round, and reactive to light. EOM are normal.   Neck: Normal range of motion. Neck supple.   Cardiovascular: Normal rate and regular rhythm.   Pulmonary/Chest: Effort normal and breath sounds normal.   Abdominal: Soft. Bowel sounds are normal.   Musculoskeletal: Normal range of motion.   Neurological: She is alert and oriented to person, place, and time.   Skin: Skin is warm and dry.   Psychiatric: She has a normal mood and affect. Her behavior is normal.     Discharge Disposition:  Home-Health Care [facility]    Discharge Medications:     Discharge Medications      New Medications      Instructions Start Date   cefdinir 300 MG capsule  Commonly known as:  OMNICEF   300 mg, Oral, 2 Times Daily         Continue These Medications      Instructions Start Date   albuterol sulfate  (90 Base) MCG/ACT inhaler  Commonly known as:  PROVENTIL HFA;VENTOLIN HFA;PROAIR HFA   2 puffs, Inhalation, Every 4 Hours PRN      aspirin 81 MG EC tablet   81 mg, Oral, Daily      "   carbidopa-levodopa  MG per tablet  Commonly known as:  SINEMET   1 tablet, Oral, 3 Times Daily      dicyclomine 20 MG tablet  Commonly known as:  BENTYL   20 mg, Oral, 4 Times Daily Before Meals & Nightly PRN      HYDROcodone-acetaminophen 7.5-325 MG per tablet  Commonly known as:  NORCO   1 tablet, Oral, Every 6 Hours PRN      isosorbide mononitrate 30 MG 24 hr tablet  Commonly known as:  IMDUR   30 mg, Oral, Daily      metoprolol tartrate 25 MG tablet  Commonly known as:  LOPRESSOR   25 mg, Oral, 2 Times Daily      ondansetron ODT 8 MG disintegrating tablet  Commonly known as:  ZOFRAN-ODT   8 mg, Oral, Every 8 Hours PRN      pantoprazole 40 MG EC tablet  Commonly known as:  PROTONIX   40 mg, Oral      spironolactone 25 MG tablet  Commonly known as:  ALDACTONE   25 mg, Oral, Daily      sucralfate 1 g tablet  Commonly known as:  Carafate   1 g, Oral, 4 Times Daily      zolpidem 10 MG tablet  Commonly known as:  AMBIEN   10 mg, Oral, Nightly PRN         Stop These Medications    cyclobenzaprine 10 MG tablet  Commonly known as:  FLEXERIL     gabapentin 300 MG capsule  Commonly known as:  NEURONTIN            Discharge Diet:   Diet Instructions     Diet: Cardiac      Discharge Diet:  Cardiac          Activity at Discharge:   Activity Instructions     Activity as Tolerated            Discharge Care Plan/Instructions: As above    Follow-up Appointment:  Additional Instructions for the Follow-ups that You Need to Schedule     Ambulatory Referral to Home Health   As directed      Face to Face Visit Date:  8/18/2020    Follow-up provider for Plan of Care?:  I treated the patient in an acute care facility and will not continue treatment after discharge.    Follow-up provider:  LAYO WALKER [[id number]]    Reason/Clinical Findings:  deconditioning/ uti    Describe mobility limitations that make leaving home difficult:  deconditioning/ uti    Nursing/Therapeutic Services Requested:  Skilled Nursing Physical Therapy  Occupational Therapy    Skilled nursing orders:  Cardiopulmonary assessments    PT orders:  Therapeutic exercise Gait Training Transfer training Strengthening Home safety assessment    Weight Bearing Status:  As Tolerated    Occupational orders:  Activities of daily living Energy conservation Strengthening Cognition Home safety assessment    Frequency:  1 Week 1            Contact information for follow-up providers     Chalino Lam MD Follow up on 9/15/2020.    Specialty:  Nephrology  Why:  Hospital follow up appointment Tuesday 9/15/20 at 1:30pm.  Contact information:  [address] [facility] [address]  [phone]             Sandhya Thomas DO Follow up on 8/24/2020.    Specialty:  Family Medicine  Why:  Hospital follow up appointment Monday 8/24/20 at 3:00pm.  Contact information:  [facility] [address] [address]  [phone]             [facility] HOME CARE REFERRAL .    Specialty:  Home Health Services  Contact information:  [address]  [facility] [address]                 Contact information for after-discharge care     Home Medical Care     [facility] .    Service:  Home Health Services  Contact information:  [address] [facility] [address] [phone]                                 This document has been electronically signed by TOBI Mcdonald on August 18, 2020 16:39        Time: Greater than 30 minutes.

## 2020-08-18 NOTE — THERAPY TREATMENT NOTE
Acute Care - Occupational Therapy Treatment Note  [facility]     Patient Name: Lili Arguello  : 1950  MRN: [id number]  Today's Date: 2020  Onset of Illness/Injury or Date of Surgery: 20  Date of Referral to OT: 20  Referring Physician: FELICITAS Long MD    Admit Date: 2020       ICD-10-CM ICD-9-CM   1. ADRIANNA (acute kidney injury) (CMS/[facility]) N17.9 584.9   2. Urinary tract infection without hematuria, site unspecified N39.0 599.0   3. Speech disturbance, unspecified type R47.9 784.59   4. Fall, initial encounter W19.XXXA E888.9   5. Impaired functional mobility, balance, gait, and endurance Z74.09 V49.89   6. Impaired mobility and activities of daily living Z74.09 V49.89    Z78.9    7. Aphasia R47.01 784.3   8. Recurrent falls R29.6 V15.88   9. Dyspnea on exertion R06.00 786.09   10. Gastroesophageal reflux disease with esophagitis K21.0 530.11   11. Morbid obesity (CMS/[facility]) E66.01 278.01   12. LVH (left ventricular hypertrophy) I51.7 429.3   13. Diastolic dysfunction I51.89 429.9   14. Viral respiratory illness J98.8 079.99    B97.89    15. History of gastric ulcer Z87.19 V12.79   16. Lower extremity edema R60.0 782.3   17. Neuropathy G62.9 355.9   18. Restless leg syndrome G25.81 333.94   19. Epigastric pain R10.13 789.06     Patient Active Problem List   Diagnosis   • Dyspnea on exertion   • Hypertension   • GERD (gastroesophageal reflux disease)   • Morbid obesity (CMS/[facility])   • Diabetes mellitus (CMS/[facility])   • LVH (left ventricular hypertrophy)   • Diastolic dysfunction   • Viral respiratory illness   • Gout   • History of gastric ulcer   • Insomnia   • Low back pain   • Lower extremity edema   • Neuropathy   • Restless leg syndrome   • Epigastric pain   • Aphasia   • Recurrent falls   • Altered mental status   • ADRIANNA (acute kidney injury) (CMS/[facility])     Past Medical History:   Diagnosis Date   • Chronic gastric ulcer without hemorrhage and without perforation    • Depressive  disorder    • Diabetes mellitus (CMS/HCC)    • GERD (gastroesophageal reflux disease)    • Gout    • Hypertension    • Insomnia    • Morbid obesity (CMS/HCC)    • Osteoarthritis of multiple joints      Past Surgical History:   Procedure Laterality Date   • BREAST SURGERY     • CHOLECYSTECTOMY     • ENDOSCOPY     • ENDOSCOPY N/A 7/6/2020    Procedure: ESOPHAGOGASTRODUODENOSCOPY possible dilation Fri;  Surgeon: Dameon Benjamin MD;  Location: [facility] ENDOSCOPY;  Service: Gastroenterology;  Laterality: N/A;   • GASTRECTOMY     • JOINT REPLACEMENT     • KNEE SURGERY Bilateral 10/02/2014       Therapy Treatment    Rehabilitation Treatment Summary     Row Name 08/18/20 0907             Treatment Time/Intention    Discipline  occupational therapy assistant  -BB      Document Type  therapy note (daily note)  -BB      Subjective Information  complains of;pain;fatigue  -BB      Mode of Treatment  individual therapy;occupational therapy  -BB      Patient/Family Observations  no family present  -BB      Total Minutes, Occupational Therapy Treatment  24  -BB      Therapy Frequency (OT Eval)  other (see comments) 5-7 days/wk  -BB      Patient Effort  good  -BB      Comment  Pt defers ADL and UB exercises 2' to UB and back paiin  -BB      Existing Precautions/Restrictions  fall  -BB      Recorded by [BB] Kylee Prater COTA/L 08/18/20 1211      Row Name 08/18/20 0907             Vital Signs    Pre Systolic BP Rehab  163  -BB      Pre Treatment Diastolic BP  78  -BB      Pretreatment Heart Rate (beats/min)  86  -BB      Posttreatment Heart Rate (beats/min)  83  -BB      Pre SpO2 (%)  96  -BB      O2 Delivery Pre Treatment  room air  -BB      Post SpO2 (%)  96  -BB      O2 Delivery Post Treatment  room air  -BB      Pre Patient Position  Supine  -BB      Post Patient Position  Supine  -BB      Recorded by [BB] Kylee Prater COTA/L 08/18/20 1211      Row Name 08/18/20 0907             Cognitive Assessment/Intervention-  PT/OT    Orientation Status (Cognition)  oriented x 4  -BB      Personal Safety Interventions  fall prevention program maintained;gait belt;nonskid shoes/slippers when out of bed;supervised activity  -BB      Recorded by [BB] Kylee Prater COTA/L 08/18/20 1211      Row Name 08/18/20 0907             Safety Issues, Functional Mobility    Comment, Safety Issues/Impairments (Mobility)  Pt educated/verbalized on home safety/fall prevention.  -BB      Recorded by [BB] Kylee Prater COTA/L 08/18/20 1211      Row Name 08/18/20 0907             Grooming Assessment/Training    [place] Level (Grooming)  hair care, combing/brushing;wash face, hands;oral care regimen;set up;supervision  -BB      Grooming Position  long sitting  -BB      Recorded by [BB] Kylee Prater COTA/L 08/18/20 1211      Row Name 08/18/20 0907             Positioning and Restraints    Pre-Treatment Position  in bed  -BB      Post Treatment Position  bed  -BB      In Bed  supine;call light within reach;encouraged to call for assist;exit alarm on  -BB      Recorded by [CARLYLE] Kylee Prater COTA/L 08/18/20 1211      Row Name 08/18/20 0907             Pain Scale: Numbers Pre/Post-Treatment    Pain Scale: Numbers, Pretreatment  8/10  -BB      Pain Scale: Numbers, Post-Treatment  7/10  -BB      Pain Location - Side  Bilateral  -BB      Pain Location - Orientation  upper  -BB      Pain Location  extremity  -BB      Pain Intervention(s)  Medication (See MAR)  -BB      Recorded by [BB] Kylee Prater COTA/L 08/18/20 1211      Row Name                Wound 08/14/20 2235 Left upper arm MASD (Moisture associated skin damage)    Wound - Properties Group Date first assessed: 08/14/20 [KH] Time first assessed: 2235 [KH] Present on Hospital Admission: Y [KH] Side: Left [KH] Orientation: upper [KH] Location: arm [KH], arm pit  Primary Wound Type: MASD [KH] Recorded by:  [FRANCE] Magi Russ RN 08/14/20 2328    Row Name                Wound  Left breast MASD (Moisture associated skin damage)    Wound - Properties Group Side: Left [KH] Location: breast [KH], under  Primary Wound Type: MASD [KH] Recorded by:  [FRANCE] Magi Russ RN 08/14/20 2331    Row Name                Wound 08/14/20 2235 Left groin MASD (Moisture associated skin damage)    Wound - Properties Group Date first assessed: 08/14/20 [KH] Time first assessed: 2235 [KH] Present on Hospital Admission: Y [KH] Side: Left [KH] Location: groin [KH] Primary Wound Type: MASD [KH] Recorded by:  [FRANCE] Magi Russ RN 08/14/20 2333    Row Name                Wound 08/14/20 2235 Right groin MASD (Moisture associated skin damage)    Wound - Properties Group Date first assessed: 08/14/20 [KH] Time first assessed: 2235 [KH] Present on Hospital Admission: Y [KH] Side: Right [KH] Location: groin [KH] Primary Wound Type: MASD [KH] Recorded by:  [FRANCE] Magi Russ RN 08/14/20 2335    Row Name 08/18/20 0907             Plan of Care Review    Plan of Care Reviewed With  patient  -BB      Progress  improving  -BB      Outcome Summary  Pt defers UE exercises and ADL stating she is in pain. Pt educated on home safety and fall prevention.  -BB      Recorded by [CARLYLE] Kylee Prater COTA/L 08/18/20 1211      Row Name 08/18/20 0907             Outcome Summary/Treatment Plan (OT)    Daily Summary of Progress (OT)  progress toward functional goals is good  -BB      Plan for Continued Treatment (OT)  continue OT POC  -BB      Anticipated Discharge Disposition (OT)  home with 24/7 care;home with home health;home with assist  -BB      Recorded by [CARLYLE] Kylee Prater COTA/L 08/18/20 1211        User Key  (r) = Recorded By, (t) = Taken By, (c) = Cosigned By    Initials Name Effective Dates Discipline    BB Kylee Prater COTA/L 03/07/18 -  OT    Magi Wisdom RN 05/29/19 -  Nurse        Wound 08/14/20 2235 Left upper arm MASD (Moisture associated skin damage) (Active)   Dressing Appearance open to air  8/18/2020  8:40 AM   Closure Open to air 8/17/2020  8:00 PM   Base moist;red 8/17/2020  8:00 PM   Periwound intact 8/17/2020  8:00 PM   Periwound Temperature warm 8/17/2020  8:00 PM   Drainage Amount none 8/17/2020  8:00 PM       Wound Left breast MASD (Moisture associated skin damage) (Active)   Dressing Appearance dry;intact;open to air 8/17/2020  8:00 PM   Closure Open to air 8/18/2020  8:40 AM   Base red;moist 8/17/2020  8:00 PM   Drainage Amount none 8/17/2020  8:00 PM       Wound 08/14/20 2235 Left groin MASD (Moisture associated skin damage) (Active)   Dressing Appearance dry;intact;open to air 8/17/2020  8:00 PM   Closure Open to air 8/18/2020  8:40 AM   Base red;moist 8/17/2020  8:00 PM   Periwound intact;warm 8/17/2020  8:00 PM   Periwound Temperature warm 8/17/2020  8:00 PM   Drainage Amount none 8/17/2020  8:00 PM       Wound 08/14/20 2235 Right groin MASD (Moisture associated skin damage) (Active)   Dressing Appearance dry;intact;open to air 8/17/2020  8:00 PM   Closure Open to air 8/18/2020  8:40 AM   Base pink;red 8/17/2020  8:00 PM   Drainage Amount none 8/17/2020  8:00 PM     Rehab Goal Summary     Row Name 08/18/20 0907             Occupational Therapy Goals    Bed Mobility Goal Selection (OT)  bed mobility, OT goal 1  -BB      Transfer Goal Selection (OT)  transfer, OT goal 1  -BB      Bathing Goal Selection (OT)  bathing, OT goal 1  -BB      Dressing Goal Selection (OT)  dressing, OT goal 1  -BB      Toileting Goal Selection (OT)  toileting, OT goal 1  -BB      Activity Tolerance Goal Selection (OT)  activity tolerance, OT goal 1  -BB         Bed Mobility Goal 1 (OT)    Activity/Assistive Device (Bed Mobility Goal 1, OT)  bed mobility activities, all  -BB      [place] Level/Cues Needed (Bed Mobility Goal 1, OT)  conditional independence  -BB      Time Frame (Bed Mobility Goal 1, OT)  long term goal (LTG)  -BB      Progress/Outcomes (Bed Mobility Goal 1, OT)  goal not met  -BB          Transfer Goal 1 (OT)    Activity/Assistive Device (Transfer Goal 1, OT)  transfers, all  -BB      [place] Level/Cues Needed (Transfer Goal 1, OT)  conditional independence  -BB      Time Frame (Transfer Goal 1, OT)  long term goal (LTG)  -BB      Progress/Outcome (Transfer Goal 1, OT)  goal not met  -BB         Bathing Goal 1 (OT)    Activity/Assistive Device (Bathing Goal 1, OT)  bathing skills, all  -BB      [place] Level/Cues Needed (Bathing Goal 1, OT)  contact guard assist  -BB      Time Frame (Bathing Goal 1, OT)  long term goal (LTG)  -BB      Progress/Outcomes (Bathing Goal 1, OT)  goal not met  -BB         Dressing Goal 1 (OT)    Activity/Assistive Device (Dressing Goal 1, OT)  dressing skills, all  -BB      [place]/Cues Needed (Dressing Goal 1, OT)  supervision required  -BB      Time Frame (Dressing Goal 1, OT)  long term goal (LTG)  -BB      Progress/Outcome (Dressing Goal 1, OT)  goal not met  -BB         Toileting Goal 1 (OT)    Activity/Device (Toileting Goal 1, OT)  toileting skills, all  -BB      [place] Level/Cues Needed (Toileting Goal 1, OT)  conditional independence  -BB      Time Frame (Toileting Goal 1, OT)  long term goal (LTG)  -BB      Progress/Outcome (Toileting Goal 1, OT)  goal not met  -BB          Activity Tolerance Goal 1 (OT)    Activity Tolerance Goal 1 (OT)  ADL/therapeutic activity with 1-2 rest breaks.  -BB      Activity Level (Endurance Goal 1, OT)  15 min activity  -BB      Time Frame (Activity Tolerance Goal 1, OT)  long term goal (LTG);by discharge  -BB      Progress/Outcome (Activity Tolerance Goal 1, OT)  goal met  -BB        User Key  (r) = Recorded By, (t) = Taken By, (c) = Cosigned By    Initials Name Provider Type Discipline    BB Kylee Prater COTA/L Occupational Therapy Assistant OT        Occupational Therapy Education                 Title: PT OT SLP Therapies (In Progress)     Topic: Occupational Therapy (In Progress)     Point: ADL  training (Done)     Description:   Instruct learner(s) on proper safety adaptation and remediation techniques during self care or transfers.   Instruct in proper use of assistive devices.              Learning Progress Summary           Patient Acceptance, E, VU by BB at 8/18/2020 1213    Acceptance, E, VU by RB at 8/17/2020 1558    Comment:  Edu on use of sock aide and reacher to don socks and pants.                   Point: Home exercise program (Not Started)     Description:   Instruct learner(s) on appropriate technique for monitoring, assisting and/or progressing therapeutic exercises/activities.              Learner Progress:   Not documented in this visit.          Point: Precautions (Done)     Description:   Instruct learner(s) on prescribed precautions during self-care and functional transfers.              Learning Progress Summary           Patient Acceptance, E, VU by RB at 8/15/2020 1648    Comment:  Edu pt on use of gait belt and non skid socks when OOB and no OOB without assist.                   Point: Body mechanics (Not Started)     Description:   Instruct learner(s) on proper positioning and spine alignment during self-care, functional mobility activities and/or exercises.              Learner Progress:   Not documented in this visit.                      User Key     Initials Effective Dates Name Provider Type Discipline    RB 07/24/19 -  Nathan Casey OT Occupational Therapist OT    BB 03/07/18 -  Kylee Prater COTA/L Occupational Therapy Assistant OT                OT Recommendation and Plan  Outcome Summary/Treatment Plan (OT)  Daily Summary of Progress (OT): progress toward functional goals is good  Plan for Continued Treatment (OT): continue OT POC  Anticipated Discharge Disposition (OT): home with 24/7 care, home with home health, home with assist  Therapy Frequency (OT Eval): other (see comments)(5-7 days/wk)  Daily Summary of Progress (OT): progress toward functional goals is  good  Plan of Care Review  Plan of Care Reviewed With: patient  Plan of Care Reviewed With: patient  Outcome Summary: Pt defers UE exercises and ADL stating she is in pain. Pt educated on home safety and fall prevention.  Outcome Measures     Row Name 08/18/20 0907 08/17/20 1600 08/17/20 1454       How much help from another person do you currently need...    Turning from your back to your side while in flat bed without using bedrails?  --  4  -TA  --    Moving from lying on back to sitting on the side of a flat bed without bedrails?  --  4  -TA  --    Moving to and from a bed to a chair (including a wheelchair)?  --  3  -TA  --    Standing up from a chair using your arms (e.g., wheelchair, bedside chair)?  --  3  -TA  --    Climbing 3-5 steps with a railing?  --  2  -TA  --    To walk in hospital room?  --  3  -TA  --    AM-PAC 6 Clicks Score (PT)  --  19  -TA  --       How much help from another is currently needed...    Putting on and taking off regular lower body clothing?  3  -BB  --  3  -RB    Bathing (including washing, rinsing, and drying)  2  -BB  --  2  -RB    Toileting (which includes using toilet bed pan or urinal)  3  -BB  --  3  -RB    Putting on and taking off regular upper body clothing  3  -BB  --  3  -RB    Taking care of personal grooming (such as brushing teeth)  4  -BB  --  4  -RB    Eating meals  4  -BB  --  4  -RB    AM-PAC 6 Clicks Score (OT)  19  -BB  --  19  -RB       Functional Assessment    Outcome Measure Options  --  AM-PAC 6 Clicks Basic Mobility (PT)  -TA  --    Row Name 08/15/20 1600 08/15/20 1350          How much help from another person do you currently need...    Turning from your back to your side while in flat bed without using bedrails?  3  -GB  --     Moving from lying on back to sitting on the side of a flat bed without bedrails?  3  -GB  --     Moving to and from a bed to a chair (including a wheelchair)?  3  -GB  --     Standing up from a chair using your arms (e.g.,  wheelchair, bedside chair)?  3  -GB  --     Climbing 3-5 steps with a railing?  2  -GB  --     To walk in hospital room?  3  -GB  --     AM-PAC 6 Clicks Score (PT)  17  -GB  --        How much help from another is currently needed...    Putting on and taking off regular lower body clothing?  --  2  -RB     Bathing (including washing, rinsing, and drying)  --  2  -RB     Toileting (which includes using toilet bed pan or urinal)  --  3  -RB     Putting on and taking off regular upper body clothing  --  3  -RB     Taking care of personal grooming (such as brushing teeth)  --  4  -RB     Eating meals  --  4  -RB     AM-PAC 6 Clicks Score (OT)  --  18  -RB        Functional Assessment    Outcome Measure Options  AM-PAC 6 Clicks Basic Mobility (PT)  -GB  AM-PAC 6 Clicks Daily Activity (OT)  -RB       User Key  (r) = Recorded By, (t) = Taken By, (c) = Cosigned By    Initials Name Provider Type    RB Nathan Casey, OT Occupational Therapist    Any Mckeon, PT Physical Therapist    TA Kathi Martínez, PTA Physical Therapy Assistant    Kylee Guevara COTA/L Occupational Therapy Assistant           Time Calculation:   Time Calculation- OT     Row Name 08/18/20 1213             Time Calculation- OT    OT Start Time  0907  -BB      OT Stop Time  0931  -BB      OT Time Calculation (min)  24 min  -BB      Total Timed Code Minutes- OT  24 minute(s)  -BB      OT Received On  08/18/20  -BB        User Key  (r) = Recorded By, (t) = Taken By, (c) = Cosigned By    Initials Name Provider Type    Kylee Guevara COTA/L Occupational Therapy Assistant        Therapy Charges for Today     Code Description Service Date Service Provider Modifiers Qty    31046922703 HC OT SELF CARE/MGMT/TRAIN EA 15 MIN 8/18/2020 Kylee Prater COTA/L GO 2               GEMMA Stinson  8/18/2020

## 2020-08-19 ENCOUNTER — READMISSION MANAGEMENT (OUTPATIENT)
Dept: CALL CENTER | Facility: HOSPITAL | Age: 70
End: 2020-08-19

## 2020-08-19 NOTE — OUTREACH NOTE
Prep Survey      Responses   [facility] facility patient discharged from?  [facility]   Is LACE score < 7 ?  No   Eligibility  Readm Mgmt   Discharge diagnosis  Recurrent falls, aphasia, ADRIANNA   COVID-19 Test Status  Negative   Does the patient have one of the following disease processes/diagnoses(primary or secondary)?  Other   Does the patient have Home health ordered?  Yes   What is the Home health agency?   [facility]   Is there a DME ordered?  No   Comments regarding appointments  Per AVS   Prep survey completed?  Yes          Delphine Nicole RN

## 2020-08-20 ENCOUNTER — READMISSION MANAGEMENT (OUTPATIENT)
Dept: CALL CENTER | Facility: HOSPITAL | Age: 70
End: 2020-08-20

## 2020-08-20 NOTE — OUTREACH NOTE
Medical Week 1 Survey      Responses   [facility]   COVID-19 Test Status  Negative   Does the patient have one of the following disease processes/diagnoses(primary or secondary)?  Other   Is there a successful TCM telephone encounter documented?  No   Week 1 attempt successful?  Yes   Call start time  1620   Call end time  1625   Discharge diagnosis  Recurrent falls, aphasia, ADRIANNA   Meds reviewed with patient/caregiver?  Yes   Is the patient having any side effects they believe may be caused by any medication additions or changes?  No   Does the patient have all medications ordered at discharge?  Yes   Is the patient taking all medications as directed (includes completed medication regime)?  Yes   Does the patient have a primary care provider?   Yes   Does the patient have an appointment with their PCP within 7 days of discharge?  Yes   Has the patient kept scheduled appointments due by today?  N/A   Has home health visited the patient within 72 hours of discharge?  Yes   Psychosocial issues?  No   Did the patient receive a copy of their discharge instructions?  Yes   Is the patient/caregiver able to teach back the hierarchy of who to call/visit for symptoms/problems? PCP, Specialist, Home health nurse, Urgent Care, ED, 911  Yes   Week 1 call completed?  Yes   Graduated  Yes   Did the patient feel the follow up calls were helpful during their recovery period?  Yes   Was the number of calls appropriate?  Yes   Graduated/Revoked comments  Doing well.  Has no needs or questions at this time.          Montse Ambrose RN

## 2020-09-10 ENCOUNTER — TRANSCRIBE ORDERS (OUTPATIENT)
Dept: LAB | Facility: HOSPITAL | Age: 70
End: 2020-09-10

## 2020-09-10 DIAGNOSIS — M54.50 LOIN PAIN: Primary | ICD-10-CM

## 2020-09-10 DIAGNOSIS — N18.4 STAGE 4 CHRONIC KIDNEY DISEASE (HCC): ICD-10-CM

## 2020-09-10 DIAGNOSIS — K27.9 PEPTIC ULCER: ICD-10-CM

## 2020-09-10 DIAGNOSIS — I10 ESSENTIAL (PRIMARY) HYPERTENSION: ICD-10-CM

## 2020-09-10 DIAGNOSIS — N17.9 ACUTE RENAL FAILURE, UNSPECIFIED ACUTE RENAL FAILURE TYPE (HCC): ICD-10-CM

## 2020-09-10 DIAGNOSIS — R32 URINARY INCONTINENCE, UNSPECIFIED TYPE: ICD-10-CM

## 2020-09-10 DIAGNOSIS — R30.0 DYSURIA: ICD-10-CM

## 2020-09-24 ENCOUNTER — LAB (OUTPATIENT)
Dept: LAB | Facility: HOSPITAL | Age: 70
End: 2020-09-24

## 2020-09-24 DIAGNOSIS — R30.0 DYSURIA: ICD-10-CM

## 2020-09-24 DIAGNOSIS — I10 ESSENTIAL (PRIMARY) HYPERTENSION: ICD-10-CM

## 2020-09-24 DIAGNOSIS — N18.4 STAGE 4 CHRONIC KIDNEY DISEASE (HCC): ICD-10-CM

## 2020-09-24 DIAGNOSIS — R32 URINARY INCONTINENCE, UNSPECIFIED TYPE: ICD-10-CM

## 2020-09-24 DIAGNOSIS — M54.50 LOIN PAIN: ICD-10-CM

## 2020-09-24 DIAGNOSIS — K27.9 PEPTIC ULCER: ICD-10-CM

## 2020-09-24 DIAGNOSIS — N17.9 ACUTE RENAL FAILURE, UNSPECIFIED ACUTE RENAL FAILURE TYPE (HCC): ICD-10-CM

## 2020-09-24 LAB
25(OH)D3 SERPL-MCNC: 27.5 NG/ML (ref 30–100)
ALBUMIN SERPL-MCNC: 4.3 G/DL (ref 3.5–5.2)
ALBUMIN/GLOB SERPL: 1.4 G/DL
ALP SERPL-CCNC: 77 U/L (ref 39–117)
ALT SERPL W P-5'-P-CCNC: 8 U/L (ref 1–33)
ANION GAP SERPL CALCULATED.3IONS-SCNC: 8.1 MMOL/L (ref 5–15)
AST SERPL-CCNC: 11 U/L (ref 1–32)
BILIRUB SERPL-MCNC: 0.7 MG/DL (ref 0–1.2)
BUN SERPL-MCNC: 17 MG/DL (ref 8–23)
BUN/CREAT SERPL: 12.8 (ref 7–25)
CALCIUM SPEC-SCNC: 9.8 MG/DL (ref 8.6–10.5)
CHLORIDE SERPL-SCNC: 103 MMOL/L (ref 98–107)
CO2 SERPL-SCNC: 25.9 MMOL/L (ref 22–29)
CREAT SERPL-MCNC: 1.33 MG/DL (ref 0.57–1)
GFR SERPL CREATININE-BSD FRML MDRD: 39 ML/MIN/1.73
GLOBULIN UR ELPH-MCNC: 3.1 GM/DL
GLUCOSE SERPL-MCNC: 104 MG/DL (ref 65–99)
MAGNESIUM SERPL-MCNC: 2.2 MG/DL (ref 1.6–2.4)
POTASSIUM SERPL-SCNC: 4.7 MMOL/L (ref 3.5–5.2)
PROT SERPL-MCNC: 7.4 G/DL (ref 6–8.5)
SODIUM SERPL-SCNC: 137 MMOL/L (ref 136–145)

## 2020-09-24 PROCEDURE — 83735 ASSAY OF MAGNESIUM: CPT

## 2020-09-24 PROCEDURE — 36415 COLL VENOUS BLD VENIPUNCTURE: CPT

## 2020-09-24 PROCEDURE — 82306 VITAMIN D 25 HYDROXY: CPT

## 2020-09-24 PROCEDURE — 80053 COMPREHEN METABOLIC PANEL: CPT

## 2021-02-15 RX ORDER — DICYCLOMINE HCL 20 MG
TABLET ORAL
Qty: 120 TABLET | Refills: 2 | OUTPATIENT
Start: 2021-02-15

## 2021-03-11 ENCOUNTER — IMMUNIZATION (OUTPATIENT)
Dept: VACCINE CLINIC | Facility: HOSPITAL | Age: 71
End: 2021-03-11

## 2021-03-11 PROCEDURE — 91300 HC SARSCOV02 VAC 30MCG/0.3ML IM: CPT | Performed by: THORACIC SURGERY (CARDIOTHORACIC VASCULAR SURGERY)

## 2021-03-11 PROCEDURE — 0001A: CPT | Performed by: THORACIC SURGERY (CARDIOTHORACIC VASCULAR SURGERY)

## 2021-03-29 RX ORDER — DICYCLOMINE HCL 20 MG
TABLET ORAL
Qty: 120 TABLET | Refills: 2 | OUTPATIENT
Start: 2021-03-29

## 2021-04-01 ENCOUNTER — IMMUNIZATION (OUTPATIENT)
Dept: VACCINE CLINIC | Facility: HOSPITAL | Age: 71
End: 2021-04-01

## 2021-04-01 PROCEDURE — 91300 HC SARSCOV02 VAC 30MCG/0.3ML IM: CPT | Performed by: THORACIC SURGERY (CARDIOTHORACIC VASCULAR SURGERY)

## 2021-04-01 PROCEDURE — 0002A: CPT | Performed by: THORACIC SURGERY (CARDIOTHORACIC VASCULAR SURGERY)

## 2021-04-14 ENCOUNTER — TRANSCRIBE ORDERS (OUTPATIENT)
Dept: LAB | Facility: HOSPITAL | Age: 71
End: 2021-04-14

## 2021-04-14 ENCOUNTER — LAB (OUTPATIENT)
Dept: LAB | Facility: HOSPITAL | Age: 71
End: 2021-04-14

## 2021-04-14 DIAGNOSIS — N18.32 STAGE 3B CHRONIC KIDNEY DISEASE (HCC): ICD-10-CM

## 2021-04-14 DIAGNOSIS — R32 URINARY INCONTINENCE, UNSPECIFIED TYPE: ICD-10-CM

## 2021-04-14 DIAGNOSIS — I10 ESSENTIAL HYPERTENSION, MALIGNANT: ICD-10-CM

## 2021-04-14 DIAGNOSIS — N17.9 ACUTE RENAL FAILURE, UNSPECIFIED ACUTE RENAL FAILURE TYPE (HCC): ICD-10-CM

## 2021-04-14 DIAGNOSIS — R31.9 LOIN PAIN-HEMATURIA SYNDROME: ICD-10-CM

## 2021-04-14 DIAGNOSIS — K27.9 PEPTIC ULCER: ICD-10-CM

## 2021-04-14 DIAGNOSIS — R31.9 LOIN PAIN-HEMATURIA SYNDROME: Primary | ICD-10-CM

## 2021-04-14 DIAGNOSIS — M54.50 LOIN PAIN-HEMATURIA SYNDROME: ICD-10-CM

## 2021-04-14 DIAGNOSIS — R30.0 DYSURIA: ICD-10-CM

## 2021-04-14 DIAGNOSIS — M54.50 LOIN PAIN-HEMATURIA SYNDROME: Primary | ICD-10-CM

## 2021-04-14 LAB
25(OH)D3 SERPL-MCNC: 20 NG/ML
ALBUMIN SERPL-MCNC: 4.2 G/DL (ref 3.5–5.2)
ALBUMIN/GLOB SERPL: 1.6 G/DL
ALP SERPL-CCNC: 86 U/L (ref 39–117)
ALT SERPL W P-5'-P-CCNC: <5 U/L (ref 1–33)
ANION GAP SERPL CALCULATED.3IONS-SCNC: 11.3 MMOL/L (ref 5–15)
AST SERPL-CCNC: 13 U/L (ref 1–32)
BASOPHILS # BLD AUTO: 0.06 10*3/MM3 (ref 0–0.2)
BASOPHILS NFR BLD AUTO: 0.6 % (ref 0–1.5)
BILIRUB SERPL-MCNC: 0.5 MG/DL (ref 0–1.2)
BUN SERPL-MCNC: 21 MG/DL (ref 8–23)
BUN/CREAT SERPL: 13.2 (ref 7–25)
CALCIUM SPEC-SCNC: 9.4 MG/DL (ref 8.6–10.5)
CHLORIDE SERPL-SCNC: 101 MMOL/L (ref 98–107)
CO2 SERPL-SCNC: 28.7 MMOL/L (ref 22–29)
CREAT SERPL-MCNC: 1.59 MG/DL (ref 0.57–1)
CREAT UR-MCNC: 214.4 MG/DL
DEPRECATED RDW RBC AUTO: 45.6 FL (ref 37–54)
EOSINOPHIL # BLD AUTO: 0.32 10*3/MM3 (ref 0–0.4)
EOSINOPHIL NFR BLD AUTO: 3 % (ref 0.3–6.2)
ERYTHROCYTE [DISTWIDTH] IN BLOOD BY AUTOMATED COUNT: 13.2 % (ref 12.3–15.4)
GFR SERPL CREATININE-BSD FRML MDRD: 32 ML/MIN/1.73
GLOBULIN UR ELPH-MCNC: 2.6 GM/DL
GLUCOSE SERPL-MCNC: 97 MG/DL (ref 65–99)
HCT VFR BLD AUTO: 40.7 % (ref 34–46.6)
HGB BLD-MCNC: 14.3 G/DL (ref 12–15.9)
IMM GRANULOCYTES # BLD AUTO: 0.02 10*3/MM3 (ref 0–0.05)
IMM GRANULOCYTES NFR BLD AUTO: 0.2 % (ref 0–0.5)
LYMPHOCYTES # BLD AUTO: 3.98 10*3/MM3 (ref 0.7–3.1)
LYMPHOCYTES NFR BLD AUTO: 37.1 % (ref 19.6–45.3)
MCH RBC QN AUTO: 33.5 PG (ref 26.6–33)
MCHC RBC AUTO-ENTMCNC: 35.1 G/DL (ref 31.5–35.7)
MCV RBC AUTO: 95.3 FL (ref 79–97)
MONOCYTES # BLD AUTO: 0.85 10*3/MM3 (ref 0.1–0.9)
MONOCYTES NFR BLD AUTO: 7.9 % (ref 5–12)
NEUTROPHILS NFR BLD AUTO: 5.49 10*3/MM3 (ref 1.7–7)
NEUTROPHILS NFR BLD AUTO: 51.2 % (ref 42.7–76)
NRBC BLD AUTO-RTO: 0 /100 WBC (ref 0–0.2)
PLATELET # BLD AUTO: 364 10*3/MM3 (ref 140–450)
PMV BLD AUTO: 9.9 FL (ref 6–12)
POTASSIUM SERPL-SCNC: 4.3 MMOL/L (ref 3.5–5.2)
PROT SERPL-MCNC: 6.8 G/DL (ref 6–8.5)
PROT UR-MCNC: 19 MG/DL
PROT/CREAT UR: 88.6 MG/G CREA (ref 0–200)
RBC # BLD AUTO: 4.27 10*6/MM3 (ref 3.77–5.28)
SODIUM SERPL-SCNC: 141 MMOL/L (ref 136–145)
WBC # BLD AUTO: 10.72 10*3/MM3 (ref 3.4–10.8)

## 2021-04-14 PROCEDURE — 80053 COMPREHEN METABOLIC PANEL: CPT

## 2021-04-14 PROCEDURE — 85025 COMPLETE CBC W/AUTO DIFF WBC: CPT

## 2021-04-14 PROCEDURE — 82570 ASSAY OF URINE CREATININE: CPT

## 2021-04-14 PROCEDURE — 36415 COLL VENOUS BLD VENIPUNCTURE: CPT

## 2021-04-14 PROCEDURE — 82306 VITAMIN D 25 HYDROXY: CPT

## 2021-04-14 PROCEDURE — 84156 ASSAY OF PROTEIN URINE: CPT

## 2021-05-07 RX ORDER — SUCRALFATE 1 G/1
TABLET ORAL
Qty: 120 TABLET | Refills: 2 | OUTPATIENT
Start: 2021-05-07

## 2021-05-27 RX ORDER — DICYCLOMINE HCL 20 MG
TABLET ORAL
Qty: 120 TABLET | Refills: 2 | OUTPATIENT
Start: 2021-05-27

## 2021-07-13 RX ORDER — SUCRALFATE 1 G/1
TABLET ORAL
Qty: 120 TABLET | Refills: 2 | OUTPATIENT
Start: 2021-07-13

## 2021-07-27 ENCOUNTER — APPOINTMENT (OUTPATIENT)
Dept: CT IMAGING | Facility: HOSPITAL | Age: 71
End: 2021-07-27

## 2021-08-23 RX ORDER — SUCRALFATE 1 G/1
TABLET ORAL
Qty: 120 TABLET | Refills: 2 | OUTPATIENT
Start: 2021-08-23

## 2021-08-24 ENCOUNTER — APPOINTMENT (OUTPATIENT)
Dept: CT IMAGING | Facility: HOSPITAL | Age: 71
End: 2021-08-24

## 2023-03-15 NOTE — PLAN OF CARE
Problem: Patient Care Overview (Adult)  Goal: Plan of Care Review  Outcome: Ongoing (interventions implemented as appropriate)    04/10/17 0501   Coping/Psychosocial Response Interventions   Plan Of Care Reviewed With patient   Patient Care Overview   Progress no change       Goal: Adult Individualization and Mutuality  Outcome: Ongoing (interventions implemented as appropriate)  Goal: Discharge Needs Assessment  Outcome: Ongoing (interventions implemented as appropriate)    Problem: Pain, Acute (Adult)  Goal: Identify Related Risk Factors and Signs and Symptoms  Outcome: Outcome(s) achieved Date Met:  04/10/17  Goal: Acceptable Pain Control/Comfort Level  Outcome: Ongoing (interventions implemented as appropriate)    Problem: Respiratory Insufficiency (Adult)  Goal: Identify Related Risk Factors and Signs and Symptoms  Outcome: Outcome(s) achieved Date Met:  04/10/17  Goal: Acid/Base Balance  Outcome: Ongoing (interventions implemented as appropriate)  Goal: Effective Ventilation  Outcome: Ongoing (interventions implemented as appropriate)       Vital signs evaluated. Nurse's notes reviewed and accepted.    CHIEF COMPLAINT:    Chief Complaint   Patient presents with   • Cough   • Fever     Symptoms started yesterday deep cough, wheezy, temp yesterday 101.7 mom gave tylenol.        HISTORY OF PRESENT ILLNESS: Mendy Gillette is a 11 month old  female, brought by mother with a 1 day  history of barky cough.  Stridor has been  present  with inspiration    Associated symptoms:       URI Symptoms: present and mild     Fever:present     Temperature maximum: 101.7     Sore Throat:{hoarse voice - suspected sore throat       Shortness of Breath: absent        Vomiting:absent     Diarrhea:absent     Ill Contacts:attends day care  Intake/output:Slightly decreased po intake; Normal urinary output  Recent Illness: none      EXAM:  Pulse 118, temperature 99.7 °F (37.6 °C), temperature source Rectal, weight 8.562 kg (18 lb 14 oz), SpO2 98 %.  Stridor is noted, with inspiration with minimal excitement, not at rest  General: somewhat 'droopy' looking, but alert and not toxic  HEENT: Eyes - conjunctiva and sclera are not inflamed         Ears - external ears and canals - normal, TM's w/ normal landmarks, light reflex and mobility         Nose - mucosal erythema         Throat - mucous membranes moist, normal tonsils w/o erythema, exudates or petechia  Neck: supple and no lymphadenopathy  Lungs: Clear to auscultation, no wheezing, crackles or retractions and occasional stridor with bigger inhalations  Heart: Regular rate and rhythm w/o murmurs  Abdomen: Soft, nontender, bowel sounds - normal.  No masses or hepatosplenomegaly  Skin: pink, warm, no rashes, no ecchymosis    IMPRESSION    Croup (Laryngotracheobronchitis)    PLAN:  Discussion regarding viral etiology and course of illness, croup instruction sheet given.  Symptomatic measures including use of humidifier, steamed bathroom discussed.  Discussed warning signs of respiratory distress and when to seek further  care.  Encourage hydration, monitor for dehydration.  Tylenol for fever.  Indications to call or return including worsening cough, difficulty breathing, poor PO intake, persistent fever etc were discussed.    Decadron 6 mg x1 dose prescribed.

## 2023-05-17 ENCOUNTER — OFFICE VISIT (OUTPATIENT)
Dept: GASTROENTEROLOGY | Facility: CLINIC | Age: 73
End: 2023-05-17
Payer: MEDICARE

## 2023-05-17 VITALS
SYSTOLIC BLOOD PRESSURE: 118 MMHG | DIASTOLIC BLOOD PRESSURE: 68 MMHG | BODY MASS INDEX: 41 KG/M2 | WEIGHT: 231.4 LBS | HEART RATE: 64 BPM | HEIGHT: 63 IN

## 2023-05-17 DIAGNOSIS — K21.00 GASTROESOPHAGEAL REFLUX DISEASE WITH ESOPHAGITIS WITHOUT HEMORRHAGE: ICD-10-CM

## 2023-05-17 DIAGNOSIS — Z86.010 PERSONAL HISTORY OF COLONIC POLYPS: ICD-10-CM

## 2023-05-17 DIAGNOSIS — R11.0 NAUSEA: Primary | ICD-10-CM

## 2023-05-17 PROBLEM — I10 BENIGN ESSENTIAL HYPERTENSION: Status: ACTIVE | Noted: 2020-11-19

## 2023-05-17 PROBLEM — N18.32 STAGE 3B CHRONIC KIDNEY DISEASE: Status: ACTIVE | Noted: 2021-04-20

## 2023-05-17 PROCEDURE — 1160F RVW MEDS BY RX/DR IN RCRD: CPT | Performed by: NURSE PRACTITIONER

## 2023-05-17 PROCEDURE — 1159F MED LIST DOCD IN RCRD: CPT | Performed by: NURSE PRACTITIONER

## 2023-05-17 PROCEDURE — 3078F DIAST BP <80 MM HG: CPT | Performed by: NURSE PRACTITIONER

## 2023-05-17 PROCEDURE — 3074F SYST BP LT 130 MM HG: CPT | Performed by: NURSE PRACTITIONER

## 2023-05-17 PROCEDURE — 99213 OFFICE O/P EST LOW 20 MIN: CPT | Performed by: NURSE PRACTITIONER

## 2023-05-17 RX ORDER — FUROSEMIDE 20 MG/1
20 TABLET ORAL DAILY
Qty: 30 TABLET | Refills: 6 | COMMUNITY
Start: 2023-01-10 | End: 2023-07-10

## 2023-05-17 RX ORDER — LUBIPROSTONE 24 UG/1
1 CAPSULE ORAL 2 TIMES DAILY WITH MEALS
Qty: 60 CAPSULE | Refills: 3 | COMMUNITY
Start: 2023-04-07 | End: 2023-07-07

## 2023-05-17 RX ORDER — ERGOCALCIFEROL 1.25 MG/1
50000 CAPSULE ORAL
COMMUNITY
Start: 2023-01-10 | End: 2023-07-10

## 2023-05-17 RX ORDER — DICYCLOMINE HCL 20 MG
TABLET ORAL
COMMUNITY
Start: 2023-05-08

## 2023-05-17 RX ORDER — SODIUM CHLORIDE 9 MG/ML
40 INJECTION, SOLUTION INTRAVENOUS AS NEEDED
OUTPATIENT
Start: 2023-05-17

## 2023-05-17 RX ORDER — DEXTROSE AND SODIUM CHLORIDE 5; .45 G/100ML; G/100ML
30 INJECTION, SOLUTION INTRAVENOUS CONTINUOUS PRN
OUTPATIENT
Start: 2023-05-17

## 2023-05-17 RX ORDER — SODIUM, POTASSIUM,MAG SULFATES 17.5-3.13G
1 SOLUTION, RECONSTITUTED, ORAL ORAL EVERY 12 HOURS
Qty: 354 ML | Refills: 0 | Status: SHIPPED | OUTPATIENT
Start: 2023-05-17

## 2023-05-17 RX ORDER — ALLOPURINOL 100 MG/1
TABLET ORAL
COMMUNITY
Start: 2023-05-08

## 2023-05-17 NOTE — PROGRESS NOTES
Chief Complaint   Patient presents with   • Abdominal Pain   • Nausea       Subjective    Lili Arguello is a 72 y.o. female. she is here today for follow-up.                                                                  Assessment & Plan                                     1. Nausea    2. Gastroesophageal reflux disease with esophagitis without hemorrhage    3. Personal history of colonic polyps      Plan; schedule patient for EGD with concern for recurrent peptic ulcer continue with PPI daily and Carafate before meals.  Screening colonoscopy due to history of adenomatous colonic polyp sigmoid .    Follow-up: Return in about 4 weeks (around 2023) for Recheck, After test.     HPI  72-year-old female presents to discuss worsening abdominal pain and nausea started about 2 months ago.  She has concurrent medical history of depression, GERD, gout, hypertension, obesity and osteoarthritis.  Was having issues with constipation started on Amitiza and constipation has greatly improved now bowel movements are once per day.  States nausea is occurring prior to starting Amitiza does not like the medication is worsening her nausea.  She was last seen in this office in  EGD at that time noted gastritis.  Increased gastric fluid was noted gastric emptying study completed due to nausea at that time was normal.  Prior colonoscopy in  noted adenomatous polyp of sigmoid colon she has not been able to follow-up due to caring for her  with esophageal cancer and grief after his loss.  He  2 years ago.    Review of Systems  Review of Systems   Constitutional: Positive for fatigue. Negative for activity change, appetite change, chills, diaphoresis, fever and unexpected weight change.   HENT: Negative for sore throat and trouble swallowing.    Respiratory: Negative for shortness of  "breath.    Gastrointestinal: Positive for abdominal pain, constipation (on amitiza going daily ), nausea (frequently throughout the day ) and vomiting (intermittent ). Negative for abdominal distention, anal bleeding, blood in stool, diarrhea and rectal pain.   Musculoskeletal: Negative for arthralgias.   Skin: Negative for pallor.   Neurological: Negative for light-headedness.       /68 (BP Location: Left arm)   Pulse 64   Ht 160 cm (63\")   Wt 105 kg (231 lb 6.4 oz)   BMI 40.99 kg/m²     Objective      Physical Exam  Constitutional:       General: She is not in acute distress.     Appearance: Normal appearance. She is obese. She is not ill-appearing or toxic-appearing.   HENT:      Head: Normocephalic and atraumatic.   Pulmonary:      Effort: Pulmonary effort is normal.   Abdominal:      General: Abdomen is flat. Bowel sounds are normal. There is no distension.      Palpations: Abdomen is soft. There is no mass.      Tenderness: There is abdominal tenderness in the right upper quadrant, epigastric area and left upper quadrant.   Neurological:      Mental Status: She is alert.               The following portions of the patient's history were reviewed and updated as appropriate:   Past Medical History:   Diagnosis Date   • Chronic gastric ulcer without hemorrhage and without perforation    • Depressive disorder    • GERD (gastroesophageal reflux disease)    • Gout    • Hypertension    • Insomnia    • Morbid obesity    • Osteoarthritis of multiple joints      Past Surgical History:   Procedure Laterality Date   • BREAST SURGERY     • CHOLECYSTECTOMY     • ENDOSCOPY     • ENDOSCOPY N/A 7/6/2020    Procedure: ESOPHAGOGASTRODUODENOSCOPY possible dilation Fri;  Surgeon: Dameon Benjamin MD;  Location: [facility] ENDOSCOPY;  Service: Gastroenterology;  Laterality: N/A;   • GASTRECTOMY     • JOINT REPLACEMENT     • KNEE SURGERY Bilateral 10/02/2014     Family History   Problem Relation Age of Onset   • Lung cancer " Other      OB History    No obstetric history on file.       Allergies   Allergen Reactions   • Statins Nausea And Vomiting   • Gabapentin Other (See Comments)     Mental effects/talked out of head   • Iodine       iodine:  UNRESPONSIVENESS   • Other      ARTIFICIAL SWEETNER:  Rash: TOPICAL ANTIBACERTIAL AGENTS:  Rash   • Benzoyl Peroxide Rash   • Latex Rash   • Sulfa Antibiotics GI Intolerance     syncope   • Tape Rash   • Tramadol Other (See Comments)     Social History     Socioeconomic History   • Marital status:    Tobacco Use   • Smoking status: Former     Types: Cigarettes     Quit date: 4/9/2013     Years since quitting: 10.1   • Smokeless tobacco: Never   • Tobacco comments:     30 pack year history   Substance and Sexual Activity   • Alcohol use: No   • Drug use: Yes     Types: Oxycodone     Comment: prescribed norco q6 prn    • Sexual activity: Defer     Current Medications:  Prior to Admission medications    Medication Sig Start Date End Date Taking? Authorizing Provider   allopurinol (ZYLOPRIM) 100 MG tablet  5/8/23  Yes ProviderLoki MD   aspirin 81 MG EC tablet Take 1 tablet by mouth Daily. 4/10/17  Yes Umer Dumont MD   carbidopa-levodopa (SINEMET)  MG per tablet Take 1 tablet by mouth 3 (Three) Times a Day.   Yes Emergency, Nurse Epic, RN   dicyclomine (BENTYL) 20 MG tablet  5/8/23  Yes ProviderLoki MD   ergocalciferol (ERGOCALCIFEROL) 1.25 MG (04195 UT) capsule Take 1 capsule by mouth. 1/10/23 7/10/23 Yes ProviderLoki MD   furosemide (LASIX) 20 MG tablet Take 1 tablet by mouth Daily. 1/10/23 7/10/23 Yes ProviderLoki MD   HYDROcodone-acetaminophen (NORCO) 7.5-325 MG per tablet Take 1 tablet by mouth Every 6 (Six) Hours As Needed for Moderate Pain.   Yes ProviderLoki MD   isosorbide mononitrate (IMDUR) 30 MG 24 hr tablet Take 1 tablet by mouth Daily.   Yes Emergency, Nurse PATI Arreguin   lubiprostone (AMITIZA) 24 MCG capsule Take 1 capsule by  mouth 2 (Two) Times a Day With Meals. 4/7/23 7/7/23 Yes Loki Chisholm MD   metoprolol tartrate (LOPRESSOR) 25 MG tablet Take 1 tablet by mouth 2 (Two) Times a Day.   Yes Emergency, Nurse Kallie RN   pantoprazole (PROTONIX) 40 MG EC tablet Take 1 tablet by mouth. 1/16/20  Yes Loki Chisholm MD   spironolactone (ALDACTONE) 25 MG tablet Take 1 tablet by mouth Daily.   Yes Emergency, Nurse Kallie, RN   sucralfate (Carafate) 1 g tablet Take 1 tablet by mouth 4 (Four) Times a Day. 7/20/20  Yes Kim Ramírez APRN   albuterol (PROVENTIL HFA;VENTOLIN HFA) 108 (90 BASE) MCG/ACT inhaler Inhale 2 puffs Every 4 (Four) Hours As Needed for Wheezing. 4/10/17 5/17/23  Umer Dumont MD   ondansetron ODT (ZOFRAN-ODT) 8 MG disintegrating tablet Take 1 tablet by mouth Every 8 (Eight) Hours As Needed for Nausea or Vomiting. 4/7/18 5/17/23  Mariya Phelps APRN   zolpidem (AMBIEN) 10 MG tablet Take 1 tablet by mouth At Night As Needed. 3/20/17 5/17/23  ProviderLoki MD     Orders placed during this encounter include:  Orders Placed This Encounter   Procedures   • Obtain Informed Consent     Standing Status:   Future     Order Specific Question:   Informed Consent Given For     Answer:   ESOPHAGOGASTRODUODENOSCOPY and Colonoscopy     ESOPHAGOGASTRODUODENOSCOPY (N/A), COLONOSCOPY (N/A)  New Medications Ordered This Visit   Medications   • sodium-potassium-magnesium sulfates (Suprep Bowel Prep Kit) 17.5-3.13-1.6 GM/177ML solution oral solution     Sig: Take 1 bottle by mouth Every 12 (Twelve) Hours.     Dispense:  354 mL     Refill:  0         Review and/or summary of lab tests, radiology, procedures, medications. Review and summary of old records and obtaining of history. The risks and benefits of my recommendations, as well as other treatment options were discussed . Any questions/concerned were answered. Patient voiced understanding and agreement.          This document has been electronically signed by Kim  TOBI Brown on May 17, 2023 15:25 CDT                                               Results for orders placed or performed in visit on 04/14/21   CBC Auto Differential    Specimen: Blood   Result Value Ref Range    WBC 10.72 3.40 - 10.80 10*3/mm3    RBC 4.27 3.77 - 5.28 10*6/mm3    Hemoglobin 14.3 12.0 - 15.9 g/dL    Hematocrit 40.7 34.0 - 46.6 %    MCV 95.3 79.0 - 97.0 fL    MCH 33.5 (H) 26.6 - 33.0 pg    MCHC 35.1 31.5 - 35.7 g/dL    RDW 13.2 12.3 - 15.4 %    RDW-SD 45.6 37.0 - 54.0 fl    MPV 9.9 6.0 - 12.0 fL    Platelets 364 140 - 450 10*3/mm3    Neutrophil % 51.2 42.7 - 76.0 %    Lymphocyte % 37.1 19.6 - 45.3 %    Monocyte % 7.9 5.0 - 12.0 %    Eosinophil % 3.0 0.3 - 6.2 %    Basophil % 0.6 0.0 - 1.5 %    Immature Grans % 0.2 0.0 - 0.5 %    Neutrophils, Absolute 5.49 1.70 - 7.00 10*3/mm3    Lymphocytes, Absolute 3.98 (H) 0.70 - 3.10 10*3/mm3    Monocytes, Absolute 0.85 0.10 - 0.90 10*3/mm3    Eosinophils, Absolute 0.32 0.00 - 0.40 10*3/mm3    Basophils, Absolute 0.06 0.00 - 0.20 10*3/mm3    Immature Grans, Absolute 0.02 0.00 - 0.05 10*3/mm3    nRBC 0.0 0.0 - 0.2 /100 WBC   Protein / Creatinine Ratio, Urine - Urine, Clean Catch    Specimen: Urine, Clean Catch   Result Value Ref Range    Protein/Creatinine Ratio, Urine 88.6 0.0 - 200.0 mg/G Crea    Creatinine, Urine 214.4 mg/dL    Total Protein, Urine 19.0 mg/dL   Vitamin D 25 Hydroxy    Specimen: Blood   Result Value Ref Range    25 Hydroxy, Vitamin D 20.0 ng/ml   Comprehensive Metabolic Panel    Specimen: Blood   Result Value Ref Range    Glucose 97 65 - 99 mg/dL    BUN 21 8 - 23 mg/dL    Creatinine 1.59 (H) 0.57 - 1.00 mg/dL    Sodium 141 136 - 145 mmol/L    Potassium 4.3 3.5 - 5.2 mmol/L    Chloride 101 98 - 107 mmol/L    CO2 28.7 22.0 - 29.0 mmol/L    Calcium 9.4 8.6 - 10.5 mg/dL    Total Protein 6.8 6.0 - 8.5 g/dL    Albumin 4.20 3.50 - 5.20 g/dL    ALT (SGPT) <5 1 - 33 U/L    AST (SGOT) 13 1 - 32 U/L    Alkaline Phosphatase 86 39 - 117 U/L    Total  Bilirubin 0.5 0.0 - 1.2 mg/dL    eGFR Non African Amer 32 (L) >60 mL/min/1.73    Globulin 2.6 gm/dL    A/G Ratio 1.6 g/dL    BUN/Creatinine Ratio 13.2 7.0 - 25.0    Anion Gap 11.3 5.0 - 15.0 mmol/L   Results for orders placed or performed in visit on 09/24/20   Vitamin D 25 Hydroxy    Specimen: Blood   Result Value Ref Range    25 Hydroxy, Vitamin D 27.5 (L) 30.0 - 100.0 ng/ml   Magnesium    Specimen: Blood   Result Value Ref Range    Magnesium 2.2 1.6 - 2.4 mg/dL   Comprehensive Metabolic Panel    Specimen: Blood   Result Value Ref Range    Glucose 104 (H) 65 - 99 mg/dL    BUN 17 8 - 23 mg/dL    Creatinine 1.33 (H) 0.57 - 1.00 mg/dL    Sodium 137 136 - 145 mmol/L    Potassium 4.7 3.5 - 5.2 mmol/L    Chloride 103 98 - 107 mmol/L    CO2 25.9 22.0 - 29.0 mmol/L    Calcium 9.8 8.6 - 10.5 mg/dL    Total Protein 7.4 6.0 - 8.5 g/dL    Albumin 4.30 3.50 - 5.20 g/dL    ALT (SGPT) 8 1 - 33 U/L    AST (SGOT) 11 1 - 32 U/L    Alkaline Phosphatase 77 39 - 117 U/L    Total Bilirubin 0.7 0.0 - 1.2 mg/dL    eGFR Non African Amer 39 (L) >60 mL/min/1.73    Globulin 3.1 gm/dL    A/G Ratio 1.4 g/dL    BUN/Creatinine Ratio 12.8 7.0 - 25.0    Anion Gap 8.1 5.0 - 15.0 mmol/L   Results for orders placed or performed during the hospital encounter of 08/14/20   Gold Top - SST   Result Value Ref Range    Extra Tube Hold for add-ons.    Green Top (Gel)   Result Value Ref Range    Extra Tube Hold for add-ons.    COVID-19, [facility] IN-HOUSE, NP SWAB IN TRANSPORT MEDIA 8-10 HR TAT - Swab, Nasopharynx    Specimen: Nasopharynx; Swab   Result Value Ref Range    COVID19 Not Detected Not Detected - Ref. Range   Urinalysis, Microscopic Only - Urine, Catheter    Specimen: Urine, Catheter   Result Value Ref Range    RBC, UA None Seen None Seen /HPF    WBC, UA 3-5 None Seen, 0-2, 3-5 /HPF    Bacteria, UA 1+ (A) None Seen /HPF    Squamous Epithelial Cells, UA 3-5 (A) None Seen, 0-2 /HPF    Hyaline Casts, UA 0-2 None Seen /LPF    Methodology Manual Light  Microscopy    Urinalysis, Microscopic Only - Urine, Catheter In/Out    Specimen: Urine, Catheter In/Out   Result Value Ref Range    RBC, UA 0-2 (A) None Seen /HPF    WBC, UA 0-2 None Seen, 0-2, 3-5 /HPF    Bacteria, UA None Seen None Seen /HPF    Squamous Epithelial Cells, UA None Seen None Seen, 0-2 /HPF    Hyaline Casts, UA 3-6 None Seen /LPF    Methodology Automated Microscopy    Urinalysis With Culture If Indicated - Urine, Catheter    Specimen: Urine, Catheter   Result Value Ref Range    Color, UA Dark Yellow Yellow, Straw, Dark Yellow, Alyce    Appearance, UA Cloudy (A) Clear    pH, UA 6.0 5.0 - 9.0    Specific Gap, UA 1.015 1.003 - 1.030    Glucose, UA Negative Negative    Ketones, UA Negative Negative    Bilirubin, UA Negative Negative    Blood, UA Negative Negative    Protein, UA Negative Negative    Leuk Esterase, UA Trace (A) Negative    Nitrite, UA Negative Negative    Urobilinogen, UA 1.0 E.U./dL 0.2 - 1.0 E.U./dL   Urinalysis With Microscopic If Indicated (No Culture) - Urine, Catheter In/Out    Specimen: Urine, Catheter In/Out   Result Value Ref Range    Color, UA Orange (A) Yellow, Straw, Dark Yellow, Alyce    Appearance, UA Clear Clear    pH, UA <=5.0 5.0 - 9.0    Specific Gravity, UA 1.019 1.003 - 1.030    Glucose, UA Negative Negative    Ketones, UA Trace (A) Negative    Bilirubin, UA Small (1+) (A) Negative    Blood, UA Negative Negative    Protein, UA Negative Negative    Leuk Esterase, UA Small (1+) (A) Negative    Nitrite, UA Positive (A) Negative    Urobilinogen, UA 1.0 E.U./dL 0.2 - 1.0 E.U./dL     *Note: Due to a large number of results and/or encounters for the requested time period, some results have not been displayed. A complete set of results can be found in Results Review.

## 2023-06-24 ENCOUNTER — HOME HEALTH ADMISSION (OUTPATIENT)
Dept: HOME HEALTH SERVICES | Facility: HOME HEALTHCARE | Age: 73
End: 2023-06-24
Payer: MEDICARE

## 2023-06-24 PROBLEM — S42.402A CLOSED FRACTURE OF LEFT ELBOW, INITIAL ENCOUNTER: Status: ACTIVE | Noted: 2023-06-24

## 2023-07-24 DIAGNOSIS — S42.402D CLOSED FRACTURE OF LEFT ELBOW WITH ROUTINE HEALING, SUBSEQUENT ENCOUNTER: Primary | ICD-10-CM

## 2023-07-26 ENCOUNTER — OFFICE VISIT (OUTPATIENT)
Dept: ORTHOPEDIC SURGERY | Facility: CLINIC | Age: 73
End: 2023-07-26
Payer: MEDICARE

## 2023-07-26 VITALS — WEIGHT: 225 LBS | BODY MASS INDEX: 38.41 KG/M2 | HEIGHT: 64 IN

## 2023-07-26 DIAGNOSIS — S42.402D CLOSED FRACTURE OF LEFT ELBOW WITH ROUTINE HEALING, SUBSEQUENT ENCOUNTER: Primary | ICD-10-CM

## 2023-07-26 PROCEDURE — 1160F RVW MEDS BY RX/DR IN RCRD: CPT | Performed by: NURSE PRACTITIONER

## 2023-07-26 PROCEDURE — 1159F MED LIST DOCD IN RCRD: CPT | Performed by: NURSE PRACTITIONER

## 2023-07-26 PROCEDURE — 99024 POSTOP FOLLOW-UP VISIT: CPT | Performed by: NURSE PRACTITIONER

## 2023-07-26 NOTE — PROGRESS NOTES
The patient is a 72 y.o. female who presents for followup.    Chief Complaint   Patient presents with    Left Elbow - Fracture       HPI:    Mrs. Arguello is a 72-year-old female who presents today for follow-up evaluation after left elbow dislocation and fracture which occurred on 6/23/2023.  She is now 4 and half weeks post injury.  She has been using a sugar-tong splint with a posterior component.  No burning, tingling, numbness.  No unusual complaints.        Current Outpatient Medications:     allopurinol (ZYLOPRIM) 100 MG tablet, Take 1 tablet by mouth Daily., Disp: , Rfl:     aspirin 325 MG EC tablet, Take 1 tablet by mouth Daily., Disp: 21 tablet, Rfl: 0    carbidopa-levodopa (SINEMET)  MG per tablet, Take 1 tablet by mouth 3 (Three) Times a Day., Disp: , Rfl:     dicyclomine (BENTYL) 20 MG tablet, Take 1 tablet by mouth Every 6 (Six) Hours., Disp: , Rfl:     HYDROcodone-acetaminophen (NORCO) 7.5-325 MG per tablet, Take 1 tablet by mouth Every 6 (Six) Hours As Needed for Moderate Pain. Indications: Pain, Disp: , Rfl:     isosorbide mononitrate (IMDUR) 30 MG 24 hr tablet, Take 1 tablet by mouth Daily., Disp: , Rfl:     metoprolol tartrate (LOPRESSOR) 25 MG tablet, Take 1 tablet by mouth 2 (Two) Times a Day., Disp: , Rfl:     pantoprazole (PROTONIX) 40 MG EC tablet, Take 1 tablet by mouth., Disp: , Rfl:     sodium-potassium-magnesium sulfates (Suprep Bowel Prep Kit) 17.5-3.13-1.6 GM/177ML solution oral solution, Take 1 bottle by mouth Every 12 (Twelve) Hours., Disp: 354 mL, Rfl: 0    spironolactone (ALDACTONE) 25 MG tablet, Take 1 tablet by mouth Daily., Disp: , Rfl:     sucralfate (Carafate) 1 g tablet, Take 1 tablet by mouth 4 (Four) Times a Day., Disp: 120 tablet, Rfl: 2    Allergies   Allergen Reactions    Statins Nausea And Vomiting    Gabapentin Other (See Comments)     Mental effects/talked out of head    Iodine       iodine:  UNRESPONSIVENESS    Other      ARTIFICIAL SWEETNER:  Rash: TOPICAL  "ANTIBACERTIAL AGENTS:  Rash    Benzoyl Peroxide Rash    Latex Rash    Neosporin [Bacitracin-Polymyxin B] Rash    Sulfa Antibiotics GI Intolerance     syncope    Tape Rash    Tramadol Other (See Comments)        ROS:  No fevers or chills.  No nausea or vomiting    PHYSICAL EXAM:    Vitals:    07/26/23 0820   Weight: 102 kg (225 lb)   Height: 162.6 cm (64\")   PainSc:   8       GAIT:     []  Normal  []  Antalgic    Assistive device:   []  None    []  Walker     []  Crutches    []  Cane     []  Wheelchair    []  Stretcher    Patient is awake and alert, answers questions appropriately, and is in no apparent distress.    Left elbow:    Good passive range of motion.  Mild bony tenderness.  Fingers are warm, pink, with good capillary refill and normal sensation.  Fingers move freely in all directions.    XR Elbow 3+ View Left    Result Date: 7/26/2023  Narrative: INDICATION: pain. COMPARISON: 7/12/2023. FINDINGS: Image quality degradation secondary casting material.  Redemonstrated fracture of the distal lateral humeral condyle with displaced fracture fragments.  On interval healing has taken place.  Alignment is unchanged compared to prior examination.     XR Elbow 3+ View Left    Result Date: 7/12/2023  Narrative: FINDINGS: Images of the left elbow show the patient's chronic distal humerus fracture. This does not appear significantly changed compared with 6/28/2023.  Again seen is some subluxation of the radial head relative to the distal humerus due to the lateral humeral condyle fracture.  This is likewise unchanged. No acute abnormality.    XR Elbow 3+ View Left    Result Date: 6/28/2023  Narrative: VIEWS: Three views of the left elbow. COMPARISON: Left elbow 06/23/2023. HISTORY: Follow-up fracture.     Impression: Findings/impression: Overlying cast artifact. There is redemonstration of a fracture of the lateral aspect of the distal humerus, which involves the lateral humeral condyle/epicondyle. There are displaced " fracture fragments demonstrating anterior displacement. These are probably similar compared to the prior exam given differences in technique. There may also be an associated radial head fracture although this is not definite. Probable joint effusion.     ASSESSMENT:  Diagnoses and all orders for this visit:    Closed fracture of left elbow with routine healing, subsequent encounter  -     Ambulatory Referral to Home Health (Outpatient)        PLAN:    X-rays reviewed, continued bony healing noted.  Patient is unable to drive and relies on others for transportation, therefore we will start home health physical therapy.  Also discussed with patient's family member to be present for initial PT appointment so that they can be taught to performed passive range of motion so that patient can do range of motion at least 1-2 times per day.  Sugar-tong splint with posterior component discontinued, patient is to continue using sling at this time.  Instructed to continue range of motion of fingers and wrist to prevent contracture.  Signs and symptoms to report including when to seek care explained.  Patient is to return in 3 weeks for recheck with repeat x-rays or sooner if needed.      The orders are as follows:    PROM within  degrees as tolerated for first 2 weeks then may advance.  Avoid full extension.  Once 8 weeks post injury patient may start AROM and be ROM as tolerated.   May start light strength and resistance activities to slowly advance as tolerated 8-10 weeks post injury.        EMR Dragon/Transciption Disclaimer: Some of this note may be an electronic transcription/translation of spoken language to printed text.  The electronic translation of spoken language may permit erroneous, or at times, nonsensical words or phrases to be inadvertently transcribed. Although I have reviewed the note for such errors, some may still exist.           This document has been electronically signed by Emily BRITTON on July  26, 2023 09:32 CDT

## 2023-07-27 ENCOUNTER — TELEPHONE (OUTPATIENT)
Dept: CARDIOLOGY | Facility: CLINIC | Age: 73
End: 2023-07-27
Payer: MEDICARE

## 2023-07-27 ENCOUNTER — HOME CARE VISIT (OUTPATIENT)
Dept: HOME HEALTH SERVICES | Facility: CLINIC | Age: 73
End: 2023-07-27
Payer: MEDICARE

## 2023-07-27 NOTE — TELEPHONE ENCOUNTER
----- Message from Ash Kahn MD sent at 7/26/2023  6:02 PM CDT -----  No evidence of A-fib.  Short lasting episodes of SVT for which she is on metoprolol.    ----- Message -----  From: Ash Kahn MD  Sent: 7/26/2023   6:01 PM CDT  To: Ash Kahn MD    Patient contacted with monitor results per dr. Kahn.

## 2023-07-31 ENCOUNTER — HOME CARE VISIT (OUTPATIENT)
Dept: HOME HEALTH SERVICES | Facility: CLINIC | Age: 73
End: 2023-07-31
Payer: MEDICARE

## 2023-07-31 ENCOUNTER — HOME CARE VISIT (OUTPATIENT)
Dept: HOME HEALTH SERVICES | Facility: HOME HEALTHCARE | Age: 73
End: 2023-07-31
Payer: MEDICARE

## 2023-07-31 VITALS
SYSTOLIC BLOOD PRESSURE: 146 MMHG | RESPIRATION RATE: 18 BRPM | TEMPERATURE: 97.6 F | OXYGEN SATURATION: 97 % | DIASTOLIC BLOOD PRESSURE: 80 MMHG | HEART RATE: 74 BPM

## 2023-07-31 PROCEDURE — G0152 HHCP-SERV OF OT,EA 15 MIN: HCPCS

## 2023-08-09 ENCOUNTER — HOME CARE VISIT (OUTPATIENT)
Dept: HOME HEALTH SERVICES | Facility: CLINIC | Age: 73
End: 2023-08-09
Payer: MEDICARE

## 2023-08-09 PROCEDURE — G0158 HHC OT ASSISTANT EA 15: HCPCS

## 2023-08-10 VITALS
DIASTOLIC BLOOD PRESSURE: 62 MMHG | RESPIRATION RATE: 18 BRPM | SYSTOLIC BLOOD PRESSURE: 118 MMHG | OXYGEN SATURATION: 96 % | TEMPERATURE: 98.6 F | HEART RATE: 76 BPM

## 2023-08-15 DIAGNOSIS — S42.402D CLOSED FRACTURE OF LEFT ELBOW WITH ROUTINE HEALING, SUBSEQUENT ENCOUNTER: Primary | ICD-10-CM

## 2023-08-16 ENCOUNTER — OFFICE VISIT (OUTPATIENT)
Dept: ORTHOPEDIC SURGERY | Facility: CLINIC | Age: 73
End: 2023-08-16
Payer: MEDICARE

## 2023-08-16 VITALS — BODY MASS INDEX: 38.76 KG/M2 | HEIGHT: 64 IN | WEIGHT: 227 LBS

## 2023-08-16 DIAGNOSIS — S42.402D CLOSED FRACTURE OF LEFT ELBOW WITH ROUTINE HEALING, SUBSEQUENT ENCOUNTER: Primary | ICD-10-CM

## 2023-08-16 PROCEDURE — 99024 POSTOP FOLLOW-UP VISIT: CPT | Performed by: NURSE PRACTITIONER

## 2023-08-16 PROCEDURE — 1159F MED LIST DOCD IN RCRD: CPT | Performed by: NURSE PRACTITIONER

## 2023-08-16 PROCEDURE — 1160F RVW MEDS BY RX/DR IN RCRD: CPT | Performed by: NURSE PRACTITIONER

## 2023-08-16 NOTE — PROGRESS NOTES
"Lili Arguello is a 72 y.o. female returns for     Chief Complaint   Patient presents with    Left Elbow - Follow-up       HISTORY OF PRESENT ILLNESS:       Mrs. Arguello is a 72-year-old female who presents today to follow-up on elbow dislocation and lateral humeral condyle fracture which occurred on 6/23/2023.  She is now 7 and half weeks post injury.  She has transitioned out of sling.  She has been working with home health PT/OT.  She reports all things considered she is doing well.  She is pleased with her progress so far.  She has no unusual complaints.  She reports pain is decreasing with time.        CONCURRENT MEDICAL HISTORY:    The following portions of the patient's history were reviewed and updated as appropriate: allergies, current medications, past family history, past medical history, past social history, past surgical history and problem list.     ROS  No fevers or chills.  No chest pain or shortness of air.  No GI or  disturbances.    PHYSICAL EXAMINATION:       Ht 162.6 cm (64\")   Wt 103 kg (227 lb)   BMI 38.96 kg/mý     Physical Exam  Vitals and nursing note reviewed.   Constitutional:       General: She is not in acute distress.     Appearance: She is well-developed. She is not toxic-appearing.   HENT:      Head: Normocephalic.   Pulmonary:      Effort: Pulmonary effort is normal. No respiratory distress.   Skin:     General: Skin is warm and dry.   Neurological:      Mental Status: She is alert and oriented to person, place, and time.   Psychiatric:         Behavior: Behavior normal.         Thought Content: Thought content normal.         Judgment: Judgment normal.       GAIT:     [x]  Normal  []  Antalgic    Assistive device: [x]  None  []  Walker     []  Crutches  []  Cane     []  Wheelchair  []  Stretcher    Left Elbow Exam     Comments:  Smooth passive range of motion from -10 to 130.  No pain within this arc  Patient is able to perform gentle supination and pronation though " this causes her some discomfort.            XR Elbow 3+ View Left    Result Date: 7/26/2023  Narrative: INDICATION: pain. COMPARISON: 7/12/2023. FINDINGS: Image quality degradation secondary casting material.  Redemonstrated fracture of the distal lateral humeral condyle with displaced fracture fragments.  On interval healing has taken place.  Alignment is unchanged compared to prior examination.            ASSESSMENT:    Diagnoses and all orders for this visit:    Closed fracture of left elbow with routine healing, subsequent encounter          PLAN    X-rays reviewed, stable.  Patient reports doing well subjectively.  Patient has good passive range of motion today.  Patient instructed to continue working with home health.  Patient will start AAROM to progress to AROM as tolerated soon.  Patient may also start strength and resistance activities around 8 to 10 weeks post injury.    Patient is to return in 4 weeks for repeat x-rays.    EMR Dragon/Transciption Disclaimer: Some of this note may be an electronic transcription/translation of spoken language to printed text.  The electronic translation of spoken language may permit erroneous, or at times, nonsensical words or phrases to be inadvertently transcribed. Although I have reviewed the note for such errors, some may still exist.           This document has been electronically signed by Emily BRITTON on August 16, 2023 08:53 CDT

## 2023-08-17 ENCOUNTER — HOME CARE VISIT (OUTPATIENT)
Dept: HOME HEALTH SERVICES | Facility: CLINIC | Age: 73
End: 2023-08-17
Payer: MEDICARE

## 2023-08-17 VITALS
DIASTOLIC BLOOD PRESSURE: 78 MMHG | RESPIRATION RATE: 18 BRPM | TEMPERATURE: 97.7 F | HEART RATE: 77 BPM | OXYGEN SATURATION: 96 % | SYSTOLIC BLOOD PRESSURE: 128 MMHG

## 2023-08-17 PROCEDURE — G0158 HHC OT ASSISTANT EA 15: HCPCS

## 2023-08-24 ENCOUNTER — HOME CARE VISIT (OUTPATIENT)
Dept: HOME HEALTH SERVICES | Facility: CLINIC | Age: 73
End: 2023-08-24
Payer: MEDICARE

## 2023-08-24 VITALS
SYSTOLIC BLOOD PRESSURE: 122 MMHG | DIASTOLIC BLOOD PRESSURE: 68 MMHG | HEART RATE: 78 BPM | OXYGEN SATURATION: 96 % | TEMPERATURE: 98.2 F | RESPIRATION RATE: 18 BRPM

## 2023-08-24 PROCEDURE — G0152 HHCP-SERV OF OT,EA 15 MIN: HCPCS

## 2023-08-24 NOTE — HOME HEALTH
"OCCUPATIONAL THERAPY DISCHARGE:    REASON FOR REFERRAL: 71 Y/O FEMALE HOSPITALIZED AT [facility] 6/23/23-6/24/23 AFTER FALLING SUSTAINING LEFT ELBOW FRACTURE (ON 6/23/23), S/P REDUCATION/SPLINTING/SLING, SHE SAW JASVIR BRITTON IN THE OFFICE 6/28/23 AND SHE WAS PLACED IN FIBERGLASS SUGAR-TONG SPLINT AND GIVEN ORDERS TO PERFROM LEFT SHOULDER PENDULUM EXERCISS AND LEFT HAND ROM EXERCISES. PATIENT SAW JASVIR BRITTON ON 7/26/23 AND RECEIVED NEW ORDERS THAT STATE: \"PROM 80 DEGREES EXTENSION-120 DEGREES FLEXION AS TOLERATED FOR 1ST 2 WEEKS (UNTIL 8/9/23) THEN MAY ADVANCE. AVOID FULL EXTENSION. AT 8 WEEKS POST INJURY (8/18/23) MAY START AROM AND ROM AS TOLERATED. MAY START LIGHT STRENGTH/RESISTANCE ACTIVITIES TO SLOWLY ADVANCE AS TOLERATED 8-10 WEEKS POST INJURY (8/18/23).       SUMMARY OF CARE:  PATIENT MET ALL GOALS.  SHE PARTICIPATED IN SKILLED OT SERVICES FOR ADL RETRIANING FOR FUNCTIONAL TRANSFER TRAINING FOR ADLS, SAFETY EDUCATION/TRAINING, LEFT ELBOW THER. EX. FOR AROM/AAROM/PROM AND LIGHT STRENGTHENING AS ORDERED PER MD.  PATIENT HAS MADE GOOD PROGRESS WITH OT SERVICES AND HAS REGAINED FUNCTIONAL USE OF LEFT UE, ADLS, AND IADLS.     PRIOR VS CURRENT LEVEL  STATUS AT EVALUATION: UPPER EXTREMITY ASSESSMENT: AROM RIGHT UE: WFL AND STRENGTH: 5/5.   AROM LEFT SHOULDER/ELBOWWRIST: NT DUE TO RECENT DISTAL HUMERUS FRACTURE AND IMMOBILIZED IN BRACE.   /PINCH: LEFT HAND DOMINANT, RIGHT  STRENGTH: 5/5 AND LEFT  STRENGTH: UNABLE TO ASSESS DUE TO SPLINT HOWEVER AROM IN LEFT HAND: WFL IN MOTIONS SPLINT WILL ALLOW.   FINE MOTOR COORDINATION: RIGHT UE: WFL AND LEFT UE: UNABLE TO ASSESS DUE TO LEFT HAND PARTLY IMMOBILIZED IN SPLINT.   UPPER EXTREMITY GROSS MOTOR COORDINATION: RIGHT UE: WFL AND LEFT UE: NT   BATHING: MOD-MAX(A)   DRESSING: SUPERVISION   FUNCTIONAL MOBILITY CG(A)   TOILETING: MIN(A)   UPPER BODY DRESSING: MOD(A)   LOWER BODY DRESSING: MOD-MAX(A)   FEEDING: SUPERVISION WITH ASSISTANCE REQUIRED TO OPEN " CONTAINER.   GROOMING: MIN(A)   FUNCTIONAL TRANSFERS: CG-MIN(A)     CURRENT STATUS: UPPER EXTREMITY ASSESSMENT: AROM RIGHT UE: WFL AND STRENGTH: 5/5. AROM LEFT HAND/WRIST/SHOULDER: WFL   AROM LEFT ELBOW:  SUPINATION: WFL, PRONATION: WFL, FLEXION: 118 DEGREES AND EXTENSION: 30 DEGREES FROM FULL  /PINCH: LEFT HAND DOMINANT, RIGHT  STRENGTH: 5/5 AND LEFT  STRENGTH: 4/5.  FINE MOTOR COORDINATION: RIGHT UE: WFL AND LEFT UE: WFL   UPPER EXTREMITY GROSS MOTOR COORDINATION: RIGHT UE: WFL AND LEFT WFL  BATHING: MODIFIED INDEPENDENT  DRESSING: MODIFIED INDEPENDENT  FUNCTIONAL MOBILITY: INDEPENDENT   TOILETING: MODIFIED INDEPENDENT   UPPER BODY DRESSING: MODIFIED INDEPENDENT  LOWER BODY DRESSING: MODIFIED INDEPENDENT  FEEDING: MODIFIED INDEPENDENT   GROOMING: MODIFIED INDEPENDENT      DISCHARGE STATUS: TO SELF     DISCHARGE CONDITION: GOOD     DISCHARGE REASON: GOALS MET     MD VISIT: 9/13/23 JASVIR BRITTON     INSTRUCTIONS HOME PROGRAM PROVIDED TO: SHIKHATENJOAQUIN          CONTENT:  LEFT ELBOW/UE HEP          PATIENT CAREGIVER LEVEL OF COMPLIANCE: GOOD     UNMET NEEDS: NONE  SERVICES CONTINUING: NONE     COMMUNICATION / CARE COORDINATION:  CASE COMMUNICATION NOTE TO CLINICAL LEADER NOTIFYING OF DISCHARGE OASIS COMPLETE.      MEDICARE 2 DAY NOTICE GIVEN: YES     PATIENT IS AGREEABLE WITH DISCHARGE PLAN: YES

## 2023-09-12 DIAGNOSIS — S42.402D CLOSED FRACTURE OF LEFT ELBOW WITH ROUTINE HEALING, SUBSEQUENT ENCOUNTER: Primary | ICD-10-CM

## 2023-09-13 ENCOUNTER — OFFICE VISIT (OUTPATIENT)
Dept: ORTHOPEDIC SURGERY | Facility: CLINIC | Age: 73
End: 2023-09-13
Payer: MEDICARE

## 2023-09-13 VITALS — HEIGHT: 64 IN | BODY MASS INDEX: 39.18 KG/M2 | WEIGHT: 229.5 LBS

## 2023-09-13 DIAGNOSIS — S42.402D CLOSED FRACTURE OF LEFT ELBOW WITH ROUTINE HEALING, SUBSEQUENT ENCOUNTER: Primary | ICD-10-CM

## 2023-09-13 PROCEDURE — 1159F MED LIST DOCD IN RCRD: CPT | Performed by: NURSE PRACTITIONER

## 2023-09-13 PROCEDURE — 1160F RVW MEDS BY RX/DR IN RCRD: CPT | Performed by: NURSE PRACTITIONER

## 2023-09-13 PROCEDURE — 99024 POSTOP FOLLOW-UP VISIT: CPT | Performed by: NURSE PRACTITIONER

## 2023-09-13 NOTE — PROGRESS NOTES
The patient is a 73 y.o. female who presents for followup.    Chief Complaint   Patient presents with    Left Elbow - Follow-up, Pain       HPI:    Mrs. Arguello is a 73-year-old female who presents today to follow-up on elbow dislocation and lateral humeral condyle fracture which occurred on 6/23/2023.  She will be 12 weeks post injury on Friday.  She was initially treated with physical therapy and sling.  She reports she is doing well at home.  She has started to perform strength and resistance activities and is not having issues.  She continues to work with range of motion.  Pain continues to improve.  She is sent for x-rays.        Current Outpatient Medications:     allopurinol (ZYLOPRIM) 100 MG tablet, Take 1 tablet by mouth Daily., Disp: , Rfl:     aspirin 325 MG EC tablet, Take 1 tablet by mouth Daily., Disp: 21 tablet, Rfl: 0    carbidopa-levodopa (SINEMET)  MG per tablet, Take 1 tablet by mouth 3 (Three) Times a Day., Disp: , Rfl:     dicyclomine (BENTYL) 20 MG tablet, Take 1 tablet by mouth Every 6 (Six) Hours., Disp: , Rfl:     HYDROcodone-acetaminophen (NORCO) 7.5-325 MG per tablet, Take 1 tablet by mouth Every 6 (Six) Hours As Needed for Moderate Pain. Indications: Pain, Disp: , Rfl:     isosorbide mononitrate (IMDUR) 30 MG 24 hr tablet, Take 1 tablet by mouth Daily., Disp: , Rfl:     metoprolol tartrate (LOPRESSOR) 25 MG tablet, Take 1 tablet by mouth 2 (Two) Times a Day., Disp: , Rfl:     pantoprazole (PROTONIX) 40 MG EC tablet, Take 1 tablet by mouth., Disp: , Rfl:     sodium-potassium-magnesium sulfates (Suprep Bowel Prep Kit) 17.5-3.13-1.6 GM/177ML solution oral solution, Take 1 bottle by mouth Every 12 (Twelve) Hours., Disp: 354 mL, Rfl: 0    spironolactone (ALDACTONE) 25 MG tablet, Take 1 tablet by mouth Daily., Disp: , Rfl:     sucralfate (Carafate) 1 g tablet, Take 1 tablet by mouth 4 (Four) Times a Day., Disp: 120 tablet, Rfl: 2    Allergies   Allergen Reactions    Statins Nausea And  "Vomiting    Gabapentin Other (See Comments)     Mental effects/talked out of head    Iodine       iodine:  UNRESPONSIVENESS    Other      ARTIFICIAL SWEETNER:  Rash: TOPICAL ANTIBACERTIAL AGENTS:  Rash    Benzoyl Peroxide Rash    Latex Rash    Neosporin [Bacitracin-Polymyxin B] Rash    Sulfa Antibiotics GI Intolerance     syncope    Tape Rash    Tramadol Other (See Comments)        ROS:  No fevers or chills.  No nausea or vomiting    PHYSICAL EXAM:    Vitals:    09/13/23 0812   Weight: 104 kg (229 lb 8 oz)   Height: 162.6 cm (64\")       GAIT:     []  Normal  []  Antalgic    Assistive device:   []  None    []  Walker     []  Crutches    []  Cane     []  Wheelchair    []  Stretcher    Patient is awake and alert, answers questions appropriately, and is in no apparent distress.    Left elbow:    Patient has good range of motion however some limitations with extension.  No bony tenderness.  Fingers are warm, pink, good capillary refill and move freely.    XR Elbow 3+ View Left    Result Date: 8/16/2023  Narrative: HISTORY: The patient has a history of elbow pain. EXAMINATION PERFORMED: LEFT ELBOW TECHNIQUE: Frontal, lateral, and oblique images of the left elbow were obtained. COMPARISON: Comparison is made to a prior study from 7/26/2023. FINDINGS: No significant change is noted in the fracture deformity involving the lateral humeral condyle.  No significant healing is noted compared to the patient's prior study.      ASSESSMENT:  Diagnoses and all orders for this visit:    Closed fracture of left elbow with routine healing, subsequent encounter        PLAN:    X-rays reviewed, interval healing noted.  Overall patient continues to do very well considering her injury and age.  Patient does continue to have some limitations with extension, explained to patient that some decreased extension is not unexpected considering her injury.  She is doing well despite limitations of range of motion and reports performing ADLs without " issue.  No restrictions at this time, however patient is instructed not to push past pain.  Signs and symptoms report including to seek care explained.  Patient verbalized understanding is to return in 8 weeks for repeat x-rays or sooner if needed.      EMR Dragon/Transciption Disclaimer: Some of this note may be an electronic transcription/translation of spoken language to printed text.  The electronic translation of spoken language may permit erroneous, or at times, nonsensical words or phrases to be inadvertently transcribed. Although I have reviewed the note for such errors, some may still exist.           This document has been electronically signed by Emily BRITTON on September 13, 2023 08:36 CDT

## (undated) DEVICE — SINGLE-USE BIOPSY FORCEPS: Brand: RADIAL JAW 4

## (undated) DEVICE — BITEBLOCK ENDO W/STRAP 60F A/ LF DISP